# Patient Record
Sex: MALE | Race: WHITE | NOT HISPANIC OR LATINO | Employment: OTHER | URBAN - METROPOLITAN AREA
[De-identification: names, ages, dates, MRNs, and addresses within clinical notes are randomized per-mention and may not be internally consistent; named-entity substitution may affect disease eponyms.]

---

## 2017-02-21 ENCOUNTER — GENERIC CONVERSION - ENCOUNTER (OUTPATIENT)
Dept: OTHER | Facility: OTHER | Age: 57
End: 2017-02-21

## 2017-02-21 ENCOUNTER — LAB CONVERSION - ENCOUNTER (OUTPATIENT)
Dept: OTHER | Facility: OTHER | Age: 57
End: 2017-02-21

## 2017-02-21 LAB — TSH SERPL DL<=0.05 MIU/L-ACNC: 5.59 MIU/L (ref 0.4–4.5)

## 2017-04-07 ENCOUNTER — ALLSCRIPTS OFFICE VISIT (OUTPATIENT)
Dept: OTHER | Facility: OTHER | Age: 57
End: 2017-04-07

## 2017-04-18 ENCOUNTER — LAB CONVERSION - ENCOUNTER (OUTPATIENT)
Dept: OTHER | Facility: OTHER | Age: 57
End: 2017-04-18

## 2017-04-18 ENCOUNTER — GENERIC CONVERSION - ENCOUNTER (OUTPATIENT)
Dept: OTHER | Facility: OTHER | Age: 57
End: 2017-04-18

## 2017-04-18 LAB
BASOPHILS # BLD AUTO: 0.6 %
BASOPHILS # BLD AUTO: 56 CELLS/UL (ref 0–200)
DEPRECATED RDW RBC AUTO: 15.5 % (ref 11–15)
EOSINOPHIL # BLD AUTO: 470 CELLS/UL (ref 15–500)
EOSINOPHIL # BLD AUTO: 5 %
HCT VFR BLD AUTO: 44.6 % (ref 38.5–50)
HGB BLD-MCNC: 14.6 G/DL (ref 13.2–17.1)
LYMPHOCYTES # BLD AUTO: 3675 CELLS/UL (ref 850–3900)
LYMPHOCYTES # BLD AUTO: 39.1 %
MCH RBC QN AUTO: 32.5 PG (ref 27–33)
MCHC RBC AUTO-ENTMCNC: 32.8 G/DL (ref 32–36)
MCV RBC AUTO: 99.4 FL (ref 80–100)
MONOCYTES # BLD AUTO: 874 CELLS/UL (ref 200–950)
MONOCYTES (HISTORICAL): 9.3 %
NEUTROPHILS # BLD AUTO: 4324 CELLS/UL (ref 1500–7800)
NEUTROPHILS # BLD AUTO: 46 %
PLATELET # BLD AUTO: 265 THOUSAND/UL (ref 140–400)
PMV BLD AUTO: 9.2 FL (ref 7.5–12.5)
RBC # BLD AUTO: 4.48 MILLION/UL (ref 4.2–5.8)
T4 TOTAL (HISTORICAL): 6.8 MCG/DL (ref 4.5–12)
TSH SERPL DL<=0.05 MIU/L-ACNC: 6.54 MIU/L (ref 0.4–4.5)
WBC # BLD AUTO: 9.4 THOUSAND/UL (ref 3.8–10.8)

## 2017-05-09 DIAGNOSIS — Z79.899 OTHER LONG TERM (CURRENT) DRUG THERAPY: ICD-10-CM

## 2017-05-09 DIAGNOSIS — E03.9 HYPOTHYROIDISM: ICD-10-CM

## 2017-05-10 ENCOUNTER — LAB CONVERSION - ENCOUNTER (OUTPATIENT)
Dept: OTHER | Facility: OTHER | Age: 57
End: 2017-05-10

## 2017-05-10 ENCOUNTER — GENERIC CONVERSION - ENCOUNTER (OUTPATIENT)
Dept: OTHER | Facility: OTHER | Age: 57
End: 2017-05-10

## 2017-05-10 LAB
T4 TOTAL (HISTORICAL): 6.7 MCG/DL (ref 4.5–12)
TSH SERPL DL<=0.05 MIU/L-ACNC: 3.91 MIU/L (ref 0.4–4.5)

## 2017-06-05 ENCOUNTER — ALLSCRIPTS OFFICE VISIT (OUTPATIENT)
Dept: OTHER | Facility: OTHER | Age: 57
End: 2017-06-05

## 2017-06-06 ENCOUNTER — ALLSCRIPTS OFFICE VISIT (OUTPATIENT)
Dept: WOUND CARE | Facility: HOSPITAL | Age: 57
End: 2017-06-06
Payer: MEDICARE

## 2017-06-06 PROCEDURE — 99213 OFFICE O/P EST LOW 20 MIN: CPT | Performed by: PODIATRIST

## 2017-06-06 PROCEDURE — 29580 STRAPPING UNNA BOOT: CPT | Performed by: PODIATRIST

## 2017-06-13 ENCOUNTER — APPOINTMENT (OUTPATIENT)
Dept: WOUND CARE | Facility: HOSPITAL | Age: 57
End: 2017-06-13
Payer: MEDICARE

## 2017-06-13 PROCEDURE — 29580 STRAPPING UNNA BOOT: CPT | Performed by: PODIATRIST

## 2017-06-20 ENCOUNTER — APPOINTMENT (OUTPATIENT)
Dept: WOUND CARE | Facility: HOSPITAL | Age: 57
End: 2017-06-20
Payer: MEDICARE

## 2017-06-20 PROCEDURE — 99211 OFF/OP EST MAY X REQ PHY/QHP: CPT | Performed by: PODIATRIST

## 2017-08-02 ENCOUNTER — GENERIC CONVERSION - ENCOUNTER (OUTPATIENT)
Dept: OTHER | Facility: OTHER | Age: 57
End: 2017-08-02

## 2017-08-02 ENCOUNTER — APPOINTMENT (OUTPATIENT)
Dept: LAB | Facility: HOSPITAL | Age: 57
End: 2017-08-02
Payer: MEDICARE

## 2017-08-02 DIAGNOSIS — I10 ESSENTIAL (PRIMARY) HYPERTENSION: ICD-10-CM

## 2017-08-02 LAB
ALBUMIN SERPL BCP-MCNC: 3.5 G/DL (ref 3.5–5)
ALP SERPL-CCNC: 111 U/L (ref 46–116)
ALT SERPL W P-5'-P-CCNC: 104 U/L (ref 12–78)
ANION GAP SERPL CALCULATED.3IONS-SCNC: 9 MMOL/L (ref 4–13)
AST SERPL W P-5'-P-CCNC: 106 U/L (ref 5–45)
BASOPHILS # BLD AUTO: 0.05 THOUSANDS/ΜL (ref 0–0.1)
BASOPHILS NFR BLD AUTO: 1 % (ref 0–1)
BILIRUB SERPL-MCNC: 0.56 MG/DL (ref 0.2–1)
BUN SERPL-MCNC: 11 MG/DL (ref 5–25)
CALCIUM SERPL-MCNC: 8.9 MG/DL (ref 8.3–10.1)
CHLORIDE SERPL-SCNC: 101 MMOL/L (ref 100–108)
CHOLEST SERPL-MCNC: 157 MG/DL (ref 50–200)
CO2 SERPL-SCNC: 29 MMOL/L (ref 21–32)
CREAT SERPL-MCNC: 1.14 MG/DL (ref 0.6–1.3)
EOSINOPHIL # BLD AUTO: 0.33 THOUSAND/ΜL (ref 0–0.61)
EOSINOPHIL NFR BLD AUTO: 4 % (ref 0–6)
ERYTHROCYTE [DISTWIDTH] IN BLOOD BY AUTOMATED COUNT: 14.4 % (ref 11.6–15.1)
GFR SERPL CREATININE-BSD FRML MDRD: 70 ML/MIN/1.73SQ M
GLUCOSE P FAST SERPL-MCNC: 100 MG/DL (ref 65–99)
HCT VFR BLD AUTO: 42.6 % (ref 36.5–49.3)
HDLC SERPL-MCNC: 51 MG/DL (ref 40–60)
HGB BLD-MCNC: 15 G/DL (ref 12–17)
LDLC SERPL CALC-MCNC: 56 MG/DL (ref 0–100)
LYMPHOCYTES # BLD AUTO: 3.98 THOUSANDS/ΜL (ref 0.6–4.47)
LYMPHOCYTES NFR BLD AUTO: 43 % (ref 14–44)
MCH RBC QN AUTO: 33.9 PG (ref 26.8–34.3)
MCHC RBC AUTO-ENTMCNC: 35.2 G/DL (ref 31.4–37.4)
MCV RBC AUTO: 96 FL (ref 82–98)
MONOCYTES # BLD AUTO: 1.03 THOUSAND/ΜL (ref 0.17–1.22)
MONOCYTES NFR BLD AUTO: 12 % (ref 4–12)
NEUTROPHILS # BLD AUTO: 3.6 THOUSANDS/ΜL (ref 1.85–7.62)
NEUTS SEG NFR BLD AUTO: 40 % (ref 43–75)
NRBC BLD AUTO-RTO: 0 /100 WBCS
PLATELET # BLD AUTO: 256 THOUSANDS/UL (ref 149–390)
PMV BLD AUTO: 10.7 FL (ref 8.9–12.7)
POTASSIUM SERPL-SCNC: 3.8 MMOL/L (ref 3.5–5.3)
PROT SERPL-MCNC: 8 G/DL (ref 6.4–8.2)
RBC # BLD AUTO: 4.43 MILLION/UL (ref 3.88–5.62)
SODIUM SERPL-SCNC: 139 MMOL/L (ref 136–145)
TRIGL SERPL-MCNC: 251 MG/DL
TSH SERPL DL<=0.05 MIU/L-ACNC: 4.36 UIU/ML (ref 0.36–3.74)
WBC # BLD AUTO: 8.99 THOUSAND/UL (ref 4.31–10.16)

## 2017-08-02 PROCEDURE — 84443 ASSAY THYROID STIM HORMONE: CPT

## 2017-08-02 PROCEDURE — 80053 COMPREHEN METABOLIC PANEL: CPT

## 2017-08-02 PROCEDURE — 85025 COMPLETE CBC W/AUTO DIFF WBC: CPT

## 2017-08-02 PROCEDURE — 80061 LIPID PANEL: CPT

## 2017-08-02 PROCEDURE — 36415 COLL VENOUS BLD VENIPUNCTURE: CPT

## 2017-08-21 DIAGNOSIS — I10 ESSENTIAL (PRIMARY) HYPERTENSION: ICD-10-CM

## 2017-08-21 DIAGNOSIS — R74.8 ABNORMAL LEVELS OF OTHER SERUM ENZYMES: ICD-10-CM

## 2017-08-30 ENCOUNTER — ALLSCRIPTS OFFICE VISIT (OUTPATIENT)
Dept: OTHER | Facility: OTHER | Age: 57
End: 2017-08-30

## 2017-09-05 ENCOUNTER — HOSPITAL ENCOUNTER (OUTPATIENT)
Dept: ULTRASOUND IMAGING | Facility: HOSPITAL | Age: 57
Discharge: HOME | End: 2017-09-05
Attending: INTERNAL MEDICINE
Payer: MEDICARE

## 2017-09-05 ENCOUNTER — APPOINTMENT (OUTPATIENT)
Dept: LAB | Facility: HOSPITAL | Age: 57
End: 2017-09-05
Payer: MEDICARE

## 2017-09-05 ENCOUNTER — ALLSCRIPTS OFFICE VISIT (OUTPATIENT)
Dept: OTHER | Facility: OTHER | Age: 57
End: 2017-09-05

## 2017-09-05 DIAGNOSIS — R74.8 ABNORMAL LEVELS OF OTHER SERUM ENZYMES: ICD-10-CM

## 2017-09-05 DIAGNOSIS — Z79.899 OTHER LONG TERM (CURRENT) DRUG THERAPY: ICD-10-CM

## 2017-09-05 DIAGNOSIS — E03.9 HYPOTHYROIDISM: ICD-10-CM

## 2017-09-05 LAB
FERRITIN SERPL-MCNC: 173 NG/ML (ref 8–388)
INR PPP: 1.04 (ref 0.86–1.16)
IRON SATN MFR SERPL: 31 %
IRON SERPL-MCNC: 130 UG/DL (ref 65–175)
PROTHROMBIN TIME: 13.6 SECONDS (ref 12.1–14.4)
T4 SERPL-MCNC: 7.6 UG/DL (ref 4.7–13.3)
TIBC SERPL-MCNC: 417 UG/DL (ref 250–450)
TSH SERPL DL<=0.05 MIU/L-ACNC: 2.41 UIU/ML (ref 0.36–3.74)

## 2017-09-05 PROCEDURE — 83550 IRON BINDING TEST: CPT

## 2017-09-05 PROCEDURE — 86038 ANTINUCLEAR ANTIBODIES: CPT

## 2017-09-05 PROCEDURE — 87340 HEPATITIS B SURFACE AG IA: CPT

## 2017-09-05 PROCEDURE — 83540 ASSAY OF IRON: CPT

## 2017-09-05 PROCEDURE — 85610 PROTHROMBIN TIME: CPT

## 2017-09-05 PROCEDURE — 82728 ASSAY OF FERRITIN: CPT

## 2017-09-05 PROCEDURE — 86256 FLUORESCENT ANTIBODY TITER: CPT

## 2017-09-05 PROCEDURE — 76705 ECHO EXAM OF ABDOMEN: CPT

## 2017-09-05 PROCEDURE — 87522 HEPATITIS C REVRS TRNSCRPJ: CPT

## 2017-09-05 PROCEDURE — 86706 HEP B SURFACE ANTIBODY: CPT

## 2017-09-05 PROCEDURE — 36415 COLL VENOUS BLD VENIPUNCTURE: CPT

## 2017-09-05 PROCEDURE — 86235 NUCLEAR ANTIGEN ANTIBODY: CPT

## 2017-09-05 PROCEDURE — 86803 HEPATITIS C AB TEST: CPT

## 2017-09-05 PROCEDURE — 86708 HEPATITIS A ANTIBODY: CPT

## 2017-09-05 PROCEDURE — 84436 ASSAY OF TOTAL THYROXINE: CPT

## 2017-09-05 PROCEDURE — 84443 ASSAY THYROID STIM HORMONE: CPT

## 2017-09-06 LAB
ACTIN IGG SERPL-ACNC: 13 UNITS (ref 0–19)
HAV AB SER QL IA: NORMAL
HBV SURFACE AB SER-ACNC: <3.1 MIU/ML
HBV SURFACE AG SER QL: NORMAL
HCV AB SER QL: NORMAL
MITOCHONDRIA M2 IGG SER-ACNC: 4.1 UNITS (ref 0–20)
RYE IGE QN: NEGATIVE

## 2017-09-07 LAB
HCV RNA SERPL NAA+PROBE-ACNC: NORMAL IU/ML
TEST INFORMATION: NORMAL

## 2017-09-08 ENCOUNTER — GENERIC CONVERSION - ENCOUNTER (OUTPATIENT)
Dept: OTHER | Facility: OTHER | Age: 57
End: 2017-09-08

## 2017-09-25 ENCOUNTER — GENERIC CONVERSION - ENCOUNTER (OUTPATIENT)
Dept: OTHER | Facility: OTHER | Age: 57
End: 2017-09-25

## 2017-09-25 DIAGNOSIS — R74.8 ABNORMAL LEVELS OF OTHER SERUM ENZYMES: ICD-10-CM

## 2017-09-27 ENCOUNTER — APPOINTMENT (OUTPATIENT)
Dept: LAB | Facility: HOSPITAL | Age: 57
End: 2017-09-27
Attending: INTERNAL MEDICINE
Payer: MEDICARE

## 2017-09-27 DIAGNOSIS — R74.8 ABNORMAL LEVELS OF OTHER SERUM ENZYMES: ICD-10-CM

## 2017-09-27 DIAGNOSIS — Z79.899 OTHER LONG TERM (CURRENT) DRUG THERAPY: ICD-10-CM

## 2017-09-27 DIAGNOSIS — E03.9 HYPOTHYROIDISM: ICD-10-CM

## 2017-09-27 LAB
ALBUMIN SERPL BCP-MCNC: 3.6 G/DL (ref 3.5–5)
ALP SERPL-CCNC: 94 U/L (ref 46–116)
ALT SERPL W P-5'-P-CCNC: 87 U/L (ref 12–78)
ANION GAP SERPL CALCULATED.3IONS-SCNC: 8 MMOL/L (ref 4–13)
AST SERPL W P-5'-P-CCNC: 81 U/L (ref 5–45)
BILIRUB DIRECT SERPL-MCNC: 0.18 MG/DL (ref 0–0.2)
BILIRUB SERPL-MCNC: 0.39 MG/DL (ref 0.2–1)
BUN SERPL-MCNC: 14 MG/DL (ref 5–25)
CALCIUM SERPL-MCNC: 9.2 MG/DL (ref 8.3–10.1)
CHLORIDE SERPL-SCNC: 103 MMOL/L (ref 100–108)
CO2 SERPL-SCNC: 29 MMOL/L (ref 21–32)
CREAT SERPL-MCNC: 1.09 MG/DL (ref 0.6–1.3)
GFR SERPL CREATININE-BSD FRML MDRD: 74 ML/MIN/1.73SQ M
GLUCOSE P FAST SERPL-MCNC: 91 MG/DL (ref 65–99)
POTASSIUM SERPL-SCNC: 4.4 MMOL/L (ref 3.5–5.3)
PROT SERPL-MCNC: 7.8 G/DL (ref 6.4–8.2)
SODIUM SERPL-SCNC: 140 MMOL/L (ref 136–145)

## 2017-09-27 PROCEDURE — 36415 COLL VENOUS BLD VENIPUNCTURE: CPT

## 2017-09-27 PROCEDURE — 80076 HEPATIC FUNCTION PANEL: CPT

## 2017-09-27 PROCEDURE — 80048 BASIC METABOLIC PNL TOTAL CA: CPT

## 2017-11-28 ENCOUNTER — APPOINTMENT (OUTPATIENT)
Dept: LAB | Facility: HOSPITAL | Age: 57
End: 2017-11-28
Attending: INTERNAL MEDICINE
Payer: MEDICARE

## 2017-11-28 ENCOUNTER — TRANSCRIBE ORDERS (OUTPATIENT)
Dept: ADMINISTRATIVE | Facility: HOSPITAL | Age: 57
End: 2017-11-28

## 2017-11-28 ENCOUNTER — GENERIC CONVERSION - ENCOUNTER (OUTPATIENT)
Dept: OTHER | Facility: OTHER | Age: 57
End: 2017-11-28

## 2017-11-28 ENCOUNTER — ALLSCRIPTS OFFICE VISIT (OUTPATIENT)
Dept: OTHER | Facility: OTHER | Age: 57
End: 2017-11-28

## 2017-11-28 DIAGNOSIS — R74.8 ABNORMAL LEVELS OF OTHER SERUM ENZYMES: ICD-10-CM

## 2017-11-28 DIAGNOSIS — R74.8 ACID PHOSPHATASE ELEVATED: ICD-10-CM

## 2017-11-28 DIAGNOSIS — R74.8 ACID PHOSPHATASE ELEVATED: Primary | ICD-10-CM

## 2017-11-28 LAB
ALBUMIN SERPL BCP-MCNC: 3.7 G/DL (ref 3.5–5)
ALP SERPL-CCNC: 98 U/L (ref 46–116)
ALT SERPL W P-5'-P-CCNC: 67 U/L (ref 12–78)
ANION GAP SERPL CALCULATED.3IONS-SCNC: 9 MMOL/L (ref 4–13)
AST SERPL W P-5'-P-CCNC: 74 U/L (ref 5–45)
BASOPHILS # BLD AUTO: 0.04 THOUSANDS/ΜL (ref 0–0.1)
BASOPHILS NFR BLD AUTO: 0 % (ref 0–1)
BILIRUB DIRECT SERPL-MCNC: 0.17 MG/DL (ref 0–0.2)
BILIRUB SERPL-MCNC: 0.39 MG/DL (ref 0.2–1)
BUN SERPL-MCNC: 13 MG/DL (ref 5–25)
CALCIUM SERPL-MCNC: 9 MG/DL (ref 8.3–10.1)
CHLORIDE SERPL-SCNC: 102 MMOL/L (ref 100–108)
CO2 SERPL-SCNC: 27 MMOL/L (ref 21–32)
CREAT SERPL-MCNC: 1.08 MG/DL (ref 0.6–1.3)
EOSINOPHIL # BLD AUTO: 0.17 THOUSAND/ΜL (ref 0–0.61)
EOSINOPHIL NFR BLD AUTO: 2 % (ref 0–6)
ERYTHROCYTE [DISTWIDTH] IN BLOOD BY AUTOMATED COUNT: 14.5 % (ref 11.6–15.1)
GFR SERPL CREATININE-BSD FRML MDRD: 75 ML/MIN/1.73SQ M
GGT SERPL-CCNC: 419 U/L (ref 5–85)
GLUCOSE P FAST SERPL-MCNC: 103 MG/DL (ref 65–99)
HCT VFR BLD AUTO: 43.9 % (ref 36.5–49.3)
HGB BLD-MCNC: 14.9 G/DL (ref 12–17)
INR PPP: 1.07 (ref 0.86–1.16)
LYMPHOCYTES # BLD AUTO: 2.46 THOUSANDS/ΜL (ref 0.6–4.47)
LYMPHOCYTES NFR BLD AUTO: 25 % (ref 14–44)
MCH RBC QN AUTO: 33.1 PG (ref 26.8–34.3)
MCHC RBC AUTO-ENTMCNC: 33.9 G/DL (ref 31.4–37.4)
MCV RBC AUTO: 98 FL (ref 82–98)
MONOCYTES # BLD AUTO: 1.06 THOUSAND/ΜL (ref 0.17–1.22)
MONOCYTES NFR BLD AUTO: 11 % (ref 4–12)
NEUTROPHILS # BLD AUTO: 5.97 THOUSANDS/ΜL (ref 1.85–7.62)
NEUTS SEG NFR BLD AUTO: 62 % (ref 43–75)
PLATELET # BLD AUTO: 312 THOUSANDS/UL (ref 149–390)
PMV BLD AUTO: 10.1 FL (ref 8.9–12.7)
POTASSIUM SERPL-SCNC: 4.2 MMOL/L (ref 3.5–5.3)
PROT SERPL-MCNC: 8.3 G/DL (ref 6.4–8.2)
PROTHROMBIN TIME: 13.9 SECONDS (ref 12.1–14.4)
RBC # BLD AUTO: 4.5 MILLION/UL (ref 3.88–5.62)
SODIUM SERPL-SCNC: 138 MMOL/L (ref 136–145)
WBC # BLD AUTO: 9.7 THOUSAND/UL (ref 4.31–10.16)

## 2017-11-28 PROCEDURE — 80076 HEPATIC FUNCTION PANEL: CPT

## 2017-11-28 PROCEDURE — 82977 ASSAY OF GGT: CPT

## 2017-11-28 PROCEDURE — 87902 NFCT AGT GNTYP ALYS HEP C: CPT

## 2017-11-28 PROCEDURE — 80048 BASIC METABOLIC PNL TOTAL CA: CPT

## 2017-11-28 PROCEDURE — 85025 COMPLETE CBC W/AUTO DIFF WBC: CPT

## 2017-11-28 PROCEDURE — 36415 COLL VENOUS BLD VENIPUNCTURE: CPT

## 2017-11-28 PROCEDURE — 85610 PROTHROMBIN TIME: CPT

## 2017-11-29 NOTE — PROGRESS NOTES
Assessment  1  Elevated liver enzymes (790 5) (R74 8)    Plan  Elevated liver enzymes    · (1) BASIC METABOLIC PROFILE; Status:Active; Requested for:2017;    Perform:[facility]; XBW:79FSD2690; Ordered;For:Elevated liver enzymes; Ordered By:José Ngo;   · (1) CBC/PLT/DIFF; Status:Active; Requested for:2017;    Perform:[facility]; XJE:39PSB6802; Ordered;For:Elevated liver enzymes; Ordered By:José Ngo;   · (1) GGT; Status:Active; Requested for:2017;    Perform:[facility]; IYD:41KGN1686; Ordered;For:Elevated liver enzymes; Ordered By:José Ngo;   · (1) HEPATIC FUNCTION PANEL; Status:Active; Requested for:2017;    Perform:[facility]; BK56BHT3606; Ordered;For:Elevated liver enzymes; Ordered By:José Ngo;   · (1) PT WITH INR; Status:Active; Requested for:2017;    Perform:[facility]; TMA:70BOR8257; Ordered;liver enzymes; Ordered By:Lin, Madonna Nageotte;   · Follow-up visit in 3 months Evaluation and Treatment  Follow-up  Status: Hold For -Scheduling  Requested for: 70MLT2346   Ordered;Elevated liver enzymes; Ordered By: Raymon Narvaez Performed:  Due: 66YBF8557    Discussion/Summary  Discussion Summary:   Abnormal LFTs:Likely secondary to alcohol abuse  Patient reported he has cut down his alcohol use significantly  Repeat LFTs  If it remains to be high, I will order a liver biopsy for him  If bilirubin or alkaline phosphatase trends up, we will consider MRCP  Follow-up in 3 months  Counseling Documentation With Imm: The patient was counseled regarding diagnostic results,-- instructions for management,-- risk factor reductions,-- impressions  Chief Complaint  Chief Complaint Free Text Note Form: Pt follow up for elevated liver enzymes  History of Present Illness  HPI: 68-year-old man presented for follow-up on abnormal liver function test  Patient reported he is symptom free  He denies jaundice or increased abdominal girth   he reported having cutting down his alcohol use  He reported he used to drink 5 cases of beers every week  Now he drinks 3 cases of beers shared by him and other 2 roommates  His abdominal ultrasound showed fatty liver and slightly enlarged CBD at 7 mm  his previous lab showed normal alkaline phosphatase and bilirubin  Review of Systems  Complete-Male GI Adult:  Constitutional: No fever or chills, feels well, no tiredness, no recent weight gain or weight loss  Eyes: No complaints of eye pain, no red eyes, no discharge from eyes, no itchy eyes  ENT: no complaints of earache, no hearing loss, no nosebleeds, no nasal discharge, no sore throat, no hoarseness  Cardiovascular: No complaints of slow heart rate, no fast heart rate, no chest pain, no palpitations, no leg claudication, no lower extremity  Respiratory: No complaints of shortness of breath, no wheezing, no cough, no SOB on exertion, no orthopnea or PND  Gastrointestinal: as noted in HPI  Genitourinary: No complaints of dysuria, no incontinence, no hesitancy, no nocturia, no genital lesion, no testicular pain  Musculoskeletal: No complaints of arthralgia, no myalgias, no joint swelling or stiffness, no limb pain or swelling  Integumentary: No complaints of skin rash or skin lesions, no itching, no skin wound, no dry skin  Neurological: No compliants of headache, no confusion, no convulsions, no numbness or tingling, no dizziness or fainting, no limb weakness, no difficulty walking  Psychiatric: Is not suicidal, no sleep disturbances, no anxiety or depression, no change in personality, no emotional problems  Endocrine: No complaints of proptosis, no hot flashes, no muscle weakness, no erectile dysfunction, no deepening of the voice, no feelings of weakness  Hematologic/Lymphatic: No complaints of swollen glands, no swollen glands in the neck, does not bleed easily, no easy bruising  ROS Reviewed:   ROS reviewed  Active Problems  1   Bilateral leg edema (782  3) (R60 0)   2  Colon polyps (211 3) (K63 5)   3  Elevated liver enzymes (790 5) (R74 8)   4  GERD without esophagitis (530 81) (K21 9)   5  Hyperlipidemia (272 4) (E78 5)   6  Hypertension (401 9) (I10)   7  Hypothyroidism (244 9) (E03 9)   8  Leg varicosity w ulcer, left (454 0) (I83 029)   9  Medication dose changed (V58 69) (Z79 899)   10  Need for vaccination (V05 9) (Z23)   11  Traumatic brain injury (854 00) (O53 0A4L)    Past Medical History    1  History of Alcohol dependence (303 90) (F10 20)   2  History of Cellulitis of left leg (682 6) (L03 116)   3  History of Chronic embolism and thrombosis of deep vein of both distal lower extremities (453 52) (I82 5Z3)   4  History of Flu vaccine need (V04 81) (Z23)   5  History of High risk medication use (V58 69) (Z79 899)   6  History of contact dermatitis (V13 3) (Z87 2)   7  History of leukocytosis (V12 3) (Z86 2)   8  History of Need for Tdap vaccination (V06 1) (Z23)   9  History of Nonhealing ulcer of left lower leg (707 19) (L97 929)   10  History of Screening for prostate cancer (V76 44) (Z12 5)   11  History of Weight gain (783 1) (R63 5)  Active Problems And Past Medical History Reviewed: The active problems and past medical history were reviewed and updated today  Surgical History  1  History of [facility] Cerebral   2  History of Complete Colonoscopy   3  History of Splenectomy    Family History  Mother    1  Family history of dementia (V17 2) (Z81 8)   2  Family history of hypertension (V17 49) (Z82 49)   3  Family history of myocardial infarction (V17 3) (Z82 49)  Father    4  Family history of hypertension (V17 49) (Z82 49)   5  Family history of malignant neoplasm (V16 9) (Z80 9)  Family History    6  Family history of hypertension (V17 49) (Z82 49)   7  Family history of malignant neoplasm (V16 9) (Z80 9)    Social History     · Alcohol use (V49 89) (Z78 9)   · Former smoker (I88 00) (B79 139)    Current Meds   1   AmLODIPine Besylate 5 MG Oral Tablet; Take 1 tablet daily  Requested for: 61DMN7050; Last Rx:12Oct2017 Ordered   2  Atorvastatin Calcium 40 MG Oral Tablet; Take 1 tablet daily  Requested for: 12Jun2017; Last Rx:12Jun2017 Ordered   3  Famotidine 20 MG Oral Tablet; TAKE 1 TABLET TWICE DAILY  Requested for: 95XTE5253; Last GA:12MBD7337 Ordered   4  Furosemide 20 MG Oral Tablet; TAKE 1 TABLET DAILY; Therapy: 35IIE9671 to (Evaluate:07Sze6878)  Requested for: 24WJZ7146; Last Rx:84Onq3244 Ordered   5  Levothyroxine Sodium 50 MCG Oral Tablet; take 1 tablet every day; Therapy: 14Lyf5517 to (Evaluate:22Ovm1727)  Requested for: 44LNC6487; Last Rx:37Oiq8307 Ordered   6  Potassium Chloride ER 10 MEQ Oral Capsule Extended Release; take 1 capsule daily; Therapy: 98KWI6977 to (Last Rx:70Sac1472)  Requested for: 64Kwd6937 Ordered    Allergies  1  Penicillins    Vitals  Vital Signs    Recorded: 47LBO3989 09:00AM   Temperature 97 F, Tympanic   Heart Rate 94   Systolic 997, RUE, Sitting   Diastolic 720, RUE, Sitting   BP CUFF SIZE Large   Height 6 ft 2 in   Weight 185 lb    BMI Calculated 23 75   BSA Calculated 2 1   O2 Saturation 98, RA       Physical Exam   Constitutional  General appearance: No acute distress, well appearing and well nourished  Eyes  Conjunctiva and lids: No swelling, erythema, or discharge  Ears, Nose, Mouth, and Throat  Oropharynx: Normal with no erythema, edema, exudate or lesions  Pulmonary  Respiratory effort: No increased work of breathing or signs of respiratory distress  Cardiovascular  Examination of extremities for edema and/or varicosities: Normal    Abdomen  Abdomen: Non-tender, no masses  Musculoskeletal  Gait and station: Normal    Psychiatric  Orientation to person, place and time: Normal          Health Management  Colon polyps   COLONOSCOPY (GI, SURG); every 3 years; Last 82Yey0961; Next Due: 30Uad1653;  Active    Signatures   Electronically signed by : Angela Fowler MD; Nov 28 2017 11:33AM EST (Author)

## 2017-12-05 LAB — MISCELLANEOUS LAB TEST RESULT: NORMAL

## 2017-12-18 ENCOUNTER — APPOINTMENT (OUTPATIENT)
Dept: LAB | Facility: HOSPITAL | Age: 57
End: 2017-12-18
Attending: INTERNAL MEDICINE
Payer: MEDICARE

## 2017-12-18 DIAGNOSIS — R74.8 ABNORMAL LEVELS OF OTHER SERUM ENZYMES: ICD-10-CM

## 2017-12-18 LAB
ALBUMIN SERPL BCP-MCNC: 3.4 G/DL (ref 3.5–5)
ALP SERPL-CCNC: 114 U/L (ref 46–116)
ALT SERPL W P-5'-P-CCNC: 70 U/L (ref 12–78)
AST SERPL W P-5'-P-CCNC: 74 U/L (ref 5–45)
BILIRUB DIRECT SERPL-MCNC: 0.31 MG/DL (ref 0–0.2)
BILIRUB SERPL-MCNC: 0.97 MG/DL (ref 0.2–1)
PROT SERPL-MCNC: 7.7 G/DL (ref 6.4–8.2)

## 2017-12-18 PROCEDURE — 80076 HEPATIC FUNCTION PANEL: CPT

## 2017-12-18 PROCEDURE — 36415 COLL VENOUS BLD VENIPUNCTURE: CPT

## 2017-12-21 ENCOUNTER — GENERIC CONVERSION - ENCOUNTER (OUTPATIENT)
Dept: OTHER | Facility: OTHER | Age: 57
End: 2017-12-21

## 2018-01-03 ENCOUNTER — ALLSCRIPTS OFFICE VISIT (OUTPATIENT)
Dept: OTHER | Facility: OTHER | Age: 58
End: 2018-01-03

## 2018-01-04 NOTE — PROGRESS NOTES
Assessment   1  Hypertension (401 9) (I10)   2  Hypothyroidism (244 9) (E03 9)   3  Hyperlipidemia (272 4) (E78 5)   4  Elevated liver enzymes (790 5) (R74 8)   5  Traumatic brain injury (854 00) (S06 9X9A)    Plan   Elevated liver enzymes, Hyperlipidemia, Hypertension, Hypothyroidism    · Continue with our present treatment plan ; Status:Complete;   Done: 28XGR3005  Hyperlipidemia    · Call [phone] if: You have muscle cramps ; Status:Complete;   Done: 92CUI4214   · Call [phone] if: You have pain in the stomach area ; Status:Complete;   Done:    63KCF1881   · Call [phone] if: You start vomiting ; Status:Complete;   Done: 14LGQ7068   · Eat a low fat and low cholesterol diet ; Status:Complete;   Done: 44ZDQ5260  Hyperlipidemia, Hypertension    · Begin or continue regular aerobic exercise  Gradually work up to at least 3 sessions of    30 minutes of exercise a week ; Status:Complete;   Done: 18IYI4776   · Eat a low fat and low cholesterol diet ; Status:Complete;   Done: 50MDX5130   · Call 911 if: You have any symptoms of a stroke ; Status:Complete;   Done: 36KGY0320  Hyperlipidemia, Hypertension, Hypothyroidism    · Call 911 if: You experience a new kind of chest pain (angina) or pressure ;    Status:Complete;   Done: 77XWE2329  Hypertension    · (1) CBC/PLT/DIFF; Status:Active; Requested for:66Kyb5942;    · (1) COMPREHENSIVE METABOLIC PANEL; Status:Active; Requested for:39Tph9281;    · (1) LIPID PANEL, FASTING; Status:Active; Requested for:36Chx7866;    · (1) TSH; Status:Active; Requested for:76Zdz4467;    · *VB-Depression Screening; Status:Resulted - Requires Verification;   Done: 38JVT1094    08:47AM   · Restrict the salt in your diet by avoiding highly salted foods ; Status:Complete;   Done:    96JHX3369   · Call [phone] if: You become dizzy or lightheaded, especially when you stand up    after sitting for a while ; Status:Complete;   Done: 97TZW0573   · Call [phone] if:  You develop double vision (see two of everything) ;    Status:Complete;   Done: 03MPP5824   · Call [phone] if: Your blood pressure is frequently higher than 140/90 ;    Status:Complete;   Done: 59AMM1364   · Seek Immediate Medical Attention if: You have a severe headache that will not go away ;    Status:Complete;   Done: 52EQR6043   · Seek Immediate Medical Attention if: Your blood pressure is greater than 250/120 for 2    consecutive readings ; Status:Complete;   Done: 95YBN7581  Hypothyroidism    · Call [phone] if: The symptoms are not better in 7 days ; Status:Complete;   Done:    70OUW4544   · Call [phone] if: You begin to have tremors or your hands become shaky ;    Status:Complete;   Done: 01QFL1110   · Call [phone] if: You gain or lose over 10 pounds without a change in your diet ;    Status:Complete;   Done: 99FTZ5293   · Call [phone] if: You have symptoms of anxiety ; Status:Complete;   Done:    15VXO9110   · Call [phone] if: You lose weight without trying to ; Status:Complete;   Done:    23DMA4884   · Call [phone] if: Your loss of memory seems to be worse ; Status:Complete;      Done: 23AUJ7778   · Call 911 if: You are having trouble staying awake ; Status:Complete;   Done: 44AQU3308   · Call 911 if:  You experience a seizure ; Status:Complete;   Done: 90VQR6316   · Call 911 if: You have fainted or passed out ; Status:Complete;   Done: 95NAB3240   · Seek Immediate Medical Attention if: You are feeling short of breath ; Status:Complete;      Done: 94ZGS0895   · Seek Immediate Medical Attention if: You feel your heart is beating very fast or skipping    beats ; Status:Complete;   Done: 60GCN8624   · Seek Immediate Medical Attention if: You have signs of too much thyroid hormone ;    Status:Complete;   Done: 54KDS1605  PMH: Bilateral leg edema    · Furosemide 20 MG Oral Tablet (Lasix); TAKE 1 TABLET DAILY    Discussion/Summary      Patient will followup with GI as bashir We again discussed the need to abstain from alcohol Patient has been down to to 2-3 beers per week Patient will see GI as scheduled in 2/2018 Patient will have repeat labs as bashir  Possible side effects of new medications were reviewed with the patient/guardian today  The treatment plan was reviewed with the patient/guardian  The patient/guardian understands and agrees with the treatment plan      Chief Complaint   HERE TODAY FOR FOLLOW UP HTN,LIPIDS AND HYPOTHYROID  History of Present Illness   Patient is here for followup of hypertension, hyperlipidemia, elevated liver function tests    The patient is being seen for follow-up of Hashimoto's thyroiditis  His hyperlipidemia has been stable  His LDL goal is <100 mg/dL-- and-- last LDL was 56 mg/dL  the patient is adherent with his medication regimen  -- He denies medication side effects  His hypertension is primary, but-- stable  the patient is adherent with his medication regimen  -- He denies medication side effects  Symptoms: The patient denies any symptoms currently  no vomiting Associated symptoms include no orthopnea,-- no polyuria,-- no focal neurologic deficits,-- no palpitations,-- no syncope,-- no headache,-- no PND,-- no memory loss,-- no polydipsia-- and-- no heartburn  Lifestyle and Disease Management: Diet: He consumes a diverse and healthy diet  Weight Issues: He has weight concerns  Exercise: He does not exercise regularly  Smoking: He does not use tobacco  Alcohol: He consumes alcohol -- 2-3 beer daily  Drug Use: He denies drug use  Goals: the patient is doing well with his goals  Review of Systems        Constitutional: No fever or chills, feels well, no tiredness, no recent weight gain or weight loss  Eyes: no eye pain-- and-- no eyesight problems  ENT: no complaints of earache, no hearing loss, no nosebleeds, no nasal discharge, no sore throat, no hoarseness  Cardiovascular: as noted in HPI  Respiratory: as noted in HPI  Gastrointestinal: No complaints of abdominal pain, no constipation, no nausea or vomiting, no diarrhea or bloody stools  Genitourinary: no dysuria-- and-- no incontinence  Musculoskeletal: no arthralgias-- and-- no myalgias  Integumentary: no rashes  Neurological: No compliants of headache, no confusion, no convulsions, no numbness or tingling, no dizziness or fainting, no limb weakness, no difficulty walking  Psychiatric: no anxiety,-- no sleep disturbances-- and-- no depression  Active Problems   1  Colon polyps (211 3) (K63 5)   2  Elevated liver enzymes (790 5) (R74 8)   3  GERD without esophagitis (530 81) (K21 9)   4  Hyperlipidemia (272 4) (E78 5)   5  Hypertension (401 9) (I10)   6  Hypothyroidism (244 9) (E03 9)   7  Medication dose changed (V58 69) (Z79 899)   8  Need for vaccination (V05 9) (Z23)   9  Traumatic brain injury (854 00) (C02 7S8A)    Past Medical History   1  History of Alcohol dependence (303 90) (F10 20)   2  History of Cellulitis of left leg (682 6) (L03 116)   3  History of Chronic embolism and thrombosis of deep vein of both distal lower     extremities (453 52) (I82 5Z3)   4  History of Flu vaccine need (V04 81) (Z23)   5  History of High risk medication use (V58 69) (Z79 899)   6  History of contact dermatitis (V13 3) (Z87 2)   7  History of leukocytosis (V12 3) (Z86 2)   8  History of Need for Tdap vaccination (V06 1) (Z23)   9  History of Nonhealing ulcer of left lower leg (707 19) (L97 929)   10  History of Screening for prostate cancer (V76 44) (Z12 5)   11  History of Weight gain (783 1) (R63 5)     The active problems and past medical history were reviewed and updated today  Surgical History   1  History of [facility] Cerebral   2  History of Complete Colonoscopy   3  History of Splenectomy     The surgical history was reviewed and updated today  Family History   Mother    1   Family history of dementia (V17 2) (Z81 8)   2  Family history of hypertension (V17 49) (Z82 49)   3  Family history of myocardial infarction (V17 3) (Z82 49)  Father    4  Family history of hypertension (V17 49) (Z82 49)   5  Family history of malignant neoplasm (V16 9) (Z80 9)  Family History    6  Family history of hypertension (V17 49) (Z82 49)   7  Family history of malignant neoplasm (V16 9) (Z80 9)     The family history was reviewed and updated today  Social History    · Alcohol use (V49 89) (Z78 9)   · Former smoker (R62 60) (A16 151)  The social history was reviewed and is unchanged  Current Meds    1  AmLODIPine Besylate 5 MG Oral Tablet; Take 1 tablet daily  Requested for: 36IPR1431;     Last Rx:93Bgp5517 Ordered   2  Atorvastatin Calcium 40 MG Oral Tablet; Take 1 tablet daily  Requested for: 12Jun2017;     Last Rx:12Jun2017 Ordered   3  Famotidine 20 MG Oral Tablet; TAKE 1 TABLET TWICE DAILY  Requested for: 82HES0045;     Last QA:37OMH9738 Ordered   4  Furosemide 20 MG Oral Tablet; TAKE 1 TABLET DAILY; Therapy: 44MBD6651 to (Evaluate:56Nog7115)  Requested for: 15AHT5563; Last     Rx:48Ojw6202 Ordered   5  Levothyroxine Sodium 50 MCG Oral Tablet; take 1 tablet every day; Therapy: 39Prl6226 to (Evaluate:53Suq4281)  Requested for: 23BMH1826; Last     Rx:54Uhd6720 Ordered   6  Potassium Chloride ER 10 MEQ Oral Capsule Extended Release; take 1 capsule daily; Therapy: 94BEH6166 to (Last Rx:65Tyt1980)  Requested for: 51Wix2596 Ordered     The medication list was reviewed and updated today  Allergies   1  Penicillins    Vitals   Vital Signs    Recorded: 22WIH3433 08:31AM   Temperature 85 5 F   Systolic 529   Diastolic 66   Height 6 ft 2 in   Weight 193 lb    BMI Calculated 24 78   BSA Calculated 2 14     Physical Exam        Constitutional      General appearance: No acute distress, well appearing and well nourished  Eyes      Conjunctiva and lids: No swelling, erythema, or discharge         Pupils and irises: Equal, round and reactive to light  Ears, Nose, Mouth, and Throat      External inspection of ears and nose: Normal        Otoscopic examination: Tympanic membrance translucent with normal light reflex  Canals patent without erythema  Nasal mucosa, septum, and turbinates: Normal without edema or erythema  Oropharynx: Normal with no erythema, edema, exudate or lesions  Pulmonary      Respiratory effort: No increased work of breathing or signs of respiratory distress  Auscultation of lungs: Clear to auscultation, equal breath sounds bilaterally, no wheezes, no rales, no rhonci  Cardiovascular      Auscultation of heart: Normal rate and rhythm, normal S1 and S2, without murmurs  Examination of extremities for edema and/or varicosities: Normal        Carotid pulses: Normal        Abdomen      Abdomen: Non-tender, no masses  Liver and spleen: No hepatomegaly or splenomegaly  Lymphatic      Palpation of lymph nodes in neck: No lymphadenopathy  Musculoskeletal      Gait and station: Normal        Skin      Skin and subcutaneous tissue: Normal without rashes or lesions  Neurologic      Cranial nerves: Cranial nerves 2-12 intact  Psychiatric      Orientation to person, place and time: Normal        Mood and affect: Normal           Health Management   Colon polyps   COLONOSCOPY (GI, SURG); every 3 years; Last 91Guq5415; Next Due: 00Roq0990;  Active    Future Appointments      Date/Time Provider Specialty Site   02/08/2018 10:00 AM Alcon Melendez MD [facility] [address] [place] [address]     Signatures    Electronically signed by : Anaya Lorenzo DO; Giuliano  3 2018  8:58AM EST                       (Author)

## 2018-01-10 NOTE — RESULT NOTES
Verified Results  (1) THYROXINE [id number] 08:59AM Akash Knott     Test Name Result Flag Reference   T4 (THYROXINE), TOTAL 6 8 mcg/dL  4 5-12 0     (1) CBC/PLT/DIFF 12VQL7826 08:59AM Akash Knott     Test Name Result Flag Reference   WHITE BLOOD CELL COUNT 9 4 Thousand/uL  3 8-10 8   RED BLOOD CELL COUNT 4 48 Million/uL  4 20-5 80   HEMOGLOBIN 14 6 g/dL  13 2-17 1   HEMATOCRIT 44 6 %  38 5-50 0   MCV 99 4 fL  80 0-100 0   MCH 32 5 pg  27 0-33 0   MCHC 32 8 g/dL  32 0-36 0   RDW 15 5 % H 11 0-15 0   PLATELET COUNT 055 Thousand/uL  140-400   MPV 9 2 fL  7 5-12 5   ABSOLUTE NEUTROPHILS 4324 cells/uL  4899-4365   ABSOLUTE LYMPHOCYTES 3675 cells/uL  850-3900   ABSOLUTE MONOCYTES 874 cells/uL  200-950   ABSOLUTE EOSINOPHILS 470 cells/uL     ABSOLUTE BASOPHILS 56 cells/uL  0-200   NEUTROPHILS 46 0 %     LYMPHOCYTES 39 1 %     MONOCYTES 9 3 %     EOSINOPHILS 5 0 %     BASOPHILS 0 6 %       (Q) TSH, 3RD GENERATION 62Ikd7357 08:59AM Akash Knott   REPORT COMMENT:  FASTING:YES     Test Name Result Flag Reference   TSH 6 54 mIU/L H 0 40-4 50       Plan  High risk medication use, Hypothyroidism    · (1) TSH; Status:Active; Requested for:58Yhs4009;   Hypothyroidism    · Levothyroxine Sodium 25 MCG Oral Tablet   · Levothyroxine Sodium 50 MCG Oral Tablet; take 1 tablet every day   · (1) THYROXINE; Status:Active;  Requested for:43Aot3691;

## 2018-01-10 NOTE — RESULT NOTES
Verified Results  (1) THYROXINE [id number] 07:09AM Rakesh Urbano     Test Name Result Flag Reference   T4 (THYROXINE), TOTAL 6 7 mcg/dL  4 5-12 0     (Q) TSH, 3RD GENERATION 80UVL1352 07:09AM Rakesh Urbano   REPORT COMMENT:  FASTING:NO     Test Name Result Flag Reference   TSH 3 91 mIU/L  0 40-4 50

## 2018-01-10 NOTE — RESULT NOTES
Discussion/Summary   liver enzymes has improved slightly but remain elevated  please tell patient to stop drinking alcohol  I have counseled him on alcohol abstinence during the visit  I will order followup lab in 1 months  please inform him to have the labs done  thanks  Verified Results  (1) CBC/PLT/DIFF 89YWA6848 10:34AM Bola Alex Order Number: [id number]     Test Name Result Flag Reference   WBC COUNT 9 70 Thousand/uL  4 31-10 16   RBC COUNT 4 50 Million/uL  3 88-5 62   HEMOGLOBIN 14 9 g/dL  12 0-17 0   HEMATOCRIT 43 9 %  36 5-49 3   MCV 98 fL  82-98   MCH 33 1 pg  26 8-34 3   MCHC 33 9 g/dL  31 4-37 4   RDW 14 5 %  11 6-15 1   MPV 10 1 fL  8 9-12 7   PLATELET COUNT 130 Thousands/uL  149-390   NEUTROPHILS RELATIVE PERCENT 62 %  43-75   LYMPHOCYTES RELATIVE PERCENT 25 %  14-44   MONOCYTES RELATIVE PERCENT 11 %  4-12   EOSINOPHILS RELATIVE PERCENT 2 %  0-6   BASOPHILS RELATIVE PERCENT 0 %  0-1   NEUTROPHILS ABSOLUTE COUNT 5 97 Thousands/? ??L  1 85-7 62   LYMPHOCYTES ABSOLUTE COUNT 2 46 Thousands/? ??L  0 60-4 47   MONOCYTES ABSOLUTE COUNT 1 06 Thousand/? ??L  0 17-1 22   EOSINOPHILS ABSOLUTE COUNT 0 17 Thousand/? ??L  0 00-0 61   BASOPHILS ABSOLUTE COUNT 0 04 Thousands/? ??L  0 00-0 10     (1) BASIC METABOLIC PROFILE 11UNY6926 10:34AM Evens Anguiano    Order Number: [id number]     Test Name Result Flag Reference   SODIUM 138 mmol/L  136-145   POTASSIUM 4 2 mmol/L  3 5-5 3   CHLORIDE 102 mmol/L  100-108   CARBON DIOXIDE 27 mmol/L  21-32   ANION GAP (CALC) 9 mmol/L  4-13   BLOOD UREA NITROGEN 13 mg/dL  5-25   CREATININE 1 08 mg/dL  0 60-1 30   Standardized to IDMS reference method   CALCIUM 9 0 mg/dL  8 3-10 1   eGFR 75 ml/min/1 73sq m     National Kidney Disease Education Program recommendations are as follows:  GFR calculation is accurate only with a steady state creatinine  Chronic Kidney disease less than 60 ml/min/1 73 sq  meters  Kidney failure less than 15 ml/min/1 73 sq  meters  GLUCOSE FASTING 103 mg/dL H 65-99   Specimen collection should occur prior to Sulfasalazine administration due to the potential for falsely depressed results  Specimen collection should occur prior to Sulfapyridine administration due to the potential for falsely elevated results  (1) HEPATIC FUNCTION PANEL 51QOG6748 10:34AM Tatiana Duke    Order Number: [id number]     Test Name Result Flag Reference   ALBUMIN 3 7 g/dL  3 5-5 0   ALK PHOSPHATAS 98 U/L     ALT (SGPT) 67 U/L  12-78   Specimen collection should occur prior to Sulfasalazine administration due to the potential for falsely depressed results  AST(SGOT) 74 U/L H 5-45   Specimen collection should occur prior to Sulfasalazine administration due to the potential for falsely depressed results  BILI, DIRECT 0 17 mg/dL  0 00-0 20   BILI, TOTAL 0 39 mg/dL  0 20-1 00   TOTAL PROTEIN 8 3 g/dL H 6 4-8 2       Plan  Elevated liver enzymes    · (1) HEPATIC FUNCTION PANEL; Status:Active;  Requested for:22Ddj9514;

## 2018-01-10 NOTE — RESULT NOTES
Verified Results  (1) CBC/PLT/DIFF 12Kdg8470 07:25AM Reba REARDON Order Number: [id number]     Test Name Result Flag Reference   WBC COUNT 8 99 Thousand/uL  4 31-10 16   RBC COUNT 4 43 Million/uL  3 88-5 62   HEMOGLOBIN 15 0 g/dL  12 0-17 0   HEMATOCRIT 42 6 %  36 5-49 3   MCV 96 fL  82-98   MCH 33 9 pg  26 8-34 3   MCHC 35 2 g/dL  31 4-37 4   RDW 14 4 %  11 6-15 1   MPV 10 7 fL  8 9-12 7   PLATELET COUNT 087 Thousands/uL  149-390   nRBC AUTOMATED 0 /100 WBCs     NEUTROPHILS RELATIVE PERCENT 40 % L 43-75   LYMPHOCYTES RELATIVE PERCENT 43 %  14-44   MONOCYTES RELATIVE PERCENT 12 %  4-12   EOSINOPHILS RELATIVE PERCENT 4 %  0-6   BASOPHILS RELATIVE PERCENT 1 %  0-1   NEUTROPHILS ABSOLUTE COUNT 3 60 Thousands/? ??L  1 85-7 62   LYMPHOCYTES ABSOLUTE COUNT 3 98 Thousands/? ??L  0 60-4 47   MONOCYTES ABSOLUTE COUNT 1 03 Thousand/? ??L  0 17-1 22   EOSINOPHILS ABSOLUTE COUNT 0 33 Thousand/? ??L  0 00-0 61   BASOPHILS ABSOLUTE COUNT 0 05 Thousands/? ??L  0 00-0 10     (1) COMPREHENSIVE METABOLIC PANEL 16HTC5288 33:47YS Juan Joséia Nito Order Number: [id number]     Test Name Result Flag Reference   SODIUM 139 mmol/L  136-145   POTASSIUM 3 8 mmol/L  3 5-5 3   CHLORIDE 101 mmol/L  100-108   CARBON DIOXIDE 29 mmol/L  21-32   ANION GAP (CALC) 9 mmol/L  4-13   BLOOD UREA NITROGEN 11 mg/dL  5-25   CREATININE 1 14 mg/dL  0 60-1 30   Standardized to IDMS reference method   CALCIUM 8 9 mg/dL  8 3-10 1   BILI, TOTAL 0 56 mg/dL  0 20-1 00   ALK PHOSPHATAS 111 U/L     ALT (SGPT) 104 U/L H 12-78   AST(SGOT) 106 U/L H 5-45   ALBUMIN 3 5 g/dL  3 5-5 0   TOTAL PROTEIN 8 0 g/dL  6 4-8 2   eGFR 70 ml/min/1 73sq m     National Kidney Disease Education Program recommendations are as follows:  GFR calculation is accurate only with a steady state creatinine  Chronic Kidney disease less than 60 ml/min/1 73 sq  meters  Kidney failure less than 15 ml/min/1 73 sq  meters     GLUCOSE FASTING 100 mg/dL H 65-99 (1) LIPID PANEL, FASTING 02Aug2017 07:25AM Aurea Del Rio   TW Order Number: [id number]     Test Name Result Flag Reference   CHOLESTEROL 157 mg/dL     HDL,DIRECT 51 mg/dL  40-60   Specimen collection should occur prior to Metamizole administration due to the potential for falsely depressed results  LDL CHOLESTEROL CALCULATED 56 mg/dL  0-100   Triglyceride:         Normal              <150 mg/dl       Borderline High    150-199 mg/dl       High               200-499 mg/dl       Very High          >499 mg/dl  Cholesterol:         Desirable        <200 mg/dl      Borderline High  200-239 mg/dl      High             >239 mg/dl  HDL Cholesterol:        High    >59 mg/dL      Low     <41 mg/dL  LDL CALCULATED:    This screening LDL is a calculated result  It does not have the accuracy of the Direct Measured LDL in the monitoring of patients with hyperlipidemia and/or statin therapy  Direct Measure LDL (RBQ711) must be ordered separately in these patients  TRIGLYCERIDES 251 mg/dL H <=150   Specimen collection should occur prior to N-Acetylcysteine or Metamizole administration due to the potential for falsely depressed results  (1) TSH 02Aug2017 07:25AM Aurea Del Rio   TW Order Number: [id number]     Test Name Result Flag Reference   TSH 4 357 uIU/mL H 0 358-3 740   Patients undergoing fluorescein dye angiography may retain small amounts of fluorescein in the body for 48-72 hours post procedure  Samples containing fluorescein can produce falsely depressed TSH values  If the patient had this procedure,a specimen should be resubmitted post fluorescein clearance  Plan  Elevated liver enzymes    · *1 -  [facility]  *  Status: Active   Requested for: 02Aug2017  Care Summary provided  : Yes  Elevated liver enzymes, Hypothyroidism, Medication dose changed    · (1) TSH; Status:Active;  Requested VYC:84VGX0527;   Hypothyroidism, Medication dose changed    · (1) THYROXINE; Status:Active;  Requested IJL:34WCM6581;

## 2018-01-10 NOTE — RESULT NOTES
Discussion/Summary   fatty liver on U/S, slightly dilated CBD   hepatitis panel negative  Verified Results  (1) HEP A AB,TOTAL 08SRH0246 10:57AM Al Raphael     Test Name Result Flag Reference   Hep A Ab,Total Non-reactive  Non-reactive     US ABDOMEN LIMITED 59Ujn7828 10:51AM Cheyenne Mcbride Order Number: [id number]   Performing Comments: no abdominal pain   - Patient Instructions: To schedule this appointment, please contact Central Scheduling at [phone]  Test Name Result Flag Reference   US ABDOMEN LIMITED (Report)     RIGHT UPPER QUADRANT ULTRASOUND     INDICATION: Abnormal levels of other serum enzymes     COMPARISON: None  TECHNIQUE:  Real-time ultrasound of the right upper quadrant was performed with a curvilinear transducer with both volumetric sweeps and still imaging techniques  FINDINGS:     PANCREAS: Visualized portions of the pancreas are within normal limits  AORTA AND IVC: Visualized portions are normal for patient age  LIVER:   Size: Mildly enlarged  The liver measures 21 cm in the midclavicular line  Contour: Surface contour is smooth  Parenchyma: There is mild diffuse increased echogenicity with smooth echotexture, without significant beam attenuation or loss of periportal echogenicity  Most consistent with mild hepatic steatosis  No evidence of suspicious mass  Limited imaging of the main portal vein shows it to be patent and hepatopetal       BILIARY:   The gallbladder is normal in caliber  No wall thickening or pericholecystic fluid  No stones or sludge identified  No sonographic Knott's sign  No intrahepatic biliary dilatation  CBD measures 7 mm  No choledocholithiasis  KIDNEY:    Right kidney measures 12 8 x 5 8 cm  Within normal limits  ASCITES:  None  IMPRESSION:     Hepatomegaly and hepatic steatosis  No suspicious masses identified  Common bile duct mildly dilated, measuring 7 mm  No choledocholithiasis   No cholelithiasis         Workstation performed: [id number]     Signed by:   Martina Villarreal MD   9/6/17

## 2018-01-10 NOTE — RESULT NOTES
Verified Results  (Q) BASIC METABOLIC PANEL W/O CA 21WJY2313 12:00AM Fiona Gandhi     Test Name Result Flag Reference   GLUCOSE 122 mg/dL H 65-99   Fasting reference interval   UREA NITROGEN (BUN) 11 mg/dL  7-25   CREATININE 1 06 mg/dL  0 70-1 33   For patients >52years of age, the reference limit  for Creatinine is approximately 13% higher for people  identified as -American  eGFR NON-AFR   AMERICAN 78 mL/min/1 73m2  > OR = 60   eGFR AFRICAN AMERICAN 90 mL/min/1 73m2  > OR = 60   BUN/CREATININE RATIO   0-61   NOT APPLICABLE (calc)   SODIUM 136 mmol/L  135-146   POTASSIUM 3 8 mmol/L  3 5-5 3   CHLORIDE 96 mmol/L L    CARBON DIOXIDE 27 mmol/L  19-30

## 2018-01-11 NOTE — RESULT NOTES
Verified Results  (1) CBC/PLT/DIFF 25Aug2016 09:53AM [facility]     Test Name Result Flag Reference   WHITE BLOOD CELL COUNT 7 8 Thousand/uL  3 8-10 8   RED BLOOD CELL COUNT 4 36 Million/uL  4 20-5 80   HEMOGLOBIN 14 4 g/dL  13 2-17 1   HEMATOCRIT 43 2 %  38 5-50 0   MCV 99 2 fL  80 0-100 0   MCH 33 0 pg  27 0-33 0   MCHC 33 3 g/dL  32 0-36 0   RDW 14 5 %  11 0-15 0   PLATELET COUNT 948 Thousand/uL  140-400   MPV 9 4 fL  7 5-11 5   ABSOLUTE NEUTROPHILS 3221 cells/uL  0213-6083   ABSOLUTE LYMPHOCYTES 3338 cells/uL  850-3900   ABSOLUTE MONOCYTES 866 cells/uL  200-950   ABSOLUTE EOSINOPHILS 296 cells/uL     ABSOLUTE BASOPHILS 78 cells/uL  0-200   NEUTROPHILS 41 3 %     LYMPHOCYTES 42 8 %     MONOCYTES 11 1 %     EOSINOPHILS 3 8 %     BASOPHILS 1 0 %       (1) COMPREHENSIVE METABOLIC PANEL 50HWZ7617 83:80SU [facility]     Test Name Result Flag Reference   GLUCOSE 86 mg/dL  65-99   Fasting reference interval   UREA NITROGEN (BUN) 15 mg/dL  7-25   CREATININE 1 16 mg/dL  0 70-1 33   For patients >52years of age, the reference limit  for Creatinine is approximately 13% higher for people  identified as -American  eGFR NON-AFR   AMERICAN 70 mL/min/1 73m2  > OR = 60   eGFR AFRICAN AMERICAN 81 mL/min/1 73m2  > OR = 60   BUN/CREATININE RATIO   2-17   NOT APPLICABLE (calc)   SODIUM 138 mmol/L  135-146   POTASSIUM 4 9 mmol/L  3 5-5 3   CHLORIDE 101 mmol/L     CARBON DIOXIDE 27 mmol/L  20-31   CALCIUM 9 3 mg/dL  8 6-10 3   PROTEIN, TOTAL 7 3 g/dL  6 1-8 1   ALBUMIN 4 2 g/dL  3 6-5 1   GLOBULIN 3 1 g/dL (calc)  1 9-3 7   ALBUMIN/GLOBULIN RATIO 1 4 (calc)  1 0-2 5   BILIRUBIN, TOTAL 0 6 mg/dL  0 2-1 2   ALKALINE PHOSPHATASE 77 U/L     AST 74 U/L H 10-35   ALT 66 U/L H 9-46     (1) LIPID PANEL, FASTING 15Xun0545 09:53AM [facility]     Test Name Result Flag Reference   CHOLESTEROL, TOTAL 132 mg/dL  125-200   HDL CHOLESTEROL 40 mg/dL  > OR = 40   TRIGLICERIDES 515 mg/dL H <150 LDL-CHOLESTEROL 23 mg/dL (calc)  <130   Desirable range <100 mg/dL for patients with CHD or  diabetes and <70 mg/dL for diabetic patients with  known heart disease  CHOL/HDLC RATIO 3 3 (calc)  < OR = 5 0   NON HDL CHOLESTEROL 92 mg/dL (calc)     Target for non-HDL cholesterol is 30 mg/dL higher than   LDL cholesterol target       (Q) TSH, 3RD GENERATION 97Cnm5103 09:53AM Rubbiedgar Milks   REPORT COMMENT:  FASTING:YES     Test Name Result Flag Reference   TSH 4 12 mIU/L  0 40-4 50

## 2018-01-12 VITALS
BODY MASS INDEX: 23.53 KG/M2 | SYSTOLIC BLOOD PRESSURE: 148 MMHG | WEIGHT: 183.38 LBS | HEIGHT: 74 IN | HEART RATE: 80 BPM | DIASTOLIC BLOOD PRESSURE: 86 MMHG

## 2018-01-12 NOTE — RESULT NOTES
Verified Results  (1) PSA (SCREEN) (Dx V76 44 Screen for Prostate Cancer) 12Apr2016 01:12PM Stacie Cavanaugh Order Number: [id number]     Order Number: [id number]     Test Name Result Flag Reference   PROSTATE SPECIFIC ANTIGEN 0 6 ng/mL  0 0-4 0

## 2018-01-13 VITALS
SYSTOLIC BLOOD PRESSURE: 124 MMHG | BODY MASS INDEX: 24.82 KG/M2 | HEIGHT: 74 IN | DIASTOLIC BLOOD PRESSURE: 80 MMHG | WEIGHT: 193.38 LBS

## 2018-01-13 VITALS
BODY MASS INDEX: 23.37 KG/M2 | DIASTOLIC BLOOD PRESSURE: 82 MMHG | SYSTOLIC BLOOD PRESSURE: 142 MMHG | HEIGHT: 74 IN | WEIGHT: 182.13 LBS

## 2018-01-13 VITALS
BODY MASS INDEX: 24.73 KG/M2 | SYSTOLIC BLOOD PRESSURE: 120 MMHG | TEMPERATURE: 98 F | DIASTOLIC BLOOD PRESSURE: 80 MMHG | WEIGHT: 190 LBS

## 2018-01-14 VITALS
SYSTOLIC BLOOD PRESSURE: 164 MMHG | BODY MASS INDEX: 24.9 KG/M2 | DIASTOLIC BLOOD PRESSURE: 82 MMHG | WEIGHT: 194 LBS | TEMPERATURE: 98.5 F | HEIGHT: 74 IN | RESPIRATION RATE: 18 BRPM | HEART RATE: 86 BPM

## 2018-01-15 VITALS
HEART RATE: 94 BPM | DIASTOLIC BLOOD PRESSURE: 103 MMHG | OXYGEN SATURATION: 98 % | HEIGHT: 74 IN | TEMPERATURE: 97 F | WEIGHT: 185 LBS | SYSTOLIC BLOOD PRESSURE: 142 MMHG | BODY MASS INDEX: 23.74 KG/M2

## 2018-01-15 NOTE — RESULT NOTES
Verified Results  (1) CBC/PLT/DIFF 20Feb2017 06:33AM [facility]Go Dishe Milks     Test Name Result Flag Reference   WHITE BLOOD CELL COUNT 11 2 Thousand/uL H 3 8-10 8   RED BLOOD CELL COUNT 4 41 Million/uL  4 20-5 80   HEMOGLOBIN 14 2 g/dL  13 2-17 1   HEMATOCRIT 44 6 %  38 5-50 0    1 fL H 80 0-100 0   MCH 32 2 pg  27 0-33 0   MCHC 31 9 g/dL L 32 0-36 0   RDW 14 5 %  11 0-15 0   PLATELET COUNT 578 Thousand/uL  140-400   MPV 12 0 fL  7 5-12 5   ABSOLUTE NEUTROPHILS 5880 cells/uL  7376-5382   ABSOLUTE LYMPHOCYTES 3685 cells/uL  850-3900   ABSOLUTE MONOCYTES 941 cells/uL  200-950   ABSOLUTE EOSINOPHILS 661 cells/uL H    ABSOLUTE BASOPHILS 34 cells/uL  0-200   NEUTROPHILS 52 5 %     LYMPHOCYTES 32 9 %     MONOCYTES 8 4 %     EOSINOPHILS 5 9 %     BASOPHILS 0 3 %       (1) COMPREHENSIVE METABOLIC PANEL 18OJO8293 37:32YE [facility] Milks     Test Name Result Flag Reference   GLUCOSE 79 mg/dL  65-99   Fasting reference interval   UREA NITROGEN (BUN) 14 mg/dL  7-25   CREATININE 1 15 mg/dL  0 70-1 33   For patients >52years of age, the reference limit  for Creatinine is approximately 13% higher for people  identified as -American  eGFR NON-AFR   AMERICAN 70 mL/min/1 73m2  > OR = 60   eGFR AFRICAN AMERICAN 81 mL/min/1 73m2  > OR = 60   BUN/CREATININE RATIO   2-08   NOT APPLICABLE (calc)   SODIUM 137 mmol/L  135-146   POTASSIUM 4 6 mmol/L  3 5-5 3   CHLORIDE 101 mmol/L     CARBON DIOXIDE 27 mmol/L  20-31   CALCIUM 9 4 mg/dL  8 6-10 3   PROTEIN, TOTAL 7 5 g/dL  6 1-8 1   ALBUMIN 4 2 g/dL  3 6-5 1   GLOBULIN 3 3 g/dL (calc)  1 9-3 7   ALBUMIN/GLOBULIN RATIO 1 3 (calc)  1 0-2 5   BILIRUBIN, TOTAL 0 7 mg/dL  0 2-1 2   ALKALINE PHOSPHATASE 85 U/L     AST 51 U/L H 10-35   ALT 43 U/L  9-46     (1) LIPID PANEL, FASTING 20Feb2017 06:33AM Rubbie Milks     Test Name Result Flag Reference   CHOLESTEROL, TOTAL 129 mg/dL  125-200   HDL CHOLESTEROL 41 mg/dL  > OR = 40   TRIGLICERIDES 598 mg/dL H <150 LDL-CHOLESTEROL  mg/dL (calc)  <130   LDL cholesterol not calculated  Triglyceride levels  greater than 400 mg/dL invalidate calculated LDL results  Desirable range <100 mg/dL for patients with CHD or  diabetes and <70 mg/dL for diabetic patients with  known heart disease  CHOL/HDLC RATIO 3 1 (calc)  < OR = 5 0   NON HDL CHOLESTEROL 88 mg/dL (calc)     Target for non-HDL cholesterol is 30 mg/dL higher than   LDL cholesterol target  (Q) TSH, 3RD GENERATION 12Mwk7565 06:33AM Ervin Negrete   REPORT COMMENT:  FASTING:YES     Test Name Result Flag Reference   TSH 5 59 mIU/L H 0 40-4 50       Plan  Hypothyroidism    · Levothyroxine Sodium 25 MCG Oral Tablet; Take 1 tablet daily   · (1) THYROXINE; Status:Active; Requested for:18Apr2017;    · (1) TSH; Status:Active; Requested for:18Apr2017;   Hypothyroidism, Leukocytosis    · (1) CBC/PLT/DIFF; Status:Active;  Requested for:18Apr2017;

## 2018-01-16 NOTE — MISCELLANEOUS
Message  Dr Danyell Oakley completed colono for Dr Krishna Steward on 8/1/2016  Mailed colono letter to patients home address  Tasked PCPs office  {Updated pre-visit planning to reflect 3 year follow up  }      Active Problems    1  Alcohol dependence (303 90) (F10 20)   2  Bilateral leg edema (782 3) (R60 0)   3  Colon polyps (211 3) (K63 5)   4  GERD without esophagitis (530 81) (K21 9)   5  High risk medication use (V58 69) (Z79 899)   6  Hyperlipidemia (272 4) (E78 5)   7  Hypertension (401 9) (I10)   8  Screening for prostate cancer (V76 44) (Z12 5)   9  Traumatic brain injury (854 00) (U05 4O5Q)    Current Meds   1  AmLODIPine Besylate 5 MG Oral Tablet; Take 1 tablet daily  Requested for: 53Ecn2070;   Last Rx:04Eew4217 Ordered   2  Atorvastatin Calcium 40 MG Oral Tablet; Take 1 tablet daily  Requested for: 55Ztd0308;   Last Rx:47Oot7057 Ordered   3  Famotidine 20 MG Oral Tablet; TAKE 1 TABLET TWICE DAILY  Requested for:   86Gmi4062; Last Rx:97Oey9515 Ordered   4  Furosemide 20 MG Oral Tablet (Lasix); TAKE 1 TABLET DAILY; Therapy: 54PZF1614 to (Madhav Soliz)  Requested for: 82YWA7610; Last   CV:78CBU6398 Ordered   5  Potassium Chloride ER 10 MEQ Oral Capsule Extended Release; take 1 capsule daily; Therapy: 63OLA0195 to (Last Rx:47Fza6385)  Requested for: 75Hny2771 Ordered   6  Suprep Bowel Prep Oral Solution; USE AS DIRECTED; Therapy: 81PTV0650 to (Last Rx:34Thm2385)  Requested for: 89BSD7024 Ordered    Allergies    1  Penicillins    Plan  Colon polyps    · COLONOSCOPY (GI, SURG) ; every 3 years;  Last 43Orc3351; Next 57Ltc6397;  Status:Active    Signatures   Electronically signed by : Burke Avelar, ; Aug 26 2016  2:43PM EST                       (Author)

## 2018-01-17 NOTE — MISCELLANEOUS
Message   Recorded as Task   Date: 08/11/2016 09:47 AM, Created By: Patty Ayala   Task Name: Go to Result   Assigned To: [facility],Team   Regarding Patient: Odette Schroeder, Status: Active   CommentRandel Hamm - 11 Aug 2016 9:47 AM     TASK CREATED  Three-year follow-up for serrated adenoma  Call patient  Kishor Ohs - 12 Aug 2016 2:16 PM     TASK EDITED  Called patient with results and informed him that the polyp that was removed was a serrated adenoma which was negative for cancer but it is a high risk polyp that over time it could turn to cancer  There is no concern for any polyps at this time because they were removed but it is important to follow-up in 3 years for a colonoscopy to monitor for any new polyps  A reminder letter will be sent in the mail shortly but he will have to remember to call in 3 years to schedule an appointment  Patient verbalized understanding and had no questions or concerns  Active Problems    1  Alcohol dependence (303 90) (F10 20)   2  Bilateral leg edema (782 3) (R60 0)   3  Colon polyps (211 3) (K63 5)   4  GERD without esophagitis (530 81) (K21 9)   5  High risk medication use (V58 69) (Z79 899)   6  Hyperlipidemia (272 4) (E78 5)   7  Hypertension (401 9) (I10)   8  Screening for prostate cancer (V76 44) (Z12 5)   9  Traumatic brain injury (854 00) (V86 9A6Y)    Current Meds   1  AmLODIPine Besylate 5 MG Oral Tablet; Take 1 tablet daily  Requested for: 20Hag2820;   Last Rx:02Hzh1020 Ordered   2  Atorvastatin Calcium 40 MG Oral Tablet; Take 1 tablet daily  Requested for: 60Rby0681;   Last Rx:02Tqn5578 Ordered   3  Famotidine 20 MG Oral Tablet; TAKE 1 TABLET TWICE DAILY  Requested for:   38Hdj9200; Last Rx:83Gty4106 Ordered   4  Furosemide 20 MG Oral Tablet (Lasix); TAKE 1 TABLET DAILY; Therapy: 31KCE9559 to (Mckay Fagan)  Requested for: 21TEJ8051; Last   UV:63KHI2375 Ordered   5   Potassium Chloride ER 10 MEQ Oral Capsule Extended Release; take 1 capsule daily; Therapy: 04EGH8980 to (Last Rx:54Bch5097)  Requested for: 88Drp9425 Ordered   6  Suprep Bowel Prep Oral Solution; USE AS DIRECTED; Therapy: 84EZL2260 to (Last Rx:64Ozr7189)  Requested for: 77YGJ7673 Ordered    Allergies    1   Penicillins    Signatures   Electronically signed by : Marichuy Garcia, ; Aug 12 2016  2:17PM EST                       (Author)

## 2018-01-22 VITALS
BODY MASS INDEX: 24.77 KG/M2 | SYSTOLIC BLOOD PRESSURE: 124 MMHG | WEIGHT: 193 LBS | HEIGHT: 74 IN | DIASTOLIC BLOOD PRESSURE: 66 MMHG | TEMPERATURE: 98.2 F

## 2018-01-22 VITALS
BODY MASS INDEX: 23.23 KG/M2 | TEMPERATURE: 97.9 F | OXYGEN SATURATION: 96 % | SYSTOLIC BLOOD PRESSURE: 163 MMHG | DIASTOLIC BLOOD PRESSURE: 93 MMHG | HEART RATE: 102 BPM | WEIGHT: 181 LBS | HEIGHT: 74 IN

## 2018-01-23 NOTE — MISCELLANEOUS
Message  GI Reminder Recall [address] [place] [address] [address]:   Date: 12/21/2017   Dear Kathrin Kennedy:     Review of our records shows you are due for the following: Results  Please call the following office to schedule your appointment:   [address], [address], [place], [address] [facility] [phone]  We look forward to hearing from you!      Sincerely,     [facility]      Signatures   Electronically signed by : Pam Kyle, ; Dec 21 2017 10:18AM EST                       (Author)

## 2018-01-23 NOTE — RESULT NOTES
Discussion/Summary   LFTs remains the same  albumin is decreasing  make sure patient has follow up with me  also I can not see the special test report  Verified Results  (1) HEPATIC FUNCTION PANEL 70Ftq6226 07:50AM Avani Burton    Order Number: [id number]     Test Name Result Flag Reference   ALBUMIN 3 4 g/dL L 3 5-5 0   ALK PHOSPHATAS 114 U/L     ALT (SGPT) 70 U/L  12-78   Specimen collection should occur prior to Sulfasalazine administration due to the potential for falsely depressed results  AST(SGOT) 74 U/L H 5-45   Specimen collection should occur prior to Sulfasalazine administration due to the potential for falsely depressed results     BILI, DIRECT 0 31 mg/dL H 0 00-0 20   BILI, TOTAL 0 97 mg/dL  0 20-1 00   TOTAL PROTEIN 7 7 g/dL  6 4-8 2     (1) SPECIAL TEST 14Bbv0986 10:37AM Avani Burton     Test Name Result Flag Reference   TEST RESULT SEE WRITTEN REPORT

## 2018-01-30 ENCOUNTER — TELEPHONE (OUTPATIENT)
Dept: GASTROENTEROLOGY | Facility: CLINIC | Age: 58
End: 2018-01-30

## 2018-01-31 NOTE — TELEPHONE ENCOUNTER
Called pt and told him that Dr Leon Ryan order blood work at his last office appointment 11/28/2017, he stated he would have it done

## 2018-02-02 ENCOUNTER — APPOINTMENT (OUTPATIENT)
Dept: LAB | Facility: HOSPITAL | Age: 58
End: 2018-02-02
Payer: MEDICARE

## 2018-02-02 DIAGNOSIS — I10 ESSENTIAL (PRIMARY) HYPERTENSION: ICD-10-CM

## 2018-02-02 LAB
ALBUMIN SERPL BCP-MCNC: 3.5 G/DL (ref 3.5–5)
ALP SERPL-CCNC: 106 U/L (ref 46–116)
ALT SERPL W P-5'-P-CCNC: 82 U/L (ref 12–78)
ANION GAP SERPL CALCULATED.3IONS-SCNC: 9 MMOL/L (ref 4–13)
ANISOCYTOSIS BLD QL SMEAR: PRESENT
AST SERPL W P-5'-P-CCNC: 76 U/L (ref 5–45)
BASOPHILS # BLD MANUAL: 0 THOUSAND/UL (ref 0–0.1)
BASOPHILS NFR MAR MANUAL: 0 % (ref 0–1)
BILIRUB SERPL-MCNC: 0.53 MG/DL (ref 0.2–1)
BUN SERPL-MCNC: 18 MG/DL (ref 5–25)
CALCIUM SERPL-MCNC: 8.5 MG/DL (ref 8.3–10.1)
CHLORIDE SERPL-SCNC: 100 MMOL/L (ref 100–108)
CHOLEST SERPL-MCNC: 149 MG/DL (ref 50–200)
CO2 SERPL-SCNC: 28 MMOL/L (ref 21–32)
CREAT SERPL-MCNC: 1.18 MG/DL (ref 0.6–1.3)
EOSINOPHIL # BLD MANUAL: 0.16 THOUSAND/UL (ref 0–0.4)
EOSINOPHIL NFR BLD MANUAL: 2 % (ref 0–6)
ERYTHROCYTE [DISTWIDTH] IN BLOOD BY AUTOMATED COUNT: 14.5 % (ref 11.6–15.1)
GFR SERPL CREATININE-BSD FRML MDRD: 68 ML/MIN/1.73SQ M
GLUCOSE P FAST SERPL-MCNC: 100 MG/DL (ref 65–99)
HCT VFR BLD AUTO: 43.6 % (ref 36.5–49.3)
HDLC SERPL-MCNC: 42 MG/DL (ref 40–60)
HGB BLD-MCNC: 14.9 G/DL (ref 12–17)
LYMPHOCYTES # BLD AUTO: 3.56 THOUSAND/UL (ref 0.6–4.47)
LYMPHOCYTES # BLD AUTO: 45 % (ref 14–44)
MCH RBC QN AUTO: 33.5 PG (ref 26.8–34.3)
MCHC RBC AUTO-ENTMCNC: 34.2 G/DL (ref 31.4–37.4)
MCV RBC AUTO: 98 FL (ref 82–98)
MONOCYTES # BLD AUTO: 0.87 THOUSAND/UL (ref 0–1.22)
MONOCYTES NFR BLD: 11 % (ref 4–12)
NEUTROPHILS # BLD MANUAL: 3.32 THOUSAND/UL (ref 1.85–7.62)
NEUTS BAND NFR BLD MANUAL: 3 % (ref 0–8)
NEUTS SEG NFR BLD AUTO: 39 % (ref 43–75)
NRBC BLD AUTO-RTO: 0 /100 WBCS
PLATELET # BLD AUTO: 229 THOUSANDS/UL (ref 149–390)
PLATELET BLD QL SMEAR: ADEQUATE
PMV BLD AUTO: 10.7 FL (ref 8.9–12.7)
POTASSIUM SERPL-SCNC: 4.3 MMOL/L (ref 3.5–5.3)
PROT SERPL-MCNC: 8 G/DL (ref 6.4–8.2)
RBC # BLD AUTO: 4.45 MILLION/UL (ref 3.88–5.62)
SODIUM SERPL-SCNC: 137 MMOL/L (ref 136–145)
TOTAL CELLS COUNTED SPEC: 100
TRIGL SERPL-MCNC: 501 MG/DL
TSH SERPL DL<=0.05 MIU/L-ACNC: 2.71 UIU/ML (ref 0.36–3.74)
WBC # BLD AUTO: 7.91 THOUSAND/UL (ref 4.31–10.16)

## 2018-02-02 PROCEDURE — 85027 COMPLETE CBC AUTOMATED: CPT

## 2018-02-02 PROCEDURE — 36415 COLL VENOUS BLD VENIPUNCTURE: CPT

## 2018-02-02 PROCEDURE — 80053 COMPREHEN METABOLIC PANEL: CPT

## 2018-02-02 PROCEDURE — 85007 BL SMEAR W/DIFF WBC COUNT: CPT

## 2018-02-02 PROCEDURE — 80061 LIPID PANEL: CPT

## 2018-02-02 PROCEDURE — 84443 ASSAY THYROID STIM HORMONE: CPT

## 2018-02-08 ENCOUNTER — OFFICE VISIT (OUTPATIENT)
Dept: GASTROENTEROLOGY | Facility: CLINIC | Age: 58
End: 2018-02-08
Payer: MEDICARE

## 2018-02-08 VITALS
HEIGHT: 73 IN | DIASTOLIC BLOOD PRESSURE: 90 MMHG | HEART RATE: 112 BPM | WEIGHT: 191.4 LBS | TEMPERATURE: 98.5 F | BODY MASS INDEX: 25.37 KG/M2 | SYSTOLIC BLOOD PRESSURE: 166 MMHG

## 2018-02-08 DIAGNOSIS — R74.8 ABNORMAL LIVER ENZYMES: Primary | ICD-10-CM

## 2018-02-08 PROCEDURE — 99214 OFFICE O/P EST MOD 30 MIN: CPT | Performed by: INTERNAL MEDICINE

## 2018-02-08 RX ORDER — LEVOTHYROXINE SODIUM 0.05 MG/1
1 TABLET ORAL DAILY
COMMUNITY
Start: 2017-04-18 | End: 2018-04-24 | Stop reason: SDUPTHER
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
## 2018-02-14 ENCOUNTER — HOSPITAL ENCOUNTER (OUTPATIENT)
Dept: RADIOLOGY | Facility: HOSPITAL | Age: 58
Discharge: HOME | End: 2018-02-14
Attending: INTERNAL MEDICINE | Admitting: RADIOLOGY
Payer: MEDICARE

## 2018-02-14 VITALS
DIASTOLIC BLOOD PRESSURE: 69 MMHG | OXYGEN SATURATION: 95 % | RESPIRATION RATE: 16 BRPM | HEART RATE: 65 BPM | SYSTOLIC BLOOD PRESSURE: 140 MMHG | TEMPERATURE: 98.2 F

## 2018-02-14 DIAGNOSIS — R74.8 ACID PHOSPHATASE ELEVATED: ICD-10-CM

## 2018-02-14 LAB
INR PPP: 1.09 (ref 0.86–1.16)
PROTHROMBIN TIME: 14.1 SECONDS (ref 12.1–14.4)

## 2018-02-14 PROCEDURE — 76942 ECHO GUIDE FOR BIOPSY: CPT

## 2018-02-14 PROCEDURE — 88313 SPECIAL STAINS GROUP 2: CPT | Performed by: INTERNAL MEDICINE

## 2018-02-14 PROCEDURE — 85610 PROTHROMBIN TIME: CPT | Performed by: RADIOLOGY

## 2018-02-14 PROCEDURE — 88307 TISSUE EXAM BY PATHOLOGIST: CPT | Performed by: INTERNAL MEDICINE

## 2018-02-14 PROCEDURE — 76942 ECHO GUIDE FOR BIOPSY: CPT | Performed by: RADIOLOGY

## 2018-02-14 PROCEDURE — 88307 TISSUE EXAM BY PATHOLOGIST: CPT | Performed by: PATHOLOGY

## 2018-02-14 PROCEDURE — 88313 SPECIAL STAINS GROUP 2: CPT | Performed by: PATHOLOGY

## 2018-02-14 PROCEDURE — 47000 NEEDLE BIOPSY OF LIVER PERQ: CPT | Performed by: RADIOLOGY

## 2018-02-14 PROCEDURE — 47000 NEEDLE BIOPSY OF LIVER PERQ: CPT

## 2018-02-14 RX ORDER — SODIUM CHLORIDE 9 MG/ML
75 INJECTION, SOLUTION INTRAVENOUS CONTINUOUS
Status: DISCONTINUED | OUTPATIENT
Start: 2018-02-14 | End: 2018-02-15 | Stop reason: HOSPADM

## 2018-02-14 RX ORDER — MIDAZOLAM HYDROCHLORIDE 1 MG/ML
INJECTION INTRAMUSCULAR; INTRAVENOUS CODE/TRAUMA/SEDATION MEDICATION
Status: COMPLETED | OUTPATIENT
Start: 2018-02-14 | End: 2018-02-14

## 2018-02-14 RX ORDER — FENTANYL CITRATE 50 UG/ML
INJECTION, SOLUTION INTRAMUSCULAR; INTRAVENOUS CODE/TRAUMA/SEDATION MEDICATION
Status: COMPLETED | OUTPATIENT
Start: 2018-02-14 | End: 2018-02-14

## 2018-02-14 RX ADMIN — MIDAZOLAM 1 MG: 1 INJECTION INTRAMUSCULAR; INTRAVENOUS at 14:07

## 2018-02-14 RX ADMIN — MIDAZOLAM 1 MG: 1 INJECTION INTRAMUSCULAR; INTRAVENOUS at 14:03

## 2018-02-14 RX ADMIN — FENTANYL CITRATE 50 MCG: 50 INJECTION INTRAMUSCULAR; INTRAVENOUS at 14:11

## 2018-02-14 RX ADMIN — FENTANYL CITRATE 50 MCG: 50 INJECTION INTRAMUSCULAR; INTRAVENOUS at 14:04

## 2018-02-14 RX ADMIN — FENTANYL CITRATE 50 MCG: 50 INJECTION INTRAMUSCULAR; INTRAVENOUS at 14:07

## 2018-02-14 RX ADMIN — SODIUM CHLORIDE 75 ML/HR: 0.9 INJECTION, SOLUTION INTRAVENOUS at 12:34

## 2018-02-14 RX ADMIN — MIDAZOLAM 1 MG: 1 INJECTION INTRAMUSCULAR; INTRAVENOUS at 14:10

## 2018-02-14 NOTE — DISCHARGE INSTRUCTIONS
Percutaneous Liver Biopsy   WHAT YOU NEED TO KNOW:   A PLB is a procedure to remove a sample of tissue from your liver  The sample can be sent to a lab and tested for liver disease, cancer, or infection  After the procedure you may have pain and bruising at the biopsy site  You may also have pain in your right shoulder  These symptoms should get better in 48 to 72 hours  DISCHARGE INSTRUCTIONS:     [facility] patients,  Contact Interventional Radiology at [phone] PATIENTS: Contact Interventional Radiology at [phone]   [facility] PATIENTS: Contact Interventional Radiology at [phone] if:    · Fever greater than 101 or chills  · You have severe pain in your abdomen  · Your abdomen is larger than usual and feels hard  · Your neck is more swollen and you have trouble swallowing  · You feel weak or dizzy  · Your heart is beating faster than usual    · Your pain does not get better after you take pain medicine  · Your wound is red, swollen, or draining pus  · You have nausea or are vomiting  · Your skin is itchy, swollen, or you have a rash  · You have questions or concerns about your condition or care  Medicines:   · Acetaminophen decreases pain and fever  It is available without a doctor's order  Acetaminophen can cause liver damage if not taken correctly  · Take your home medicine as directed  · Resume your normal diet  Small sips of flat soda will help with mild nausea  Self-care:   · Rest as directed  Do not play sports, exercise, or lift anything heavier than 5 pounds for up to 1 week  · Apply firm, steady pressure if bleeding occurs  A small amount of bleeding from your wound is possible  Apply pressure with a clean gauze or towel for 5 to 10 minutes  Call 911 if bleeding becomes heavy or does not stop  · Ask your healthcare provider when to take your blood thinner or antiplatelet medicine   You may need to wait 24 to 72 hours to take your medicine  This will prevent bleeding  Follow up with your healthcare provider as directed: Write down your questions so you remember to ask them during your visits

## 2018-02-14 NOTE — BRIEF OP NOTE (RAD/CATH)
IR IMAGE GUIDED BIOPSY/ASPIRATION LIVER  Procedure Note    PATIENT NAME: Kwasi Knox  : 1960  MRN: [id number]     Pre-op Diagnosis:   1  Acid phosphatase elevated      Post-op Diagnosis:   1   Acid phosphatase elevated        Surgeon:   Vianey Scott MD  Assistants:     No qualified resident was available, Resident is only observing    Estimated Blood Loss: Minimal  Findings: Random liver core biopsy with ultrasound guidance    Specimens: 18 gauge liver core x 3    Complications:  none    Anesthesia: Conscious sedation and Local    Vianey Scott MD     Date: 2018  Time: 2:16 PM

## 2018-02-14 NOTE — SEDATION DOCUMENTATION
Liver core biopsy completed without complications  VSS   Discharge to Same Day Surgery for four hour bedrest

## 2018-03-01 ENCOUNTER — TELEPHONE (OUTPATIENT)
Dept: GASTROENTEROLOGY | Facility: MEDICAL CENTER | Age: 58
End: 2018-03-01

## 2018-03-01 NOTE — TELEPHONE ENCOUNTER
----- Message from Fabiola Sarah MD sent at 2/28/2018  3:43 PM EST -----  I tried to call the patient regarding the liver biopsy result but no one picked up the phone  Please let him know he has scar tissue found in the liver biopsy and he needs to stop drinking alcohol ( I have emphasized this to him multiple times)  I will see him in May for follow up  Let me know if he has any questions   Thanks      ----- Message -----  From: Lab, Background User  Sent: 2/16/2018  12:04 PM  To: Fabiola Sarah MD

## 2018-04-11 DIAGNOSIS — I10 ESSENTIAL HYPERTENSION: Primary | ICD-10-CM

## 2018-04-11 RX ORDER — AMLODIPINE BESYLATE 5 MG/1
5 TABLET ORAL 2 TIMES DAILY
Qty: 180 TABLET | Refills: 1 | Status: SHIPPED | OUTPATIENT
Start: 2018-04-11 | End: 2018-10-22 | Stop reason: SDUPTHER

## 2018-04-12 ENCOUNTER — TELEPHONE (OUTPATIENT)
Dept: FAMILY MEDICINE CLINIC | Facility: CLINIC | Age: 58
End: 2018-04-12

## 2018-04-24 DIAGNOSIS — K21.9 GASTROESOPHAGEAL REFLUX DISEASE WITHOUT ESOPHAGITIS: ICD-10-CM

## 2018-04-24 DIAGNOSIS — E03.9 ACQUIRED HYPOTHYROIDISM: Primary | ICD-10-CM

## 2018-04-24 RX ORDER — LEVOTHYROXINE SODIUM 0.05 MG/1
50 TABLET ORAL DAILY
Qty: 90 TABLET | Refills: 1 | Status: SHIPPED | OUTPATIENT
Start: 2018-04-24 | End: 2018-10-22 | Stop reason: SDUPTHER

## 2018-04-24 RX ORDER — FAMOTIDINE 20 MG/1
20 TABLET, FILM COATED ORAL 2 TIMES DAILY
Qty: 180 TABLET | Refills: 1 | Status: SHIPPED | OUTPATIENT
Start: 2018-04-24 | End: 2018-10-22 | Stop reason: SDUPTHER

## 2018-05-23 ENCOUNTER — OFFICE VISIT (OUTPATIENT)
Dept: GASTROENTEROLOGY | Facility: MEDICAL CENTER | Age: 58
End: 2018-05-23
Payer: MEDICARE

## 2018-05-23 VITALS
SYSTOLIC BLOOD PRESSURE: 140 MMHG | HEIGHT: 73 IN | DIASTOLIC BLOOD PRESSURE: 80 MMHG | WEIGHT: 200 LBS | TEMPERATURE: 97.6 F | BODY MASS INDEX: 26.51 KG/M2 | HEART RATE: 82 BPM

## 2018-05-23 DIAGNOSIS — K74.00 LIVER FIBROSIS: Primary | ICD-10-CM

## 2018-05-23 PROCEDURE — 99214 OFFICE O/P EST MOD 30 MIN: CPT | Performed by: INTERNAL MEDICINE
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
## 2018-05-23 NOTE — PATIENT INSTRUCTIONS
EGD scheduled 6/28/2018 with Dr Krishna Steward at the [address] [place] [address]  Instructions giving to patient
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
## 2018-05-24 ENCOUNTER — APPOINTMENT (OUTPATIENT)
Dept: LAB | Facility: HOSPITAL | Age: 58
End: 2018-05-24
Attending: INTERNAL MEDICINE
Payer: MEDICARE

## 2018-05-24 ENCOUNTER — HOSPITAL ENCOUNTER (OUTPATIENT)
Dept: ULTRASOUND IMAGING | Facility: HOSPITAL | Age: 58
Discharge: HOME | End: 2018-05-24
Attending: INTERNAL MEDICINE
Payer: MEDICARE

## 2018-05-24 DIAGNOSIS — K74.00 LIVER FIBROSIS: ICD-10-CM

## 2018-05-24 LAB
AFP-TM SERPL-MCNC: 2.5 NG/ML (ref 0.5–8)
ALBUMIN SERPL BCP-MCNC: 3.9 G/DL (ref 3.5–5)
ALP SERPL-CCNC: 97 U/L (ref 46–116)
ALT SERPL W P-5'-P-CCNC: 118 U/L (ref 12–78)
ANION GAP SERPL CALCULATED.3IONS-SCNC: 8 MMOL/L (ref 4–13)
AST SERPL W P-5'-P-CCNC: 81 U/L (ref 5–45)
BASOPHILS # BLD AUTO: 0.06 THOUSANDS/ΜL (ref 0–0.1)
BASOPHILS NFR BLD AUTO: 1 % (ref 0–1)
BILIRUB SERPL-MCNC: 0.49 MG/DL (ref 0.2–1)
BUN SERPL-MCNC: 18 MG/DL (ref 5–25)
CALCIUM SERPL-MCNC: 9.1 MG/DL (ref 8.3–10.1)
CHLORIDE SERPL-SCNC: 105 MMOL/L (ref 100–108)
CO2 SERPL-SCNC: 28 MMOL/L (ref 21–32)
CREAT SERPL-MCNC: 0.96 MG/DL (ref 0.6–1.3)
EOSINOPHIL # BLD AUTO: 0.34 THOUSAND/ΜL (ref 0–0.61)
EOSINOPHIL NFR BLD AUTO: 4 % (ref 0–6)
ERYTHROCYTE [DISTWIDTH] IN BLOOD BY AUTOMATED COUNT: 13.5 % (ref 11.6–15.1)
GFR SERPL CREATININE-BSD FRML MDRD: 87 ML/MIN/1.73SQ M
GLUCOSE P FAST SERPL-MCNC: 98 MG/DL (ref 65–99)
HCT VFR BLD AUTO: 44.9 % (ref 36.5–49.3)
HGB BLD-MCNC: 15.2 G/DL (ref 12–17)
LYMPHOCYTES # BLD AUTO: 4.51 THOUSANDS/ΜL (ref 0.6–4.47)
LYMPHOCYTES NFR BLD AUTO: 51 % (ref 14–44)
MCH RBC QN AUTO: 31.6 PG (ref 26.8–34.3)
MCHC RBC AUTO-ENTMCNC: 33.9 G/DL (ref 31.4–37.4)
MCV RBC AUTO: 93 FL (ref 82–98)
MONOCYTES # BLD AUTO: 0.78 THOUSAND/ΜL (ref 0.17–1.22)
MONOCYTES NFR BLD AUTO: 9 % (ref 4–12)
NEUTROPHILS # BLD AUTO: 3.22 THOUSANDS/ΜL (ref 1.85–7.62)
NEUTS SEG NFR BLD AUTO: 36 % (ref 43–75)
NRBC BLD AUTO-RTO: 0 /100 WBCS
PLATELET # BLD AUTO: 258 THOUSANDS/UL (ref 149–390)
PMV BLD AUTO: 10.7 FL (ref 8.9–12.7)
POTASSIUM SERPL-SCNC: 4.2 MMOL/L (ref 3.5–5.3)
PROT SERPL-MCNC: 8.2 G/DL (ref 6.4–8.2)
RBC # BLD AUTO: 4.81 MILLION/UL (ref 3.88–5.62)
SODIUM SERPL-SCNC: 141 MMOL/L (ref 136–145)
WBC # BLD AUTO: 8.91 THOUSAND/UL (ref 4.31–10.16)

## 2018-05-24 PROCEDURE — 76705 ECHO EXAM OF ABDOMEN: CPT

## 2018-05-24 PROCEDURE — 36415 COLL VENOUS BLD VENIPUNCTURE: CPT

## 2018-05-24 PROCEDURE — 85025 COMPLETE CBC W/AUTO DIFF WBC: CPT

## 2018-05-24 PROCEDURE — 82105 ALPHA-FETOPROTEIN SERUM: CPT

## 2018-05-24 PROCEDURE — 80053 COMPREHEN METABOLIC PANEL: CPT

## 2018-06-07 ENCOUNTER — OFFICE VISIT (OUTPATIENT)
Dept: FAMILY MEDICINE CLINIC | Facility: CLINIC | Age: 58
End: 2018-06-07
Payer: MEDICARE

## 2018-06-07 VITALS
DIASTOLIC BLOOD PRESSURE: 84 MMHG | WEIGHT: 200 LBS | HEIGHT: 73 IN | BODY MASS INDEX: 26.51 KG/M2 | SYSTOLIC BLOOD PRESSURE: 140 MMHG

## 2018-06-07 DIAGNOSIS — M54.41 ACUTE RIGHT-SIDED LOW BACK PAIN WITH RIGHT-SIDED SCIATICA: Primary | ICD-10-CM

## 2018-06-07 PROBLEM — R74.8 ELEVATED LIVER ENZYMES: Status: ACTIVE | Noted: 2017-08-02

## 2018-06-07 PROBLEM — E03.9 HYPOTHYROIDISM: Status: ACTIVE | Noted: 2017-02-21

## 2018-06-07 PROCEDURE — 99213 OFFICE O/P EST LOW 20 MIN: CPT | Performed by: FAMILY MEDICINE

## 2018-06-07 RX ORDER — NAPROXEN 500 MG/1
TABLET ORAL
Qty: 60 TABLET | Refills: 1 | Status: SHIPPED | OUTPATIENT
Start: 2018-06-07 | End: 2020-08-31
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
## 2018-06-07 NOTE — PATIENT INSTRUCTIONS
Sciatica   WHAT YOU NEED TO KNOW:   What is sciatica? Sciatica is a condition that causes pain along your sciatic nerve  The sciatic nerve runs from your spine through both sides of your buttocks  It then runs down the back of your thigh, into your lower leg and foot  Any place along your sciatic nerve may be compressed, inflamed, irritated, or stretched and cause symptoms  What causes sciatica? Sciatica may be related to certain activities, poor posture, and physical or psychological stress  Any of the following may cause or increase your risk of sciatica:  · Disc problems:  A slipped disc (soft cushion in between the bones of the spine) is the most common cause of sciatica  The disc may press on the sciatic nerve  One bone in your spine may slip over another, or you may have narrowing of the spinal column  · Muscle injury: This may happen after you twist or lift a heavy object  Swelling from sprained or irritated muscles in the buttocks, thighs, or legs press on the sciatic nerve  · Obesity or pregnancy:  Extra weight increases pressure on your back and legs  · Trauma:  Direct blows on the buttocks, thighs, or legs, car accidents, or falls may injure the sciatic nerve  · Diseases of the spine:  Arthritis, osteoporosis, cancer, or infection of the spine may also affect the sciatic nerve  What are the signs and symptoms of sciatica? The symptoms of sciatic may be short-term or long-term:  · Pain that goes from the lower back into your buttocks and down the back of your thigh    · Numbness or tingling in your buttocks and legs    · Muscle weakness, difficulty moving or controlling your leg or foot    · Leg pain that increases with standing, sitting, or squatting  How is sciatica diagnosed? Your healthcare provider will ask about other health conditions you may have  He may ask you about your job, history of back pain, diseases, or surgeries you have had   He will examine you and move your legs to see what increases pain  You may also need any of the following:  · X-rays: This is a picture of the bones and tissues in your back, hip, thigh, or leg  This test may show other problems, such as fractures (broken bones)  · CT scan: This test is also called a CAT scan  An x-ray machine uses a computer to take pictures of your hips, thighs, and legs  The pictures may show your sciatic nerve, muscles, and blood vessels  You may be given a dye before the pictures are taken to help healthcare providers see the pictures better  Tell the healthcare provider if you have ever had an allergic reaction to contrast dye  · MRI:  This scan uses powerful magnets and a computer to take pictures of your hips, thighs, and legs  An MRI may show damaged nerves, muscles, bones, and blood vessels  You may be given dye to help the pictures show up better  Tell the healthcare provider if you have ever had an allergic reaction to contrast dye  Do not enter the MRI room with anything metal  Metal can cause serious injury  Tell the healthcare provider if you have any metal in or on your body  · An electromyography (EMG)  test measures the electrical activity of your muscles at rest and with movement  · Nerve conduction tests: These tests check how surface nerves and related muscles respond to stimulation  Electrodes with wires or tiny needles are placed on certain areas, such as the buttocks and legs  How is sciatica treated? · NSAIDs:  These medicines decrease swelling and pain  NSAIDs are available without a doctor's order  Ask your healthcare provider which medicine is right for you  Ask how much to take and when to take it  Take as directed  NSAIDs can cause stomach bleeding or kidney problems if not taken correctly  · Acetaminophen: This medicine decreases pain  Acetaminophen is available without a doctor's order  Ask how much to take and how often to take it  Follow directions   Acetaminophen can cause liver damage if not taken correctly  · Muscle relaxers  help decrease pain and muscle spasms  · Epidural steroid medicine: This may include both an anesthetic (numbing medicine) and a steroid, which may decrease swelling and relieve pain  It is given as a shot close to the spine in the area where you have pain  · Chemonucleolysis: This is an injection given into the damaged disc to soften or shrink the disc  · Surgery: This may be done to correct problems such as a damaged disc, or a tumor in your spine  It may be done to decrease the pressure on the sciatic nerve  Healthcare providers may also release the muscle that may be pressing into your sciatic nerve  How can I help manage sciatica? · Ultrasound therapy: This is a machine that uses sound waves to decrease pain  Topical medicines may be added to help decrease pain and inflammation  · Physical therapy:  A physical therapist teaches you exercises to help improve movement and strength, and to decrease pain  An occupational therapist teaches you skills to help with your daily activities  · Assistive devices: You may need to wear back support, such as a back brace  You may need crutches, a cane, or a walker to decrease stress on your lower back and leg muscles  Ask your healthcare provider for more information about assistive devices and how to use them correctly  How can sciatica be prevented? · Avoid pressure on your back and legs:  Do not  lift heavy objects, or stand or sit for long periods of time  · Lift objects safely:  Keep your back straight and bend your knees when you  an object  Do not bend or twist your back when you lift  · Maintain a healthy weight:  Ask your healthcare provider how much you should weigh  Ask him to help you create a weight loss plan if you are overweight  · Exercise:  Ask your healthcare provider about the best stretching, warmup, and exercise plan for you    What are the risks of sciatica? An epidural steroid injection can lead to pain disorders or paralysis if it is placed incorrectly  It may also cause headaches, leg pain, and blockage of blood flow to the spinal cord  Surgery may cause you to bleed or get an infection  If not treated, your muscles and nerves may become damaged permanently  You may have decreased strength  You may not be able to move your leg or control when you urinate or have bowel movements  When should I contact my healthcare provider? · You have pain in your lower back at night or when resting  · You have pain in your lower back with numbness below the knee  · You have weakness in one leg only  · You have questions or concerns about your condition or care  When should I seek immediate care or call 911? · You have trouble holding back your urine or bowel movements  · You have weakness in both legs  · You have numbness in your groin or buttocks  CARE AGREEMENT:   You have the right to help plan your care  Learn about your health condition and how it may be treated  Discuss treatment options with your caregivers to decide what care you want to receive  You always have the right to refuse treatment  The above information is an  only  It is not intended as medical advice for individual conditions or treatments  Talk to your doctor, nurse or pharmacist before following any medical regimen to see if it is safe and effective for you  © 2017 [employer] Information is for End User's use only and may not be sold, redistributed or otherwise used for commercial purposes  All illustrations and images included in CareNotes® are the copyrighted property of [employer]  or [employer]

## 2018-06-11 ENCOUNTER — EVALUATION (OUTPATIENT)
Dept: PHYSICAL THERAPY | Facility: CLINIC | Age: 58
End: 2018-06-11
Payer: MEDICARE

## 2018-06-11 DIAGNOSIS — M54.41 ACUTE RIGHT-SIDED LOW BACK PAIN WITH RIGHT-SIDED SCIATICA: Primary | ICD-10-CM

## 2018-06-11 PROCEDURE — 97110 THERAPEUTIC EXERCISES: CPT | Performed by: PHYSICAL THERAPIST

## 2018-06-11 PROCEDURE — G8990 OTHER PT/OT CURRENT STATUS: HCPCS | Performed by: PHYSICAL THERAPIST

## 2018-06-11 PROCEDURE — 97161 PT EVAL LOW COMPLEX 20 MIN: CPT | Performed by: PHYSICAL THERAPIST

## 2018-06-11 PROCEDURE — G8991 OTHER PT/OT GOAL STATUS: HCPCS | Performed by: PHYSICAL THERAPIST

## 2018-06-11 NOTE — PROGRESS NOTES
PT Evaluation     Today's date: 2018  Patient name: Enrique Constantino  : 1960  MRN: [id number]  Referring provider: Rosa Polanco DO  Dx:   Encounter Diagnosis     ICD-10-CM    1  Acute right-sided low back pain with right-sided sciatica M54 41 Ambulatory referral to Physical Therapy                  Assessment  Impairments: abnormal coordination, abnormal or restricted ROM, activity intolerance, impaired physical strength and pain with function    Assessment details: Pt is a pleasant 61 y o  male presenting to outpatient physical therapy with Acute right-sided low back pain with right-sided sciatica  (primary encounter diagnosis)   Pt presents with pain, decreased range of motion, decreased strength, and decreased tolerance to activity  Displays probable movement impairment diagnosis of right quad hypomobility and right-sided lumbar hypomobility dysfunction  Pt would benefit from skilled physical therapy to address limitations and to achieve goals  Thank you for this referral    Understanding of Dx/Px/POC: good   Prognosis: good    Goals  ST  Patient will report 25% decrease in pain in 4 weeks  2  Patient will demonstrate 25% improvement in ROM in 4 weeks  3  Patient will demonstrate 1/2 grade improvement in strength in 4 weeks  LT  Patient will be able to perform IADLS without restriction or pain by discharge  2  Patient will be independent in HEP by discharge  3  Patient will be able to return to recreational/work duties without restriction or pain by discharge        Plan  Patient would benefit from: PT eval and skilled PT  Planned modality interventions: cryotherapy and thermotherapy: hydrocollator packs  Planned therapy interventions: IADL retraining, body mechanics training, flexibility, functional ROM exercises, home exercise program, neuromuscular re-education, manual therapy, postural training, strengthening, stretching, therapeutic activities and therapeutic exercise  Frequency: 2x week  Duration in visits: 8  Duration in weeks: 4        Subjective Evaluation    History of Present Illness  Mechanism of injury: Pt reports about 1 5 week history of right-sided low back pain of unknown etiology  Denies diagnostic imaging  Notes he was prescribed muscle relaxer and referred to PT  Notes symptom aggravators are walking periods of time, brushing his teeth, or when sitting on a hard chair  States his back pain has improved since starting medication, however, primary complaints are now pain in right thigh  Denies symptoms distal to knee  Denies paresthesias and denies left low back pain  Reports he feels limited with walking and stairs due to pain, and has been limited with going to the gym and the [employer] as a result  Regular follow up with physician scheduled for   Pain  Current pain ratin  At best pain ratin  At worst pain ratin  Location: anterior thigh proximal to thigh  Quality: tight      Diagnostic Tests  No diagnostic tests performed  Patient Goals  Patient goals for therapy: independence with ADLs/IADLs, return to sport/leisure activities, decreased pain and increased motion          Objective     Postural Observations    Additional Postural Observation Details  At IE : Stands with lateral shift to left    Palpation   Left   No palpable tenderness to the erector spinae, lumbar paraspinals and rectus abdominus  Hypertonic in the erector spinae and lumbar paraspinals  Right   No palpable tenderness to the erector spinae, lumbar paraspinals and rectus abdominus  Hypertonic in the erector spinae and lumbar paraspinals  Tenderness     Lumbar Spine  No tenderness in the spinous process and right transverse process  Left Hip   No tenderness in the ASIS and PSIS  Right Hip   No tenderness in the ASIS and PSIS       Neurological Testing     Reflexes   Left   Patellar (L4): brisk (3+)  Achilles (S1): normal (2+)  Clonus sign: negative    Right   Patellar (L4): normal (2+)  Achilles (S1): normal (2+)  Clonus sign: negative    Additional Neurological Details  LOWER EXTREMITY DERMATOMES: WNL, symmetrical, and intact L2-S2, except decreased sensation reported in right L5-S1  LOWER EXTREMITY MYOTOMES: WNL, symmetrical, and intact L1-S2       Active Range of Motion     Lumbar   Flexion: 80 degrees   Extension: 13 degrees   Left lateral flexion: 15 degrees   Right lateral flexion: 27 degrees     Additional Active Range of Motion Details  LUMBAR AROM -  Flexion, extension, bilat lateral flexion measured with bubble inclinometer at L1; Rotation measured with goniometer and patient seated  At IE 6/11: Denies pain in lumbar region with AROM testing    Passive Range of Motion     Additional Passive Range of Motion Details  At IE 6/11: Passive hip ROM bilat [facility]    Tests     Lumbar   Negative repeated flexion and repeated extension  Left   Negative crossed SLR, passive SLR and repeated side glide  Right   Positive passive SLR (hamstring hypomobility)  Negative crossed SLR and repeated side glide  Right Hip   Modified Kyle: Positive  SLR: Positive         Flowsheet Rows      Most Recent Value   PT/OT G-Codes   Current Score  54   Projected Score  71   Assessment Type  Evaluation   G code set  Other PT/OT Primary   Other PT Primary Current Status ()  CK   Other PT Primary Goal Status ()  CJ          Precautions: HTN, 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

## 2018-06-13 ENCOUNTER — OFFICE VISIT (OUTPATIENT)
Dept: PHYSICAL THERAPY | Facility: CLINIC | Age: 58
End: 2018-06-13
Payer: MEDICARE

## 2018-06-13 DIAGNOSIS — M54.41 ACUTE RIGHT-SIDED LOW BACK PAIN WITH RIGHT-SIDED SCIATICA: Primary | ICD-10-CM

## 2018-06-13 PROCEDURE — 97150 GROUP THERAPEUTIC PROCEDURES: CPT

## 2018-06-13 PROCEDURE — 97140 MANUAL THERAPY 1/> REGIONS: CPT

## 2018-06-15 ENCOUNTER — OFFICE VISIT (OUTPATIENT)
Dept: PHYSICAL THERAPY | Facility: CLINIC | Age: 58
End: 2018-06-15
Payer: MEDICARE

## 2018-06-15 DIAGNOSIS — M54.41 ACUTE RIGHT-SIDED LOW BACK PAIN WITH RIGHT-SIDED SCIATICA: Primary | ICD-10-CM

## 2018-06-15 PROCEDURE — 97110 THERAPEUTIC EXERCISES: CPT

## 2018-06-15 PROCEDURE — 97150 GROUP THERAPEUTIC PROCEDURES: CPT

## 2018-06-15 NOTE — PROGRESS NOTES
Daily Note     Today's date: 6/15/2018  Patient name: Selena Chakraborty  : 1960  MRN: [id number]  Referring provider: Jesse Barr DO  Dx:   Encounter Diagnosis     ICD-10-CM    1  Acute right-sided low back pain with right-sided sciatica M54 41                   Subjective: Pt reports with c/o radiating pain into R lateral thigh into foot pre-tx  He currently denied LBP  He noted increased pain in R LE/ knee after last visit and noted difficulty walking as well  Objective: See treatment diary below  Precautions: HTN, left-sided paralysis since     Daily Treatment Diary     Manual  6/11 6/13 6/15         Lumbar sidelying rotational mobs perf perf held         Passive quad stretch prone perf perf R only held         Passive hip flex stretch sidelying perf attempted - p! held                                     Exercise Diary  6/11 6/13 6/15         bike  5 min 5'         Lumbar pball roll outs  15"x5 15"x5         Seated hamstring str  15"x5 15"x5                     LTR 15"x5 15"x5 15"x5         SKTC  15"x5 15"x5         Piriformis str  15"x5 15"x5         H/S strap stretch  30"x3 30"x3                     Hip flexor stretch EOT 30"x3 30"x3 np         Prone quad stretch 30"x3 30"x3 np                                       Modalities                                                                 Assessment: Good tolerance with exercises today  Held EOT HF and prone quad stretches due to c/o pain last visit  Manuals deferred due to increased pain as well  Reassess nv re: lack of manual treatment  Plan: Continue per plan of care

## 2018-06-19 ENCOUNTER — OFFICE VISIT (OUTPATIENT)
Dept: PHYSICAL THERAPY | Facility: CLINIC | Age: 58
End: 2018-06-19
Payer: MEDICARE

## 2018-06-19 DIAGNOSIS — M54.41 ACUTE RIGHT-SIDED LOW BACK PAIN WITH RIGHT-SIDED SCIATICA: Primary | ICD-10-CM

## 2018-06-19 PROCEDURE — 97110 THERAPEUTIC EXERCISES: CPT | Performed by: PHYSICAL MEDICINE & REHABILITATION

## 2018-06-19 NOTE — PROGRESS NOTES
Daily Note     Today's date: 2018  Patient name: Roxana Ozuna  : 1960  MRN: [id number]  Referring provider: Concepcion Coyne DO  Dx:   Encounter Diagnosis     ICD-10-CM    1  Acute right-sided low back pain with right-sided sciatica M54 41                   Subjective: Patient presents without complaints of back pain or radicular sxs this session  Patient notes right knee pain which does not appear radicular in nature  Objective: See treatment diary below  Precautions: HTN, left-sided paralysis since     Daily Treatment Diary     Manual  6/11 6/13 6/15 6/19        Lumbar sidelying rotational mobs perf perf held NP        Passive quad stretch prone perf perf R only held NP        Passive hip flex stretch sidelying perf attempted - p! held NP                                    Exercise Diary  6/11 6/13 6/15 6/19        bike  5 min 5' 5'        Lumbar pball roll outs  15"x5 15"x5 5x15" ea        Seated hamstring str  15"x5 15"x5 3x20"                    LTR 15"x5 15"x5 15"x5 5x15" ea        SKTC  15"x5 15"x5 5x15" ea        Piriformis str  15"x5 15"x5 3x30"        H/S strap stretch  30"x3 30"x3 3x30"                    Hip flexor stretch EOT 30"x3 30"x3 np np        Prone quad stretch 30"x3 30"x3 np np                                    Modalities                                                                 Assessment: Patient demonstrates fair tolerance to TE as charted, defers additional TE and manual secondary to pain following previous sessions  Will continue per patient tolerance  Plan: Continue per plan of care

## 2018-06-21 ENCOUNTER — OFFICE VISIT (OUTPATIENT)
Dept: PHYSICAL THERAPY | Facility: CLINIC | Age: 58
End: 2018-06-21
Payer: MEDICARE

## 2018-06-21 DIAGNOSIS — M54.41 ACUTE RIGHT-SIDED LOW BACK PAIN WITH RIGHT-SIDED SCIATICA: Primary | ICD-10-CM

## 2018-06-21 PROCEDURE — G8990 OTHER PT/OT CURRENT STATUS: HCPCS

## 2018-06-21 PROCEDURE — G8991 OTHER PT/OT GOAL STATUS: HCPCS

## 2018-06-21 PROCEDURE — 97110 THERAPEUTIC EXERCISES: CPT

## 2018-06-21 NOTE — PROGRESS NOTES
Daily Note     Today's date: 2018  Patient name: Tova Russo  : 1960  MRN: [id number]  Referring provider: Alessia Kim DO  Dx:   Encounter Diagnosis     ICD-10-CM    1  Acute right-sided low back pain with right-sided sciatica M54 41                   Subjective: Patient currently denied LBP, but continues to c/o R knee pain with no radicular pattern  However, noted discomfort in R anterior thigh  He noted returning to MD on  and wishes to hold PT after today  Objective: See treatment diary below  Precautions: HTN, left-sided paralysis since     Daily Treatment Diary     Manual  6/11 6/13 6/15 6/19        Lumbar sidelying rotational mobs perf perf held NP        Passive quad stretch prone perf perf R only held NP        Passive hip flex stretch sidelying perf attempted - p! held NP                                    Exercise Diary  6/11 6/13 6/15 6/19 6/21       bike  5 min 5' 5' 5'       Lumbar pball roll outs  15"x5 15"x5 5x15" ea 15"x5       Seated hamstring str  15"x5 15"x5 3x20" 15"x5                   LTR 15"x5 15"x5 15"x5 5x15" ea 15"x5       SKTC  15"x5 15"x5 5x15" ea 15"x5       Piriformis str  15"x5 15"x5 3x30" 30"x3       H/S strap stretch  30"x3 30"x3 3x30" 30"x3                   Hip flexor stretch EOT 30"x3 30"x3 np np np       Prone quad stretch 30"x3 30"x3 np np np                                   Modalities                                                                 Assessment: Patient demonstrates improved tolerance with exercises  He reported pain freepost treatment  Plan: Continue per plan of care

## 2018-06-27 ENCOUNTER — OFFICE VISIT (OUTPATIENT)
Dept: FAMILY MEDICINE CLINIC | Facility: CLINIC | Age: 58
End: 2018-06-27
Payer: MEDICARE

## 2018-06-27 ENCOUNTER — ANESTHESIA EVENT (OUTPATIENT)
Dept: GASTROENTEROLOGY | Facility: HOSPITAL | Age: 58
End: 2018-06-27
Payer: MEDICARE

## 2018-06-27 VITALS
WEIGHT: 204 LBS | BODY MASS INDEX: 27.04 KG/M2 | TEMPERATURE: 96.4 F | SYSTOLIC BLOOD PRESSURE: 140 MMHG | HEIGHT: 73 IN | DIASTOLIC BLOOD PRESSURE: 80 MMHG

## 2018-06-27 DIAGNOSIS — K21.9 GERD WITHOUT ESOPHAGITIS: ICD-10-CM

## 2018-06-27 DIAGNOSIS — I10 ESSENTIAL HYPERTENSION: Primary | ICD-10-CM

## 2018-06-27 DIAGNOSIS — E78.2 MIXED HYPERLIPIDEMIA: ICD-10-CM

## 2018-06-27 DIAGNOSIS — K74.00 LIVER FIBROSIS: ICD-10-CM

## 2018-06-27 DIAGNOSIS — E03.9 ACQUIRED HYPOTHYROIDISM: ICD-10-CM

## 2018-06-27 PROCEDURE — 99214 OFFICE O/P EST MOD 30 MIN: CPT | Performed by: FAMILY MEDICINE

## 2018-06-27 NOTE — PROGRESS NOTES
Assessment/Plan:    GERD without esophagitis  Patient to have EGD done Patient to continue with current meds and see GI and hve th EGD done    Liver fibrosis  Patient to continue to abstain from alcohol Patient will continue current care    Acquired hypothyroidism  Thyroid stable on current meds recheck albs in September    Essential hypertension  Stable blood pressure continue current meds    Traumatic brain injury ([facility] [address] )  Continue current care    Mixed hyperlipidemia  Continue with atrovastatin and check labs       Diagnoses and all orders for this visit:    Essential hypertension    Acquired hypothyroidism    GERD without esophagitis    Mixed hyperlipidemia    Liver fibrosis          Subjective:   Chief Complaint   Patient presents with    Hypertension     checkup    Hypothyroidism     checkup        Patient ID: Cabrera Posadas is a 61 y o  male  Patient ishere for followup of hypertension, hyperlipidemia, hypothyroidism  liver fibrosis, GERD, traumatic brain injury patient blood pressure is stable Patient lipids are also stable however in 2/2018 the triglycerides were elevated However that was before he quit drinking alcohol in March patient is following with GI and in fact has EGD for tomorrow His reflux is stable and in fact less often sicne he quit drinking Patient mental status and coordination is stable since his traumatic brain injury in the late 1970's patient has no complaints at this time      Hypertension   This is a chronic problem  The current episode started more than 1 year ago  The problem has been gradually improving since onset  The problem is controlled  Associated symptoms include peripheral edema  Pertinent negatives include no anxiety, blurred vision, chest pain, headaches, malaise/fatigue, neck pain, orthopnea, palpitations, PND, shortness of breath or sweats  There are no associated agents to hypertension   Risk factors for coronary artery disease include dyslipidemia and male gender  Past treatments include diuretics and calcium channel blockers  The current treatment provides significant improvement  There are no compliance problems  There is no history of angina, kidney disease, CAD/MI, CVA, heart failure, left ventricular hypertrophy, PVD or retinopathy  There is no history of chronic renal disease, coarctation of the aorta, hyperaldosteronism, hypercortisolism, hyperparathyroidism, a hypertension causing med, pheochromocytoma, renovascular disease or sleep apnea  The following portions of the patient's history were reviewed and updated as appropriate: allergies, current medications, past social history and problem list     Review of Systems   Constitutional: Negative for fatigue, fever, malaise/fatigue and unexpected weight change  HENT: Negative for congestion, sinus pain and trouble swallowing  Eyes: Negative for blurred vision, discharge and visual disturbance  Respiratory: Negative for cough, chest tightness, shortness of breath and wheezing  Cardiovascular: Negative for chest pain, palpitations, orthopnea, leg swelling and PND  Gastrointestinal: Negative for abdominal pain, blood in stool, constipation, diarrhea, nausea and vomiting  Genitourinary: Negative for difficulty urinating, dysuria, frequency and hematuria  Musculoskeletal: Negative for arthralgias, gait problem, joint swelling and neck pain  Skin: Negative for rash and wound  Allergic/Immunologic: Negative for environmental allergies and food allergies  Neurological: Negative for dizziness, syncope, weakness, numbness and headaches  Hematological: Negative for adenopathy  Does not bruise/bleed easily  Psychiatric/Behavioral: Negative for confusion, decreased concentration and sleep disturbance  The patient is not nervous/anxious            Objective:      /80   Temp (!) 96 4 °F (35 8 °C)   Ht 6' 1" (1 854 m)   Wt 92 5 kg (204 lb)   BMI 26 91 kg/m²          Physical Exam Constitutional: He is oriented to person, place, and time  He appears well-developed and well-nourished  HENT:   Head: Normocephalic and atraumatic  Right Ear: Hearing, tympanic membrane and external ear normal    Left Ear: Hearing, tympanic membrane and external ear normal    Eyes: Conjunctivae and EOM are normal  Pupils are equal, round, and reactive to light  Neck: Neck supple  No thyromegaly present  Cardiovascular: Normal rate and normal heart sounds  Pulmonary/Chest: Effort normal and breath sounds normal  He has no wheezes  He has no rales  Abdominal: Soft  Bowel sounds are normal  He exhibits no distension  There is no tenderness  Musculoskeletal: He exhibits no edema or tenderness  Lymphadenopathy:     He has no cervical adenopathy  Neurological: He is alert and oriented to person, place, and time  No cranial nerve deficit  Coordination normal    Skin: Skin is warm and dry  No rash noted  Psychiatric: He has a normal mood and affect   His behavior is normal  Judgment and thought content normal

## 2018-06-27 NOTE — PATIENT INSTRUCTIONS

## 2018-06-28 ENCOUNTER — ANESTHESIA (OUTPATIENT)
Dept: GASTROENTEROLOGY | Facility: HOSPITAL | Age: 58
End: 2018-06-28
Payer: MEDICARE

## 2018-06-28 ENCOUNTER — HOSPITAL ENCOUNTER (OUTPATIENT)
Facility: HOSPITAL | Age: 58
Setting detail: OUTPATIENT SURGERY
Discharge: HOME | End: 2018-06-28
Attending: INTERNAL MEDICINE | Admitting: INTERNAL MEDICINE
Payer: MEDICARE

## 2018-06-28 VITALS
HEIGHT: 73 IN | DIASTOLIC BLOOD PRESSURE: 80 MMHG | RESPIRATION RATE: 18 BRPM | TEMPERATURE: 98.8 F | OXYGEN SATURATION: 98 % | SYSTOLIC BLOOD PRESSURE: 142 MMHG | WEIGHT: 204 LBS | BODY MASS INDEX: 27.04 KG/M2 | HEART RATE: 78 BPM

## 2018-06-28 DIAGNOSIS — K74.00 LIVER FIBROSIS: ICD-10-CM

## 2018-06-28 PROCEDURE — 88305 TISSUE EXAM BY PATHOLOGIST: CPT | Performed by: PATHOLOGY

## 2018-06-28 PROCEDURE — 88342 IMHCHEM/IMCYTCHM 1ST ANTB: CPT | Performed by: PATHOLOGY

## 2018-06-28 PROCEDURE — 43239 EGD BIOPSY SINGLE/MULTIPLE: CPT | Performed by: INTERNAL MEDICINE

## 2018-06-28 RX ORDER — PROPOFOL 10 MG/ML
INJECTION, EMULSION INTRAVENOUS AS NEEDED
Status: DISCONTINUED | OUTPATIENT
Start: 2018-06-28 | End: 2018-06-28 | Stop reason: SURG

## 2018-06-28 RX ORDER — SODIUM CHLORIDE 9 MG/ML
125 INJECTION, SOLUTION INTRAVENOUS CONTINUOUS
Status: DISCONTINUED | OUTPATIENT
Start: 2018-06-28 | End: 2018-06-28 | Stop reason: HOSPADM

## 2018-06-28 RX ADMIN — SODIUM CHLORIDE 125 ML/HR: 0.9 INJECTION, SOLUTION INTRAVENOUS at 13:30

## 2018-06-28 RX ADMIN — LIDOCAINE HYDROCHLORIDE 100 MG: 20 INJECTION, SOLUTION INTRAVENOUS at 13:49

## 2018-06-28 RX ADMIN — PROPOFOL 130 MG: 10 INJECTION, EMULSION INTRAVENOUS at 13:49

## 2018-06-28 RX ADMIN — PROPOFOL 30 MG: 10 INJECTION, EMULSION INTRAVENOUS at 13:51

## 2018-06-28 RX ADMIN — PROPOFOL 20 MG: 10 INJECTION, EMULSION INTRAVENOUS at 13:54

## 2018-06-28 NOTE — PROGRESS NOTES
D/C instructions reviewed w/ pt, verbalized understanding  Denies any swallowing difficulties or pain  Occasional moist non productive cough  Tolerating fluids

## 2018-06-28 NOTE — DISCHARGE INSTRUCTIONS
Gastritis   WHAT YOU NEED TO KNOW:   Gastritis is inflammation or irritation of the lining of your stomach  DISCHARGE INSTRUCTIONS:   Call 911 for any of the following:   · You develop chest pain or shortness of breath  Seek care immediately if:   · You vomit blood  · You have black or bloody bowel movements  · You have severe stomach or back pain  Contact your healthcare provider if:   · You have a fever  · You have new or worsening symptoms, even after treatment  · You have questions or concerns about your condition or care  Medicines:   · Medicines  may be given to help treat a bacterial infection or decrease stomach acid  · Take your medicine as directed  Contact your healthcare provider if you think your medicine is not helping or if you have side effects  Tell him or her if you are allergic to any medicine  Keep a list of the medicines, vitamins, and herbs you take  Include the amounts, and when and why you take them  Bring the list or the pill bottles to follow-up visits  Carry your medicine list with you in case of an emergency  Manage or prevent gastritis:   · Do not smoke  Nicotine and other chemicals in cigarettes and cigars can make your symptoms worse and cause lung damage  Ask your healthcare provider for information if you currently smoke and need help to quit  E-cigarettes or smokeless tobacco still contain nicotine  Talk to your healthcare provider before you use these products  · Do not drink alcohol  Alcohol can prevent healing and make your gastritis worse  Talk to your healthcare provider if you need help to stop drinking  · Do not take NSAIDs or aspirin unless directed  These and similar medicines can cause irritation  If your healthcare provider says it is okay to take NSAIDs, take them with food  · Do not eat foods that cause irritation  Foods such as oranges and salsa can cause burning or pain  Eat a variety of healthy foods   Examples include fruits (not citrus), vegetables, low-fat dairy products, beans, whole-grain breads, and lean meats and fish  Try to eat small meals, and drink water with your meals  Do not eat for at least 3 hours before you go to bed  · Find ways to relax and decrease stress  Stress can increase stomach acid and make gastritis worse  Activities such as yoga, meditation, or listening to music can help you relax  Spend time with friends, or do things you enjoy  Follow up with your healthcare provider as directed: You may need ongoing tests or treatment, or referral to a gastroenterologist  Write down your questions so you remember to ask them during your visits  © 2017 [facility] Information is for End User's use only and may not be sold, redistributed or otherwise used for commercial purposes  All illustrations and images included in CareNotes® are the copyrighted property of A D A M , Inc  or Robby Pagan  The above information is an  only  It is not intended as medical advice for individual conditions or treatments  Talk to your doctor, nurse or pharmacist before following any medical regimen to see if it is safe and effective for you  Esophagitis   WHAT YOU NEED TO KNOW:   Esophagitis is inflammation or irritation of the lining of the esophagus  DISCHARGE INSTRUCTIONS:   Call 911 for any of the following:   · You have chest pain that does not go away within a few minutes or gets worse  Seek care immediately if:   · You feel like you have food stuck in your throat and you cannot cough it out  Contact your healthcare provider if:   · You have new or worsening symptoms, even after treatment  · You have questions or concerns about your condition or care  Medicines:   · Medicines  may be given to fight an infection or to control stomach acid  · Take your medicine as directed    Contact your healthcare provider if you think your medicine is not helping or if you have side effects  Tell him or her if you are allergic to any medicine  Keep a list of the medicines, vitamins, and herbs you take  Include the amounts, and when and why you take them  Bring the list or the pill bottles to follow-up visits  Carry your medicine list with you in case of an emergency  Follow up with your healthcare provider as directed: You may need ongoing tests or treatment  Write down your questions so you remember to ask them during your visits  Do not smoke:  Nicotine and other chemicals in cigarettes and cigars can cause blood vessel and lung damage  Ask your healthcare provider for information if you currently smoke and need help to quit  E-cigarettes or smokeless tobacco still contain nicotine  Talk to your healthcare provider before you use these products  Do not drink alcohol:  Alcohol can irritate your esophagus  Talk to your healthcare provider if you need help to stop drinking  Keep batteries and similar objects out of the reach of children:  Babies often put items in their mouths to explore them  Button batteries are easy to swallow and can cause serious damage  Keep the battery covers of electronic devices such as remote controls taped closed  Store all batteries and toxic materials where children cannot get to them  Use childproof locks to keep children away from dangerous materials  Manage or prevent esophagitis:   · Limit or do not eat foods that can lead to esophagitis  Foods such as oranges and salsa can irritate your esophagus  Caffeine and chocolate can cause acid reflux  High-fat and fried foods make your stomach digest food more slowly  This increases the amount of stomach acid your esophagus is exposed to  Eat small meals, and drink water with your meals  Soft foods such as yogurt and applesauce may help soothe your throat  Do not eat for at least 3 hours before you go to bed  · Prevent acid reflux  Do not bend over unless it is necessary   Acid may back up into your esophagus when you bend over  If possible, keep the head of your bed elevated while you sleep  This will help keep acid from backing up  Manage stress  Stress can make your symptoms worse or cause stomach acid to back up  · Drink more liquid when you take pills  Drink a full glass of water when you take your pills  Ask your healthcare provider if you can take your pills at least an hour before you go to bed  © 2017 [address] Information is for End User's use only and may not be sold, redistributed or otherwise used for commercial purposes  All illustrations and images included in CareNotes® are the copyrighted property of A D A M , Inc  or Robby Latasha  The above information is an  only  It is not intended as medical advice for individual conditions or treatments  Talk to your doctor, nurse or pharmacist before following any medical regimen to see if it is safe and effective for you  Upper Endoscopy   WHAT YOU NEED TO KNOW:   An upper endoscopy is also called an upper gastrointestinal (GI) endoscopy, or an esophagogastroduodenoscopy (EGD)  You may feel bloated, gassy, or have some abdominal discomfort after your procedure  Your throat may be sore for 24 to 36 hours  You may burp or pass gas from air that is still inside your body  DISCHARGE INSTRUCTIONS:   Call 911 for any of the following:   · You have sudden chest pain or trouble breathing  Seek care immediately if:   · You feel dizzy or faint  · You have trouble swallowing  · Your bowel movements are very dark or black  · Your abdomen is hard and firm and you have severe pain  · You vomit blood  Contact your healthcare provider if:   · You feel full or bloated and cannot burp or pass gas  · You have not had a bowel movement for 3 days after your procedure  · You have neck pain  · You have a fever or chills  · You have nausea or are vomiting      · You have a rash or hives     · You have questions or concerns about your endoscopy  Relieve a sore throat:  Suck on throat lozenges or crushed ice  Gargle with a small amount of warm salt water  Mix 1 teaspoon of salt and 1 cup of warm water to make salt water  Relieve gas and discomfort from bloating:  Lie on your right side with a heating pad on your abdomen  Take short walks to help pass gas  Eat small meals until bloating is relieved  Rest after your procedure: You have been given medicine to relax you  Do not  drive or make important decisions until the day after your procedure  Return to your normal activity as directed  You can usually return to work the day after your procedure  Follow up with your healthcare provider as directed:  Write down your questions so you remember to ask them during your visits  © 2017 [address] is for End User's use only and may not be sold, redistributed or otherwise used for commercial purposes  All illustrations and images included in CareNotes® are the copyrighted property of A D A M , Inc  or Robby Pagan  The above information is an  only  It is not intended as medical advice for individual conditions or treatments  Talk to your doctor, nurse or pharmacist before following any medical regimen to see if it is safe and effective for you

## 2018-06-28 NOTE — ANESTHESIA POSTPROCEDURE EVALUATION
Post-Op Assessment Note      CV Status:  Stable    Mental Status:  Alert and awake    Hydration Status:  Euvolemic    PONV Controlled:  Controlled    Airway Patency:  Patent    Post Op Vitals Reviewed: Yes          Staff: Anesthesiologist, CRNA           /80 (06/28/18 1420)    Temp      Pulse 78 (06/28/18 1420)   Resp 18 (06/28/18 1420)    SpO2
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
## 2018-06-28 NOTE — OP NOTE
ESOPHAGOGASTRODUODENOSCOPY    PROCEDURE: EGD/ Polypectomy (Cold Biopsy)    INDICATIONS: GERD    POST-OP DIAGNOSIS: See the impression below    SEDATION: Monitored anesthesia care, check anesthesia records    PHYSICAL EXAM:    Vitals:    06/28/18 1314   BP: 149/86   Pulse: 66   Resp: 16   Temp: 98 8 °F (37 1 °C)   SpO2: 95%    Body mass index is 26 91 kg/m²  General: NAD  Heart: S1 & S2 normal, RRR  Lungs: CTA, No rales or rhonchi  Abdomen: Soft, nontender, nondistended, good bowel sounds    CONSENT:  Informed consent was obtained for the procedure, including sedation after explaining the risks and benefits of the procedure  Risks including but not limited to bleeding, perforation, infection, aspiration were discussed in detail  Also explained about less than 100% sensitivity with the exam and other alternatives  PREPARATION:   EKG tracing, pulse oximetry, blood pressure were monitored throughout the procedure  Patient was identified by myself both verbally and by visual inspection of ID band  DESCRIPTION:   Patient was placed in the left lateral decubitus position and was sedated with the above medication  The gastroscope was introduced in to the oropharynx and the esophagus was intubated under direct visualization  Scope was passed down the esophagus up to 2nd part of the duodenum  A careful inspection was made as the gastroscope was withdrawn, including a retroflexed view of the stomach; findings and interventions are described below  FINDINGS:    #1  Esophagus and GEJ- Z-line at 42 cm  Slightly irregular Z line  No signs of harvey's esophagus  Very mild esophagitis  The area was biopsied with cold forceps biopsies  #2  Stomach- Granulated mucosa  A small polyp measuring 1 cm found in the antrum  Polypectomy was done using cold forceps biopsies  #3  Duodenum- Normal           IMPRESSIONS:      Esophagitis  Gastric polyp removed  RECOMMENDATIONS:     - Follow up biopsy result     - follow up in office  COMPLICATIONS:  None; patient tolerated the procedure well            DISPOSITION: PACU           CONDITION: Stable

## 2018-06-28 NOTE — ANESTHESIA PREPROCEDURE EVALUATION
Review of Systems/Medical History  Patient summary reviewed  Chart reviewed      Cardiovascular  Hyperlipidemia, Hypertension controlled, DVT   Pulmonary  COPD ,        GI/Hepatic    GERD well controlled, Liver disease (fibrosis) ,   Comment: HCP     Negative  ROS        Endo/Other  History of thyroid disease , hypothyroidism,   Comment: PAST HX EtOH ABUSE (Resolved 6/5/2017)    GYN       Hematology  Negative hematology ROS      Musculoskeletal  Negative musculoskeletal ROS        Neurology  Negative neurology ROS     Comment: TBI Psychology   Negative psychology ROS              Physical Exam    Airway    Mallampati score: II  TM Distance: >3 FB  Neck ROM: full     Dental   No notable dental hx     Cardiovascular  Rhythm: regular, Rate: normal, Cardiovascular exam normal    Pulmonary  Pulmonary exam normal Breath sounds clear to auscultation,     Other Findings        Anesthesia Plan  ASA Score- 3     Anesthesia Type- IV sedation with anesthesia with ASA Monitors  Additional Monitors:   Airway Plan:         Plan Factors-Patient not instructed to abstain from smoking on day of procedure  Patient did not smoke on day of surgery  Induction- intravenous  Postoperative Plan-     Informed Consent- Anesthetic plan and risks discussed with patient  I personally reviewed this patient with the CRNA  Discussed and agreed on the Anesthesia Plan with the CRNA  Giuliano Bailey

## 2018-07-10 DIAGNOSIS — E78.2 MIXED HYPERLIPIDEMIA: Primary | ICD-10-CM

## 2018-07-10 DIAGNOSIS — E87.6 HYPOKALEMIA: ICD-10-CM

## 2018-07-10 RX ORDER — POTASSIUM CHLORIDE 750 MG/1
10 CAPSULE, EXTENDED RELEASE ORAL DAILY
Qty: 90 CAPSULE | Refills: 1 | Status: SHIPPED | OUTPATIENT
Start: 2018-07-10 | End: 2018-10-09 | Stop reason: SDUPTHER

## 2018-07-10 RX ORDER — ATORVASTATIN CALCIUM 40 MG/1
40 TABLET, FILM COATED ORAL DAILY
Qty: 90 TABLET | Refills: 1 | Status: SHIPPED | OUTPATIENT
Start: 2018-07-10 | End: 2018-10-09 | Stop reason: SDUPTHER

## 2018-07-13 ENCOUNTER — TELEPHONE (OUTPATIENT)
Dept: GASTROENTEROLOGY | Facility: MEDICAL CENTER | Age: 58
End: 2018-07-13

## 2018-07-13 DIAGNOSIS — A04.8 H. PYLORI INFECTION: Primary | ICD-10-CM

## 2018-07-13 NOTE — TELEPHONE ENCOUNTER
Dr Law Loo pt  Giuliano Nobles [facility] called stating the pylera is not covered by pt's insurance

## 2018-07-16 DIAGNOSIS — A04.8 H. PYLORI INFECTION: ICD-10-CM

## 2018-07-16 DIAGNOSIS — E78.2 MIXED HYPERLIPIDEMIA: ICD-10-CM

## 2018-07-16 NOTE — TELEPHONE ENCOUNTER
Entered recall to set up f/u appointment once schedule opens up (none of the PA's schedules are available)  Do you want to put in an H  Pylori lab for patient once med is finished?

## 2018-07-19 NOTE — TELEPHONE ENCOUNTER
Called pt and made him aware of lab- advised him to get this done 2-3 weeks after finishing Pylera  - also mailed lab script to patient  He is scheduled 9/18

## 2018-07-19 NOTE — TELEPHONE ENCOUNTER
I send the order for h  Pylori stool antigen  Please inform patient  His follow up appointment is not urgent  Let me know when you can schedule him  thanks

## 2018-08-23 ENCOUNTER — APPOINTMENT (OUTPATIENT)
Dept: LAB | Facility: HOSPITAL | Age: 58
End: 2018-08-23
Attending: INTERNAL MEDICINE
Payer: MEDICARE

## 2018-08-23 DIAGNOSIS — A04.8 H. PYLORI INFECTION: ICD-10-CM

## 2018-08-23 PROCEDURE — 87338 HPYLORI STOOL AG IA: CPT

## 2018-08-24 LAB — H PYLORI AG STL QL IA: NEGATIVE

## 2018-09-14 ENCOUNTER — IMMUNIZATION (OUTPATIENT)
Dept: FAMILY MEDICINE CLINIC | Facility: CLINIC | Age: 58
End: 2018-09-14
Payer: MEDICARE

## 2018-09-14 DIAGNOSIS — Z23 NEED FOR INFLUENZA VACCINATION: Primary | ICD-10-CM

## 2018-09-14 PROCEDURE — 90682 RIV4 VACC RECOMBINANT DNA IM: CPT | Performed by: FAMILY MEDICINE

## 2018-09-14 PROCEDURE — 90471 IMMUNIZATION ADMIN: CPT | Performed by: FAMILY MEDICINE

## 2018-09-14 NOTE — PROGRESS NOTES
Assessment/Plan:      There are no diagnoses linked to this encounter  Subjective:  Chief Complaint   Patient presents with    Injections     FluBlok 0 5 ml IM-left deltoid        Patient ID: Markel Cruz is a 60 y o  male      HPI    Review of Systems      Objective:     Physical Exam

## 2018-09-18 ENCOUNTER — OFFICE VISIT (OUTPATIENT)
Dept: GASTROENTEROLOGY | Facility: MEDICAL CENTER | Age: 58
End: 2018-09-18
Payer: MEDICARE

## 2018-09-18 VITALS
TEMPERATURE: 99.3 F | HEIGHT: 73 IN | HEART RATE: 94 BPM | WEIGHT: 197 LBS | DIASTOLIC BLOOD PRESSURE: 86 MMHG | BODY MASS INDEX: 26.11 KG/M2 | SYSTOLIC BLOOD PRESSURE: 138 MMHG

## 2018-09-18 DIAGNOSIS — K74.00 LIVER FIBROSIS: Primary | ICD-10-CM

## 2018-09-18 PROCEDURE — 99214 OFFICE O/P EST MOD 30 MIN: CPT | Performed by: INTERNAL MEDICINE

## 2018-09-18 NOTE — PATIENT INSTRUCTIONS
The lab and Ultrasound scripts were mailed out to the patient; he will call to schedule and have done in December, He will follow up in the office with Natalie Luna
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
## 2018-10-09 DIAGNOSIS — E87.6 HYPOKALEMIA: ICD-10-CM

## 2018-10-09 DIAGNOSIS — E78.2 MIXED HYPERLIPIDEMIA: ICD-10-CM

## 2018-10-09 RX ORDER — ATORVASTATIN CALCIUM 40 MG/1
40 TABLET, FILM COATED ORAL DAILY
Qty: 90 TABLET | Refills: 0 | Status: SHIPPED | OUTPATIENT
Start: 2018-10-09 | End: 2019-01-03 | Stop reason: SDUPTHER

## 2018-10-09 RX ORDER — POTASSIUM CHLORIDE 750 MG/1
10 CAPSULE, EXTENDED RELEASE ORAL DAILY
Qty: 90 CAPSULE | Refills: 0 | Status: SHIPPED | OUTPATIENT
Start: 2018-10-09 | End: 2019-01-08 | Stop reason: SDUPTHER

## 2018-10-22 DIAGNOSIS — I10 ESSENTIAL HYPERTENSION: ICD-10-CM

## 2018-10-22 DIAGNOSIS — K21.9 GASTROESOPHAGEAL REFLUX DISEASE WITHOUT ESOPHAGITIS: ICD-10-CM

## 2018-10-22 DIAGNOSIS — E03.9 ACQUIRED HYPOTHYROIDISM: ICD-10-CM

## 2018-10-22 RX ORDER — AMLODIPINE BESYLATE 5 MG/1
5 TABLET ORAL 2 TIMES DAILY
Qty: 180 TABLET | Refills: 0 | Status: SHIPPED | OUTPATIENT
Start: 2018-10-22 | End: 2019-02-18 | Stop reason: SDUPTHER

## 2018-10-22 RX ORDER — LEVOTHYROXINE SODIUM 0.05 MG/1
50 TABLET ORAL DAILY
Qty: 90 TABLET | Refills: 0 | Status: SHIPPED | OUTPATIENT
Start: 2018-10-22 | End: 2019-08-28

## 2018-10-22 RX ORDER — FAMOTIDINE 20 MG/1
20 TABLET, FILM COATED ORAL 2 TIMES DAILY
Qty: 180 TABLET | Refills: 0 | Status: SHIPPED | OUTPATIENT
Start: 2018-10-22 | End: 2019-01-29 | Stop reason: SDUPTHER

## 2018-10-24 ENCOUNTER — OFFICE VISIT (OUTPATIENT)
Dept: FAMILY MEDICINE CLINIC | Facility: CLINIC | Age: 58
End: 2018-10-24
Payer: MEDICARE

## 2018-10-24 VITALS
WEIGHT: 198.2 LBS | HEIGHT: 73 IN | DIASTOLIC BLOOD PRESSURE: 84 MMHG | SYSTOLIC BLOOD PRESSURE: 142 MMHG | BODY MASS INDEX: 26.27 KG/M2

## 2018-10-24 DIAGNOSIS — I10 ESSENTIAL HYPERTENSION: Primary | ICD-10-CM

## 2018-10-24 DIAGNOSIS — E03.9 ACQUIRED HYPOTHYROIDISM: ICD-10-CM

## 2018-10-24 DIAGNOSIS — R74.8 ELEVATED LIVER ENZYMES: ICD-10-CM

## 2018-10-24 DIAGNOSIS — K74.00 LIVER FIBROSIS: ICD-10-CM

## 2018-10-24 DIAGNOSIS — K21.9 GERD WITHOUT ESOPHAGITIS: ICD-10-CM

## 2018-10-24 DIAGNOSIS — E78.2 MIXED HYPERLIPIDEMIA: ICD-10-CM

## 2018-10-24 PROBLEM — M54.41 ACUTE RIGHT-SIDED LOW BACK PAIN WITH RIGHT-SIDED SCIATICA: Status: RESOLVED | Noted: 2018-06-07 | Resolved: 2018-10-24

## 2018-10-24 PROCEDURE — 99214 OFFICE O/P EST MOD 30 MIN: CPT | Performed by: FAMILY MEDICINE

## 2018-10-24 NOTE — ASSESSMENT & PLAN NOTE
Reflux is well controlled with the famotidine Patient will continue with that and see GI as scheduled

## 2018-10-24 NOTE — PROGRESS NOTES
Assessment/Plan:    GERD without esophagitis  Reflux is well controlled with the famotidine Patient will continue with that and see GI as scheduled    Liver fibrosis  Patient continues to abstain from alcohol Patient will have the repeat US as schedule in 12/2018 and see the GI doctors    Acquired hypothyroidism  Continue current dose of thryoid medicaiton Patient to have repeat albs done and see me in 6 months    Essential hypertension  Blood pressure continues to be stable Patien tshould continue current meds and see me in 6 months    Elevated liver enzymes  Liver enzymes were stable last labs Patient due for repeat and follouwp with GI    Mixed hyperlipidemia  Last lipids at goal Continue same meds and repeat labs in 12/2018 He will see me in 6 months       Diagnoses and all orders for this visit:    Essential hypertension    Mixed hyperlipidemia    Liver fibrosis    Elevated liver enzymes    Acquired hypothyroidism    GERD without esophagitis          Subjective:   Chief Complaint   Patient presents with    Hyperlipidemia    Hypertension    Hypothyroidism    GERD    COPD     no refills needed           Patient ID: Jaxson Flynn is a 60 y o  male      Patient is here for follwoup of hypertension, hyperlipidemia, hypothyroidism, GERD and liver fibrosis patient is doing well He is not smoking and abstains froom all alcohol Patient is trying to stay active also His last albs were reivweed He is due for rpeat labs and will have them done He is tolerating all his medications Patient last GI note and the US and liver biopsy were reviweed whti him and thing are stable Patient has no reflux ont he famotidine patient is overall feeling well and has no complaints         The following portions of the patient's history were reviewed and updated as appropriate: allergies, current medications, past social history and problem list     Review of Systems   Constitutional: Negative for activity change, appetite change, chills, fatigue and fever  HENT: Negative for congestion, dental problem, ear pain, hearing loss, postnasal drip, sinus pain, sore throat and voice change  Eyes: Negative for pain, discharge, itching and visual disturbance  Respiratory: Negative for cough, shortness of breath and wheezing  Cardiovascular: Negative for chest pain, palpitations and leg swelling  Gastrointestinal: Negative for abdominal distention, abdominal pain, blood in stool, constipation, diarrhea, nausea and vomiting  Endocrine: Negative for cold intolerance, polydipsia, polyphagia and polyuria  Genitourinary: Negative for decreased urine volume, difficulty urinating, discharge, dysuria, hematuria, penile pain, scrotal swelling and testicular pain  Musculoskeletal: Negative for arthralgias, back pain, gait problem and myalgias  Skin: Negative for rash  Allergic/Immunologic: Negative for environmental allergies, food allergies and immunocompromised state  Neurological: Negative for dizziness, seizures and headaches  Hematological: Negative for adenopathy  Psychiatric/Behavioral: Negative for agitation, confusion and sleep disturbance  The patient is not nervous/anxious  Objective:      /84   Ht 6' 1" (1 854 m)   Wt 89 9 kg (198 lb 3 2 oz)   BMI 26 15 kg/m²          Physical Exam   Constitutional: He is oriented to person, place, and time  He appears well-developed and well-nourished  HENT:   Head: Normocephalic and atraumatic  Right Ear: Hearing, tympanic membrane, external ear and ear canal normal    Left Ear: Hearing, tympanic membrane, external ear and ear canal normal    Eyes: Pupils are equal, round, and reactive to light  Conjunctivae and EOM are normal  Right eye exhibits no discharge  Left eye exhibits no discharge  No scleral icterus  Neck: Normal range of motion  Neck supple  No tracheal deviation present  No thyromegaly present     Cardiovascular: Normal rate, regular rhythm, normal heart sounds and intact distal pulses  Pulmonary/Chest: Effort normal and breath sounds normal    Abdominal: Soft  Bowel sounds are normal  He exhibits no distension and no mass  There is no tenderness  There is no rebound and no guarding  Hernia confirmed negative in the right inguinal area and confirmed negative in the left inguinal area  Genitourinary: Testes normal    Musculoskeletal: Normal range of motion  Lymphadenopathy:     He has no cervical adenopathy  Neurological: He is alert and oriented to person, place, and time  He has normal reflexes  No cranial nerve deficit  Skin: Skin is warm  No rash noted  Psychiatric: He has a normal mood and affect   Judgment and thought content normal

## 2018-10-24 NOTE — ASSESSMENT & PLAN NOTE
Patient continues to abstain from alcohol Patient will have the repeat US as schedule in 12/2018 and see the GI doctors

## 2018-10-24 NOTE — PATIENT INSTRUCTIONS
Cirrhosis   WHAT YOU NEED TO KNOW:   What is cirrhosis? Cirrhosis is long-term scarring of the liver  The liver makes enzymes and bile that help digest food and gives your body energy  It also removes harmful material from your body, such as alcohol and other chemicals  Cirrhosis is caused by repeated damage to your liver over time  Scar tissue starts to replace healthy liver tissue  The scar tissue prevents the liver from working properly  What increases my risk for cirrhosis? · Long-term alcohol use    · Hepatitis B or C infection    · Fat or iron buildup in the liver    · A disease such as cystic fibrosis, or a family history of liver cancer    · Damage to the bile ducts that blocks the flow of bile    · Obesity  What are the signs and symptoms of cirrhosis? You may not have any signs or symptoms until your liver damage is severe  You may have any of the following:  · Fatigue    · Bleeding and bruising easily    · Swelling of your feet, legs, or abdomen    · Nausea, loss of appetite, and weight loss    · Itching    · Jaundice (yellowing of your skin or eyes)    · Black bowel movements or dark urine  How is cirrhosis diagnosed? · Blood tests  may be used to check your liver enzymes  · An ultrasound  may be used to check for damage to your liver or other tissues or organs  · CT or MRI  pictures may be taken of your liver  You may be given IV contrast liquid to help your liver show up better in the pictures  Tell the healthcare provider if you have ever had an allergic reaction to contrast liquid  Do not enter the MRI room with anything made of metal  Metal can cause serious injury  Tell the healthcare provider if you have any metal in or on your body  · A liver biopsy  is a procedure used to take a small piece of your liver to be tested for liver damage  How is cirrhosis treated? · Medicines  may be used to treat high blood pressure in the portal vein (the vein that goes to your liver)   You may also need medicine to decrease extra fluid that collects in an area such as your legs or abdomen  Medicines may be used to decrease itching, or to treat a bacterial or viral infection  · Surgery  may be used to create a channel inside your liver to increase blood flow  This will help decrease swelling in your abdomen and lower blood pressure in the portal vein  Your risk for bleeding in your esophagus and stomach will also be decreased  You may need a liver transplant if your liver fails  What can I do to manage cirrhosis? · Do not drink alcohol  Alcohol will cause more damage to your liver  · Do not smoke  Nicotine and other chemicals in cigarettes and cigars can cause blood vessel and lung damage  Ask your healthcare provider for information if you currently smoke and need help to quit  E-cigarettes or smokeless tobacco still contain nicotine  Talk to your healthcare provider before you use these products  · Eat a variety of healthy foods  Healthy foods include fruits, vegetables, whole-grain breads, low-fat dairy products, beans, lean meat, and fish  Ask if you need to be on a special diet  · Reach or maintain a healthy weight  You may develop fatty liver disease if you are overweight  Ask your healthcare provider for a healthy weight for you  He can help you create a safe weight loss plan if you are overweight  · Limit sodium (salt)  You may need to decrease the amount of sodium you eat if you have swelling caused by fluid buildup  Sodium is found in table salt and salty foods such as canned foods, frozen foods, and potato chips  · Drink liquids as directed  Ask how much liquid to drink each day and which liquids are best for you  For most people, good liquids to drink are water, juice, and milk  Liquids can help your liver work better  · Ask about vaccines  You may have a hard time fighting infection because of cirrhosis   Vaccines help protect you against viruses that can cause diseases such as the flu or hepatitis  Viral hepatitis is caused by a virus that leads to inflammation of the liver  You may need a hepatitis A or B vaccine  You may also need a pneumonia vaccine  Always get a flu vaccine each year as soon as it becomes available  · Ask about medicines  Some medicines can harm your liver  Acetaminophen is an example  Talk to your healthcare provider about all your medicines  Do not take any over-the-counter medicine or herbal supplements until your healthcare provider says it is okay  When should I seek immediate care? · You have pain during a bowel movement and it is black or contains blood  · You have a fast heart rate and fast breathing  · You are dizzy or confused  · You have severe pain in your abdomen  · You have trouble breathing  · Your vomit looks like it has coffee grinds or blood in it  When should I contact my healthcare provider? · You have a fever  · You have red or itchy skin  · You are in pain and feel weak  · You have questions or concerns about your condition or care  CARE AGREEMENT:   You have the right to help plan your care  Learn about your health condition and how it may be treated  Discuss treatment options with your caregivers to decide what care you want to receive  You always have the right to refuse treatment  The above information is an  only  It is not intended as medical advice for individual conditions or treatments  Talk to your doctor, nurse or pharmacist before following any medical regimen to see if it is safe and effective for you  © 2017 [employer] Information is for End User's use only and may not be sold, redistributed or otherwise used for commercial purposes  All illustrations and images included in CareNotes® are the copyrighted property of [employer]  or [employer]by [employer]agan    Chronic Hypertension   AMBULATORY CARE:   Hypertension  is high blood pressure (BP)  Your BP is the force of your blood moving against the walls of your arteries  Normal BP is less than 120/80  Prehypertension is between 120/80 and 139/89  Hypertension is 140/90 or higher  Hypertension causes your BP to get so high that your heart has to work much harder than normal  This can damage your heart  Chronic hypertension is a long-term condition that you can control with a healthy lifestyle or medicines  A controlled blood pressure helps protect your organs, such as your heart, lungs, brain, and kidneys  Common symptoms include the following:   · Headache     · Blurred vision    · Chest pain     · Dizziness or weakness     · Trouble breathing     · Nosebleeds  Call 911 for any of the following:   · You have discomfort in your chest that feels like squeezing, pressure, fullness, or pain  · You become confused or have difficulty speaking  · You suddenly feel lightheaded or have trouble breathing  · You have pain or discomfort in your back, neck, jaw, stomach, or arm  Seek care immediately if:   · You have a severe headache or vision loss  · You have weakness in an arm or leg  Contact your healthcare provider if:   · You feel faint, dizzy, confused, or drowsy  · You have been taking your BP medicine and your BP is still higher than your healthcare provider says it should be  · You have questions or concerns about your condition or care  Treatment for chronic hypertension  may include medicine to lower your BP and lower your cholesterol level  A low cholesterol level helps prevent heart disease and makes it easier to control your blood pressure  Heart disease can make your blood pressure harder to control  You may also need to make lifestyle changes  Take your medicine exactly as directed  Manage chronic hypertension:  Talk with your healthcare provider about these and other ways to manage hypertension:  · Take your BP at home    Sit and rest for 5 minutes before you take your BP  Extend your arm and support it on a flat surface  Your arm should be at the same level as your heart  Follow the directions that came with your BP monitor  If possible, take at least 2 BP readings each time  Take your BP at least twice a day at the same times each day, such as morning and evening  Keep a record of your BP readings and bring it to your follow-up visits  Ask your healthcare provider what your blood pressure should be  · Limit sodium (salt) as directed  Too much sodium can affect your fluid balance  Check labels to find low-sodium or no-salt-added foods  Some low-sodium foods use potassium salts for flavor  Too much potassium can also cause health problems  Your healthcare provider will tell you how much sodium and potassium are safe for you to have in a day  He or she may recommend that you limit sodium to 2,300 mg a day  · Follow the meal plan recommended by your healthcare provider  A dietitian or your provider can give you more information on low-sodium plans or the DASH (Dietary Approaches to Stop Hypertension) eating plan  The DASH plan is low in sodium, unhealthy fats, and total fat  It is high in potassium, calcium, and fiber  · Exercise to maintain a healthy weight  Exercise at least 30 minutes per day, on most days of the week  This will help decrease your blood pressure  Ask about the best exercise plan for you  · Decrease stress  This may help lower your BP  Learn ways to relax, such as deep breathing or listening to music  · Limit alcohol  Women should limit alcohol to 1 drink a day  Men should limit alcohol to 2 drinks a day  A drink of alcohol is 12 ounces of beer, 5 ounces of wine, or 1½ ounces of liquor  · Do not smoke  Nicotine and other chemicals in cigarettes and cigars can increase your BP and also cause lung damage  Ask your healthcare provider for information if you currently smoke and need help to quit   E-cigarettes or smokeless tobacco still contain nicotine  Talk to your healthcare provider before you use these products  Follow up with your healthcare provider as directed: You will need to return to have your BP checked and to have other lab tests done  Write down your questions so you remember to ask them during your visits  © 2017 [employer] Information is for End User's use only and may not be sold, redistributed or otherwise used for commercial purposes  All illustrations and images included in CareNotes® are the copyrighted property of [employer]  or Robby Pagan  The above information is an  only  It is not intended as medical advice for individual conditions or treatments  Talk to your doctor, nurse or pharmacist before following any medical regimen to see if it is safe and effective for you

## 2018-11-01 ENCOUNTER — TELEPHONE (OUTPATIENT)
Dept: FAMILY MEDICINE CLINIC | Facility: CLINIC | Age: 58
End: 2018-11-01

## 2018-11-01 NOTE — TELEPHONE ENCOUNTER
Yes that is fine He has had traumatic brain injury as well as hypertension, liver fibrosis, and also hypothyroidism

## 2018-12-04 ENCOUNTER — HOSPITAL ENCOUNTER (OUTPATIENT)
Dept: ULTRASOUND IMAGING | Facility: HOSPITAL | Age: 58
Discharge: HOME | End: 2018-12-04
Attending: INTERNAL MEDICINE
Payer: MEDICARE

## 2018-12-04 ENCOUNTER — APPOINTMENT (OUTPATIENT)
Dept: LAB | Facility: HOSPITAL | Age: 58
End: 2018-12-04
Payer: MEDICARE

## 2018-12-04 DIAGNOSIS — E78.2 MIXED HYPERLIPIDEMIA: ICD-10-CM

## 2018-12-04 DIAGNOSIS — K74.00 LIVER FIBROSIS: ICD-10-CM

## 2018-12-04 DIAGNOSIS — E03.9 ACQUIRED HYPOTHYROIDISM: ICD-10-CM

## 2018-12-04 DIAGNOSIS — I10 ESSENTIAL HYPERTENSION: ICD-10-CM

## 2018-12-04 DIAGNOSIS — K21.9 GERD WITHOUT ESOPHAGITIS: ICD-10-CM

## 2018-12-04 DIAGNOSIS — E03.9 ACQUIRED HYPOTHYROIDISM: Primary | ICD-10-CM

## 2018-12-04 LAB
ALBUMIN SERPL BCP-MCNC: 3.6 G/DL (ref 3.5–5)
ALP SERPL-CCNC: 114 U/L (ref 46–116)
ALT SERPL W P-5'-P-CCNC: 90 U/L (ref 12–78)
ANION GAP SERPL CALCULATED.3IONS-SCNC: 11 MMOL/L (ref 4–13)
AST SERPL W P-5'-P-CCNC: 86 U/L (ref 5–45)
BASOPHILS # BLD AUTO: 0.06 THOUSANDS/ΜL (ref 0–0.1)
BASOPHILS NFR BLD AUTO: 1 % (ref 0–1)
BILIRUB DIRECT SERPL-MCNC: 0.24 MG/DL (ref 0–0.2)
BILIRUB SERPL-MCNC: 0.64 MG/DL (ref 0.2–1)
BUN SERPL-MCNC: 13 MG/DL (ref 5–25)
CALCIUM SERPL-MCNC: 9.2 MG/DL (ref 8.3–10.1)
CHLORIDE SERPL-SCNC: 105 MMOL/L (ref 100–108)
CHOLEST SERPL-MCNC: 155 MG/DL (ref 50–200)
CO2 SERPL-SCNC: 25 MMOL/L (ref 21–32)
CREAT SERPL-MCNC: 1.21 MG/DL (ref 0.6–1.3)
EOSINOPHIL # BLD AUTO: 0.38 THOUSAND/ΜL (ref 0–0.61)
EOSINOPHIL NFR BLD AUTO: 4 % (ref 0–6)
ERYTHROCYTE [DISTWIDTH] IN BLOOD BY AUTOMATED COUNT: 14.9 % (ref 11.6–15.1)
GFR SERPL CREATININE-BSD FRML MDRD: 65 ML/MIN/1.73SQ M
GLUCOSE P FAST SERPL-MCNC: 94 MG/DL (ref 65–99)
HCT VFR BLD AUTO: 46.6 % (ref 36.5–49.3)
HDLC SERPL-MCNC: 86 MG/DL (ref 40–60)
HGB BLD-MCNC: 15.5 G/DL (ref 12–17)
IMM GRANULOCYTES # BLD AUTO: 0.03 THOUSAND/UL (ref 0–0.2)
IMM GRANULOCYTES NFR BLD AUTO: 0 % (ref 0–2)
LDLC SERPL CALC-MCNC: 55 MG/DL (ref 0–100)
LYMPHOCYTES # BLD AUTO: 3.85 THOUSANDS/ΜL (ref 0.6–4.47)
LYMPHOCYTES NFR BLD AUTO: 42 % (ref 14–44)
MCH RBC QN AUTO: 33.4 PG (ref 26.8–34.3)
MCHC RBC AUTO-ENTMCNC: 33.3 G/DL (ref 31.4–37.4)
MCV RBC AUTO: 100 FL (ref 82–98)
MONOCYTES # BLD AUTO: 0.91 THOUSAND/ΜL (ref 0.17–1.22)
MONOCYTES NFR BLD AUTO: 10 % (ref 4–12)
NEUTROPHILS # BLD AUTO: 3.84 THOUSANDS/ΜL (ref 1.85–7.62)
NEUTS SEG NFR BLD AUTO: 43 % (ref 43–75)
NONHDLC SERPL-MCNC: 69 MG/DL
NRBC BLD AUTO-RTO: 0 /100 WBCS
PLATELET # BLD AUTO: 276 THOUSANDS/UL (ref 149–390)
PMV BLD AUTO: 10.5 FL (ref 8.9–12.7)
POTASSIUM SERPL-SCNC: 3.9 MMOL/L (ref 3.5–5.3)
PROT SERPL-MCNC: 7.9 G/DL (ref 6.4–8.2)
RBC # BLD AUTO: 4.64 MILLION/UL (ref 3.88–5.62)
SODIUM SERPL-SCNC: 141 MMOL/L (ref 136–145)
T4 FREE SERPL-MCNC: 0.86 NG/DL (ref 0.76–1.46)
TRIGL SERPL-MCNC: 69 MG/DL
TSH SERPL DL<=0.05 MIU/L-ACNC: 7.83 UIU/ML (ref 0.36–3.74)
WBC # BLD AUTO: 9.07 THOUSAND/UL (ref 4.31–10.16)

## 2018-12-04 PROCEDURE — 84443 ASSAY THYROID STIM HORMONE: CPT

## 2018-12-04 PROCEDURE — 76705 ECHO EXAM OF ABDOMEN: CPT

## 2018-12-04 PROCEDURE — 80053 COMPREHEN METABOLIC PANEL: CPT

## 2018-12-04 PROCEDURE — 85025 COMPLETE CBC W/AUTO DIFF WBC: CPT

## 2018-12-04 PROCEDURE — 80061 LIPID PANEL: CPT

## 2018-12-04 PROCEDURE — 36415 COLL VENOUS BLD VENIPUNCTURE: CPT

## 2018-12-04 PROCEDURE — 84439 ASSAY OF FREE THYROXINE: CPT

## 2018-12-04 PROCEDURE — 82248 BILIRUBIN DIRECT: CPT

## 2018-12-04 RX ORDER — LEVOTHYROXINE SODIUM 0.07 MG/1
75 TABLET ORAL DAILY
Qty: 30 TABLET | Refills: 3 | Status: SHIPPED | OUTPATIENT
Start: 2018-12-04 | End: 2019-03-17 | Stop reason: SDUPTHER

## 2018-12-19 ENCOUNTER — OFFICE VISIT (OUTPATIENT)
Dept: GASTROENTEROLOGY | Facility: MEDICAL CENTER | Age: 58
End: 2018-12-19
Payer: MEDICARE

## 2018-12-19 VITALS
SYSTOLIC BLOOD PRESSURE: 136 MMHG | HEART RATE: 99 BPM | BODY MASS INDEX: 24.94 KG/M2 | DIASTOLIC BLOOD PRESSURE: 72 MMHG | WEIGHT: 189 LBS | TEMPERATURE: 98.6 F

## 2018-12-19 DIAGNOSIS — K75.81 STEATOHEPATITIS: Primary | ICD-10-CM

## 2018-12-19 DIAGNOSIS — K21.9 GERD WITHOUT ESOPHAGITIS: ICD-10-CM

## 2018-12-19 PROCEDURE — 99214 OFFICE O/P EST MOD 30 MIN: CPT | Performed by: PHYSICIAN ASSISTANT
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
## 2019-01-03 DIAGNOSIS — E78.2 MIXED HYPERLIPIDEMIA: ICD-10-CM

## 2019-01-03 RX ORDER — ATORVASTATIN CALCIUM 40 MG/1
TABLET, FILM COATED ORAL
Qty: 90 TABLET | Refills: 0 | Status: SHIPPED | OUTPATIENT
Start: 2019-01-03 | End: 2019-04-03 | Stop reason: SDUPTHER

## 2019-01-08 DIAGNOSIS — E87.6 HYPOKALEMIA: ICD-10-CM

## 2019-01-08 RX ORDER — POTASSIUM CHLORIDE 750 MG/1
CAPSULE, EXTENDED RELEASE ORAL
Qty: 90 CAPSULE | Refills: 0 | Status: SHIPPED | OUTPATIENT
Start: 2019-01-08 | End: 2019-04-03 | Stop reason: SDUPTHER

## 2019-01-29 DIAGNOSIS — K21.9 GASTROESOPHAGEAL REFLUX DISEASE WITHOUT ESOPHAGITIS: ICD-10-CM

## 2019-01-29 RX ORDER — FAMOTIDINE 20 MG/1
TABLET, FILM COATED ORAL
Qty: 180 TABLET | Refills: 0 | Status: SHIPPED | OUTPATIENT
Start: 2019-01-29 | End: 2019-04-25 | Stop reason: SDUPTHER

## 2019-02-18 DIAGNOSIS — I10 ESSENTIAL HYPERTENSION: ICD-10-CM

## 2019-02-18 RX ORDER — AMLODIPINE BESYLATE 5 MG/1
TABLET ORAL
Qty: 180 TABLET | Refills: 0 | Status: SHIPPED | OUTPATIENT
Start: 2019-02-18 | End: 2019-05-15 | Stop reason: SDUPTHER

## 2019-02-25 ENCOUNTER — APPOINTMENT (OUTPATIENT)
Dept: LAB | Facility: HOSPITAL | Age: 59
End: 2019-02-25
Payer: MEDICARE

## 2019-02-25 DIAGNOSIS — K75.81 STEATOHEPATITIS: ICD-10-CM

## 2019-02-25 LAB
ALBUMIN SERPL BCP-MCNC: 3.5 G/DL (ref 3.5–5)
ALP SERPL-CCNC: 112 U/L (ref 46–116)
ALT SERPL W P-5'-P-CCNC: 80 U/L (ref 12–78)
ANION GAP SERPL CALCULATED.3IONS-SCNC: 10 MMOL/L (ref 4–13)
AST SERPL W P-5'-P-CCNC: 71 U/L (ref 5–45)
BASOPHILS # BLD AUTO: 0.09 THOUSANDS/ΜL (ref 0–0.1)
BASOPHILS NFR BLD AUTO: 1 % (ref 0–1)
BILIRUB SERPL-MCNC: 0.56 MG/DL (ref 0.2–1)
BUN SERPL-MCNC: 13 MG/DL (ref 5–25)
CALCIUM SERPL-MCNC: 9.1 MG/DL (ref 8.3–10.1)
CHLORIDE SERPL-SCNC: 104 MMOL/L (ref 100–108)
CO2 SERPL-SCNC: 27 MMOL/L (ref 21–32)
CREAT SERPL-MCNC: 1.23 MG/DL (ref 0.6–1.3)
EOSINOPHIL # BLD AUTO: 0.53 THOUSAND/ΜL (ref 0–0.61)
EOSINOPHIL NFR BLD AUTO: 5 % (ref 0–6)
ERYTHROCYTE [DISTWIDTH] IN BLOOD BY AUTOMATED COUNT: 14.3 % (ref 11.6–15.1)
GFR SERPL CREATININE-BSD FRML MDRD: 64 ML/MIN/1.73SQ M
GLUCOSE P FAST SERPL-MCNC: 85 MG/DL (ref 65–99)
HCT VFR BLD AUTO: 45.8 % (ref 36.5–49.3)
HGB BLD-MCNC: 15 G/DL (ref 12–17)
IMM GRANULOCYTES # BLD AUTO: 0.03 THOUSAND/UL (ref 0–0.2)
IMM GRANULOCYTES NFR BLD AUTO: 0 % (ref 0–2)
INR PPP: 1.16 (ref 0.86–1.17)
LYMPHOCYTES # BLD AUTO: 4.35 THOUSANDS/ΜL (ref 0.6–4.47)
LYMPHOCYTES NFR BLD AUTO: 39 % (ref 14–44)
MCH RBC QN AUTO: 32.8 PG (ref 26.8–34.3)
MCHC RBC AUTO-ENTMCNC: 32.8 G/DL (ref 31.4–37.4)
MCV RBC AUTO: 100 FL (ref 82–98)
MONOCYTES # BLD AUTO: 1.19 THOUSAND/ΜL (ref 0.17–1.22)
MONOCYTES NFR BLD AUTO: 11 % (ref 4–12)
NEUTROPHILS # BLD AUTO: 5.07 THOUSANDS/ΜL (ref 1.85–7.62)
NEUTS SEG NFR BLD AUTO: 44 % (ref 43–75)
NRBC BLD AUTO-RTO: 0 /100 WBCS
PLATELET # BLD AUTO: 254 THOUSANDS/UL (ref 149–390)
PMV BLD AUTO: 11.4 FL (ref 8.9–12.7)
POTASSIUM SERPL-SCNC: 5.1 MMOL/L (ref 3.5–5.3)
PROT SERPL-MCNC: 7.6 G/DL (ref 6.4–8.2)
PROTHROMBIN TIME: 14.9 SECONDS (ref 11.8–14.2)
RBC # BLD AUTO: 4.57 MILLION/UL (ref 3.88–5.62)
SODIUM SERPL-SCNC: 141 MMOL/L (ref 136–145)
WBC # BLD AUTO: 11.26 THOUSAND/UL (ref 4.31–10.16)

## 2019-02-25 PROCEDURE — 85025 COMPLETE CBC W/AUTO DIFF WBC: CPT

## 2019-02-25 PROCEDURE — 82977 ASSAY OF GGT: CPT

## 2019-02-25 PROCEDURE — 82172 ASSAY OF APOLIPOPROTEIN: CPT

## 2019-02-25 PROCEDURE — 36415 COLL VENOUS BLD VENIPUNCTURE: CPT

## 2019-02-25 PROCEDURE — 85610 PROTHROMBIN TIME: CPT

## 2019-02-25 PROCEDURE — 80053 COMPREHEN METABOLIC PANEL: CPT

## 2019-02-25 PROCEDURE — 83010 ASSAY OF HAPTOGLOBIN QUANT: CPT

## 2019-02-25 PROCEDURE — 83883 ASSAY NEPHELOMETRY NOT SPEC: CPT

## 2019-02-25 PROCEDURE — 82247 BILIRUBIN TOTAL: CPT

## 2019-02-25 PROCEDURE — 84460 ALANINE AMINO (ALT) (SGPT): CPT

## 2019-02-27 LAB
A2 MACROGLOB SERPL-MCNC: 428 MG/DL (ref 110–276)
ALT SERPL W P-5'-P-CCNC: 73 IU/L (ref 0–55)
APO A-I SERPL-MCNC: 180 MG/DL (ref 101–178)
BILIRUB SERPL-MCNC: 0.5 MG/DL (ref 0–1.2)
COMMENT: ABNORMAL
FIBROSIS SCORING:: ABNORMAL
FIBROSIS STAGE SERPL QL: ABNORMAL
GGT SERPL-CCNC: 298 IU/L (ref 0–65)
HAPTOGLOB SERPL-MCNC: 193 MG/DL (ref 34–200)
INTERPRETATIONS: ABNORMAL
LIVER FIBR SCORE SERPL CALC.FIBROSURE: 0.71 (ref 0–0.21)
NECROINFLAMM ACTIVITY SCORING:: ABNORMAL
NECROINFLAMMATORY ACT GRADE SERPL QL: ABNORMAL
NECROINFLAMMATORY ACT SCORE SERPL: 0.59 (ref 0–0.17)
SERVICE CMNT-IMP: ABNORMAL

## 2019-03-17 DIAGNOSIS — E03.9 ACQUIRED HYPOTHYROIDISM: ICD-10-CM

## 2019-03-18 RX ORDER — LEVOTHYROXINE SODIUM 0.07 MG/1
TABLET ORAL
Qty: 30 TABLET | Refills: 1 | Status: SHIPPED | OUTPATIENT
Start: 2019-03-18 | End: 2019-04-24

## 2019-04-03 DIAGNOSIS — E78.2 MIXED HYPERLIPIDEMIA: ICD-10-CM

## 2019-04-03 DIAGNOSIS — E87.6 HYPOKALEMIA: ICD-10-CM

## 2019-04-03 RX ORDER — ATORVASTATIN CALCIUM 40 MG/1
TABLET, FILM COATED ORAL
Qty: 90 TABLET | Refills: 0 | Status: SHIPPED | OUTPATIENT
Start: 2019-04-03 | End: 2019-07-01 | Stop reason: SDUPTHER

## 2019-04-03 RX ORDER — POTASSIUM CHLORIDE 750 MG/1
CAPSULE, EXTENDED RELEASE ORAL
Qty: 90 CAPSULE | Refills: 0 | Status: SHIPPED | OUTPATIENT
Start: 2019-04-03 | End: 2019-07-01 | Stop reason: SDUPTHER

## 2019-04-23 ENCOUNTER — OFFICE VISIT (OUTPATIENT)
Dept: GASTROENTEROLOGY | Facility: MEDICAL CENTER | Age: 59
End: 2019-04-23
Payer: MEDICARE

## 2019-04-23 VITALS
HEART RATE: 81 BPM | SYSTOLIC BLOOD PRESSURE: 142 MMHG | DIASTOLIC BLOOD PRESSURE: 84 MMHG | HEIGHT: 73 IN | BODY MASS INDEX: 26.9 KG/M2 | TEMPERATURE: 97.3 F | WEIGHT: 203 LBS

## 2019-04-23 DIAGNOSIS — K21.9 GERD WITHOUT ESOPHAGITIS: ICD-10-CM

## 2019-04-23 DIAGNOSIS — F10.11 HISTORY OF ALCOHOL ABUSE: ICD-10-CM

## 2019-04-23 DIAGNOSIS — K75.81 STEATOHEPATITIS: Primary | ICD-10-CM

## 2019-04-23 DIAGNOSIS — K74.00 LIVER FIBROSIS: ICD-10-CM

## 2019-04-23 PROCEDURE — 99214 OFFICE O/P EST MOD 30 MIN: CPT | Performed by: PHYSICIAN ASSISTANT

## 2019-04-24 ENCOUNTER — OFFICE VISIT (OUTPATIENT)
Dept: FAMILY MEDICINE CLINIC | Facility: CLINIC | Age: 59
End: 2019-04-24
Payer: MEDICARE

## 2019-04-24 VITALS
WEIGHT: 205.8 LBS | SYSTOLIC BLOOD PRESSURE: 138 MMHG | DIASTOLIC BLOOD PRESSURE: 84 MMHG | HEIGHT: 73 IN | BODY MASS INDEX: 27.28 KG/M2

## 2019-04-24 DIAGNOSIS — E03.9 ACQUIRED HYPOTHYROIDISM: ICD-10-CM

## 2019-04-24 DIAGNOSIS — K21.9 GERD WITHOUT ESOPHAGITIS: ICD-10-CM

## 2019-04-24 DIAGNOSIS — R74.8 ELEVATED LIVER ENZYMES: ICD-10-CM

## 2019-04-24 DIAGNOSIS — E78.2 MIXED HYPERLIPIDEMIA: ICD-10-CM

## 2019-04-24 DIAGNOSIS — I10 ESSENTIAL HYPERTENSION: Primary | ICD-10-CM

## 2019-04-24 DIAGNOSIS — J44.9 CHRONIC OBSTRUCTIVE PULMONARY DISEASE, UNSPECIFIED COPD TYPE (HCC): ICD-10-CM

## 2019-04-24 DIAGNOSIS — K75.81 STEATOHEPATITIS: ICD-10-CM

## 2019-04-24 PROCEDURE — 99214 OFFICE O/P EST MOD 30 MIN: CPT | Performed by: FAMILY MEDICINE

## 2019-04-25 DIAGNOSIS — K21.9 GASTROESOPHAGEAL REFLUX DISEASE WITHOUT ESOPHAGITIS: ICD-10-CM

## 2019-04-25 RX ORDER — FAMOTIDINE 20 MG/1
TABLET, FILM COATED ORAL
Qty: 180 TABLET | Refills: 0 | Status: SHIPPED | OUTPATIENT
Start: 2019-04-25 | End: 2019-07-31 | Stop reason: SDUPTHER

## 2019-05-15 DIAGNOSIS — I10 ESSENTIAL HYPERTENSION: ICD-10-CM

## 2019-05-15 RX ORDER — AMLODIPINE BESYLATE 5 MG/1
TABLET ORAL
Qty: 180 TABLET | Refills: 0 | Status: SHIPPED | OUTPATIENT
Start: 2019-05-15 | End: 2019-08-10 | Stop reason: SDUPTHER

## 2019-06-05 DIAGNOSIS — E03.9 ACQUIRED HYPOTHYROIDISM: ICD-10-CM

## 2019-06-05 RX ORDER — LEVOTHYROXINE SODIUM 0.07 MG/1
TABLET ORAL
Qty: 30 TABLET | Refills: 1 | Status: SHIPPED | OUTPATIENT
Start: 2019-06-05 | End: 2019-08-04 | Stop reason: SDUPTHER

## 2019-06-17 ENCOUNTER — APPOINTMENT (OUTPATIENT)
Dept: LAB | Facility: HOSPITAL | Age: 59
End: 2019-06-17
Payer: MEDICARE

## 2019-06-17 DIAGNOSIS — E78.2 MIXED HYPERLIPIDEMIA: ICD-10-CM

## 2019-06-17 DIAGNOSIS — I10 ESSENTIAL HYPERTENSION: ICD-10-CM

## 2019-06-17 DIAGNOSIS — E03.9 ACQUIRED HYPOTHYROIDISM: ICD-10-CM

## 2019-06-17 LAB
ALBUMIN SERPL BCP-MCNC: 3.6 G/DL (ref 3.5–5)
ALP SERPL-CCNC: 106 U/L (ref 46–116)
ALT SERPL W P-5'-P-CCNC: 64 U/L (ref 12–78)
ANION GAP SERPL CALCULATED.3IONS-SCNC: 13 MMOL/L (ref 4–13)
AST SERPL W P-5'-P-CCNC: 53 U/L (ref 5–45)
BILIRUB SERPL-MCNC: 0.66 MG/DL (ref 0.2–1)
BUN SERPL-MCNC: 15 MG/DL (ref 5–25)
CALCIUM SERPL-MCNC: 9.3 MG/DL (ref 8.3–10.1)
CHLORIDE SERPL-SCNC: 105 MMOL/L (ref 100–108)
CHOLEST SERPL-MCNC: 133 MG/DL (ref 50–200)
CO2 SERPL-SCNC: 24 MMOL/L (ref 21–32)
CREAT SERPL-MCNC: 1.14 MG/DL (ref 0.6–1.3)
GFR SERPL CREATININE-BSD FRML MDRD: 70 ML/MIN/1.73SQ M
GLUCOSE P FAST SERPL-MCNC: 99 MG/DL (ref 65–99)
HDLC SERPL-MCNC: 46 MG/DL (ref 40–60)
LDLC SERPL CALC-MCNC: 29 MG/DL (ref 0–100)
NONHDLC SERPL-MCNC: 87 MG/DL
POTASSIUM SERPL-SCNC: 3.8 MMOL/L (ref 3.5–5.3)
PROT SERPL-MCNC: 7.9 G/DL (ref 6.4–8.2)
SODIUM SERPL-SCNC: 142 MMOL/L (ref 136–145)
T4 FREE SERPL-MCNC: 0.97 NG/DL (ref 0.76–1.46)
TRIGL SERPL-MCNC: 289 MG/DL
TSH SERPL DL<=0.05 MIU/L-ACNC: 6.75 UIU/ML (ref 0.36–3.74)

## 2019-06-17 PROCEDURE — 80061 LIPID PANEL: CPT

## 2019-06-17 PROCEDURE — 84439 ASSAY OF FREE THYROXINE: CPT

## 2019-06-17 PROCEDURE — 84443 ASSAY THYROID STIM HORMONE: CPT

## 2019-06-17 PROCEDURE — 80053 COMPREHEN METABOLIC PANEL: CPT

## 2019-06-17 PROCEDURE — 36415 COLL VENOUS BLD VENIPUNCTURE: CPT

## 2019-07-01 DIAGNOSIS — E78.2 MIXED HYPERLIPIDEMIA: ICD-10-CM

## 2019-07-01 DIAGNOSIS — E87.6 HYPOKALEMIA: ICD-10-CM

## 2019-07-01 RX ORDER — POTASSIUM CHLORIDE 750 MG/1
CAPSULE, EXTENDED RELEASE ORAL
Qty: 90 CAPSULE | Refills: 0 | Status: SHIPPED | OUTPATIENT
Start: 2019-07-01 | End: 2019-10-03 | Stop reason: SDUPTHER

## 2019-07-01 RX ORDER — ATORVASTATIN CALCIUM 40 MG/1
TABLET, FILM COATED ORAL
Qty: 90 TABLET | Refills: 0 | Status: SHIPPED | OUTPATIENT
Start: 2019-07-01 | End: 2019-09-29 | Stop reason: SDUPTHER

## 2019-07-31 DIAGNOSIS — K21.9 GASTROESOPHAGEAL REFLUX DISEASE WITHOUT ESOPHAGITIS: ICD-10-CM

## 2019-07-31 RX ORDER — FAMOTIDINE 20 MG/1
TABLET, FILM COATED ORAL
Qty: 180 TABLET | Refills: 0 | Status: SHIPPED | OUTPATIENT
Start: 2019-07-31 | End: 2019-10-22 | Stop reason: SDUPTHER

## 2019-08-04 DIAGNOSIS — E03.9 ACQUIRED HYPOTHYROIDISM: ICD-10-CM

## 2019-08-04 RX ORDER — LEVOTHYROXINE SODIUM 0.07 MG/1
TABLET ORAL
Qty: 30 TABLET | Refills: 1 | Status: SHIPPED | OUTPATIENT
Start: 2019-08-04 | End: 2019-08-28

## 2019-08-10 DIAGNOSIS — I10 ESSENTIAL HYPERTENSION: ICD-10-CM

## 2019-08-12 RX ORDER — AMLODIPINE BESYLATE 5 MG/1
TABLET ORAL
Qty: 180 TABLET | Refills: 0 | Status: SHIPPED | OUTPATIENT
Start: 2019-08-12 | End: 2019-11-04 | Stop reason: SDUPTHER

## 2019-08-27 DIAGNOSIS — E03.9 ACQUIRED HYPOTHYROIDISM: ICD-10-CM

## 2019-08-27 RX ORDER — LEVOTHYROXINE SODIUM 0.07 MG/1
TABLET ORAL
Qty: 30 TABLET | Refills: 1 | OUTPATIENT
Start: 2019-08-27

## 2019-08-28 DIAGNOSIS — E03.9 ACQUIRED HYPOTHYROIDISM: ICD-10-CM

## 2019-08-28 RX ORDER — LEVOTHYROXINE SODIUM 0.07 MG/1
75 TABLET ORAL DAILY
Qty: 30 TABLET | Refills: 3 | Status: SHIPPED | OUTPATIENT
Start: 2019-08-28 | End: 2019-11-21 | Stop reason: SDUPTHER

## 2019-09-09 ENCOUNTER — OFFICE VISIT (OUTPATIENT)
Dept: FAMILY MEDICINE CLINIC | Facility: CLINIC | Age: 59
End: 2019-09-09
Payer: MEDICARE

## 2019-09-09 VITALS
SYSTOLIC BLOOD PRESSURE: 130 MMHG | DIASTOLIC BLOOD PRESSURE: 78 MMHG | HEIGHT: 73 IN | WEIGHT: 203.4 LBS | BODY MASS INDEX: 26.96 KG/M2

## 2019-09-09 DIAGNOSIS — K75.81 STEATOHEPATITIS: ICD-10-CM

## 2019-09-09 DIAGNOSIS — E03.9 ACQUIRED HYPOTHYROIDISM: ICD-10-CM

## 2019-09-09 DIAGNOSIS — Z12.5 SCREENING FOR PROSTATE CANCER: ICD-10-CM

## 2019-09-09 DIAGNOSIS — E66.3 OVERWEIGHT (BMI 25.0-29.9): ICD-10-CM

## 2019-09-09 DIAGNOSIS — Z00.00 MEDICARE ANNUAL WELLNESS VISIT, INITIAL: ICD-10-CM

## 2019-09-09 DIAGNOSIS — K21.9 GERD WITHOUT ESOPHAGITIS: ICD-10-CM

## 2019-09-09 DIAGNOSIS — R74.8 ELEVATED LIVER ENZYMES: ICD-10-CM

## 2019-09-09 DIAGNOSIS — E78.2 MIXED HYPERLIPIDEMIA: ICD-10-CM

## 2019-09-09 DIAGNOSIS — I10 ESSENTIAL HYPERTENSION: Primary | ICD-10-CM

## 2019-09-09 PROCEDURE — G0439 PPPS, SUBSEQ VISIT: HCPCS | Performed by: FAMILY MEDICINE

## 2019-09-09 PROCEDURE — 99214 OFFICE O/P EST MOD 30 MIN: CPT | Performed by: FAMILY MEDICINE

## 2019-09-09 NOTE — PATIENT INSTRUCTIONS
Obesity   AMBULATORY CARE:   Obesity  is when your body mass index (BMI) is greater than 30  Your healthcare provider will use your height and weight to measure your BMI  The risks of obesity include  many health problems, such as injuries or physical disability  You may need tests to check for the following:  · Diabetes     · High blood pressure or high cholesterol     · Heart disease     · Gallbladder or liver disease     · Cancer of the colon, breast, prostate, liver, or kidney     · Sleep apnea     · Arthritis or gout  Seek care immediately if:   · You have a severe headache, confusion, or difficulty speaking  · You have weakness on one side of your body  · You have chest pain, sweating, or shortness of breath  Contact your healthcare provider if:   · You have symptoms of gallbladder or liver disease, such as pain in your upper abdomen  · You have knee or hip pain and discomfort while walking  · You have symptoms of diabetes, such as intense hunger and thirst, and frequent urination  · You have symptoms of sleep apnea, such as snoring or daytime sleepiness  · You have questions or concerns about your condition or care  Treatment for obesity  focuses on helping you lose weight to improve your health  Even a small decrease in BMI can reduce the risk for many health problems  Your healthcare provider will help you set a weight-loss goal   · Lifestyle changes  are the first step in treating obesity  These include making healthy food choices and getting regular physical activity  Your healthcare provider may suggest a weight-loss program that involves coaching, education, and therapy  · Medicine  may help you lose weight when it is used with a healthy diet and physical activity  · Surgery  can help you lose weight if you are very obese and have other health problems  There are several types of weight-loss surgery  Ask your healthcare provider for more information    Be successful losing weight:   · Set small, realistic goals  An example of a small goal is to walk for 20 minutes 5 days a week  Anther goal is to lose 5% of your body weight  · Tell friends, family members, and coworkers about your goals  and ask for their support  Ask a friend to lose weight with you, or join a weight-loss support group  · Identify foods or triggers that may cause you to overeat , and find ways to avoid them  Remove tempting high-calorie foods from your home and workplace  Place a bowl of fresh fruit on your kitchen counter  If stress causes you to eat, then find other ways to cope with stress  · Keep a diary to track what you eat and drink  Also write down how many minutes of physical activity you do each day  Weigh yourself once a week and record it in your diary  Eating changes: You will need to eat 500 to 1,000 fewer calories each day than you currently eat to lose 1 to 2 pounds a week  The following changes will help you cut calories:  · Eat smaller portions  Use small plates, no larger than 9 inches in diameter  Fill your plate half full of fruits and vegetables  Measure your food using measuring cups until you know what a serving size looks like  · Eat 3 meals and 1 or 2 snacks each day  Plan your meals in advance  Elyce [employer] and eat at home most of the time  Eat slowly  · Eat fruits and vegetables at every meal   They are low in calories and high in fiber, which makes you feel full  Do not add butter, margarine, or cream sauce to vegetables  Use herbs to season steamed vegetables  · Eat less fat and fewer fried foods  Eat more baked or grilled chicken and fish  These protein sources are lower in calories and fat than red meat  Limit fast food  Dress your salads with olive oil and vinegar instead of bottled dressing  · Limit the amount of sugar you eat  Do not drink sugary beverages  Limit alcohol  Activity changes:  Physical activity is good for your body in many ways   It helps you burn calories and build strong muscles  It decreases stress and depression, and improves your mood  It can also help you sleep better  Talk to your healthcare provider before you begin an exercise program   · Exercise for at least 30 minutes 5 days a week  Start slowly  Set aside time each day for physical activity that you enjoy and that is convenient for you  It is best to do both weight training and an activity that increases your heart rate, such as walking, bicycling, or swimming  · Find ways to be more active  Do yard work and housecleaning  Walk up the stairs instead of using elevators  Spend your leisure time going to events that require walking, such as outdoor festivals or fairs  This extra physical activity can help you lose weight and keep it off  Follow up with your healthcare provider as directed: You may need to meet with a dietitian  Write down your questions so you remember to ask them during your visits  © 2017 [address] [employer] Information is for End User's use only and may not be sold, redistributed or otherwise used for commercial purposes  All illustrations and images included in CareNotes® are the copyrighted property of [employer]  or Robby Pagan  The above information is an  only  It is not intended as medical advice for individual conditions or treatments  Talk to your doctor, nurse or pharmacist before following any medical regimen to see if it is safe and effective for you  Urinary Incontinence   WHAT YOU NEED TO KNOW:   What is urinary incontinence? Urinary incontinence (UI) is when you lose control of your bladder  What causes UI? UI occurs because your bladder cannot store or empty urine properly  The following are the most common types of UI:  · Stress incontinence  is when you leak urine due to increased bladder pressure  This may happen when you cough, sneeze, or exercise       · Urge incontinence  is when you feel the need to urinate right away and leak urine accidentally  · Mixed incontinence  is when you have both stress and urge UI  What are the signs and symptoms of UI?   · You feel like your bladder does not empty completely when you urinate  · You urinate often and need to urinate immediately  · You leak urine when you sleep, or you wake up with the urge to urinate  · You leak urine when you cough, sneeze, exercise, or laugh  How is UI diagnosed? Your healthcare provider will ask how often you leak urine and whether you have stress or urge symptoms  Tell him which medicines you take, how often you urinate, and how much liquid you drink each day  You may need any of the following tests:  · Urine tests  may show infection or kidney function  · A pelvic exam  may be done to check for blockages  A pelvic exam will also show if your bladder, uterus, or other organs have moved out of place  · An x-ray, ultrasound, or CT  may show problems with parts of your urinary system  You may be given contrast liquid to help your organs show up better in the pictures  Tell the healthcare provider if you have ever had an allergic reaction to contrast liquid  Do not enter the MRI room with anything metal  Metal can cause serious injury  Tell the healthcare provider if you have any metal in or on your body  · A bladder scan  will show how much urine is left in your bladder after you urinate  You will be asked to urinate and then healthcare providers will use a small ultrasound machine to check the urine left in your bladder  · Cystometry  is used to check the function of your urinary system  Your healthcare provider checks the pressure in your bladder while filling it with fluid  Your bladder pressure may also be tested when your bladder is full and while you urinate  How is UI treated? · Medicines  can help strengthen your bladder control      · Electrical stimulation  is used to send a small amount of electrical energy to your pelvic floor muscles  This helps control your bladder function  Electrodes may be placed outside your body or in your rectum  For women, the electrodes may be placed in the vagina  · A bulking agent  may be injected into the wall of your urethra to make it thicker  This helps keep your urethra closed and decreases urine leakage  · Devices  such as a clamp, pessary, or tampon may help stop urine leaks  Ask your healthcare provider for more information about these and other devices  · Surgery  may be needed if other treatments do not work  Several types of surgery can help improve your bladder control  Ask your healthcare provider for more information about the surgery you may need  How can I manage my symptoms? · Do pelvic muscle exercises often  Your pelvic muscles help you stop urinating  Squeeze these muscles tight for 5 seconds, then relax for 5 seconds  Gradually work up to squeezing for 10 seconds  Do 3 sets of 15 repetitions a day, or as directed  This will help strengthen your pelvic muscles and improve bladder control  · A catheter  may be used to help empty your bladder  A catheter is a tiny, plastic tube that is put into your bladder to drain your urine  Your healthcare provider may tell you to use a catheter to prevent your bladder from getting too full and leaking urine  · Keep a UI record  Write down how often you leak urine and how much you leak  Make a note of what you were doing when you leaked urine  · Train your bladder  Go to the bathroom at set times, such as every 2 hours, even if you do not feel the urge to go  You can also try to hold your urine when you feel the urge to go  For example, hold your urine for 5 minutes when you feel the urge to go  As that becomes easier, hold your urine for 10 minutes  · Drink liquids as directed  Ask your healthcare provider how much liquid to drink each day and which liquids are best for you   You may need to limit the amount of liquid you drink to help control your urine leakage  Limit or do not have drinks that contain caffeine or alcohol  Do not drink any liquid right before you go to bed  · Prevent constipation  Eat a variety of high-fiber foods  Good examples are high-fiber cereals, beans, vegetables, and whole-grain breads  Prune juice may help make your bowel movement softer  Walking is the best way to trigger your intestines to have a bowel movement  · Exercise regularly and maintain a healthy weight  Ask your healthcare provider how much you should weigh and about the best exercise plan for you  Weight loss and exercise will decrease pressure on your bladder and help you control your leakage  Ask him to help you create a weight loss plan if you are overweight  When should I seek immediate care? · You have severe pain  · You are confused or cannot think clearly  When should I contact my healthcare provider? · You have a fever  · You see blood in your urine  · You have pain when you urinate  · You have new or worse pain, even after treatment  · Your mouth feels dry or you have vision changes  · Your urine is cloudy or smells bad  · You have questions or concerns about your condition or care  CARE AGREEMENT:   You have the right to help plan your care  Learn about your health condition and how it may be treated  Discuss treatment options with your caregivers to decide what care you want to receive  You always have the right to refuse treatment  The above information is an  only  It is not intended as medical advice for individual conditions or treatments  Talk to your doctor, nurse or pharmacist before following any medical regimen to see if it is safe and effective for you  © 2017 [address] [employer] Information is for End User's use only and may not be sold, redistributed or otherwise used for commercial purposes   All illustrations and images included in CareNotes® are the copyrighted property of [employer] M [employer]  or Robby Pagan  Cigarette Smoking and Your Health   AMBULATORY CARE:   Risks to your health if you smoke:  Nicotine and other chemicals found in tobacco damage every cell in your body  Even if you are a light smoker, you have an increased risk for cancer, heart disease, and lung disease  If you are pregnant or have diabetes, smoking increases your risk for complications  Benefits to your health if you stop smoking:   · You decrease respiratory symptoms such as coughing, wheezing, and shortness of breath  · You reduce your risk for cancers of the lung, mouth, throat, kidney, bladder, pancreas, stomach, and cervix  If you already have cancer, you increase the benefits of chemotherapy  You also reduce your risk for cancer returning or a second cancer from developing  · You reduce your risk for heart disease, blood clots, heart attack, and stroke  · You reduce your risk for lung infections, and diseases such as pneumonia, asthma, chronic bronchitis, and emphysema  · Your circulation improves  More oxygen can be delivered to your body  If you have diabetes, you lower your risk for complications, such as kidney, artery, and eye diseases  You also lower your risk for nerve damage  Nerve damage can lead to amputations, poor vision, and blindness  · You improve your body's ability to heal and to fight infections  Benefits to the health of others if you stop smoking:  Tobacco is harmful to nonsmokers who breathe in your secondhand smoke  The following are ways the health of others around you may improve when you stop smoking:  · You lower the risks for lung cancer and heart disease in nonsmoking adults  · If you are pregnant, you lower the risk for miscarriage, early delivery, low birth weight, and stillbirth  You also lower your baby's risk for SIDS, obesity, developmental delay, and neurobehavioral problems, such as ADHD  · If you have children, you lower their risk for ear infections, colds, pneumonia, bronchitis, and asthma  For more information and support to stop smoking:   · Smokefree  gov  Phone: 4- 801 - 387-1312  Web Address: www smokefreShares  Follow up with your healthcare provider as directed:  Write down your questions so you remember to ask them during your visits  © 2017 [address] [employer] Information is for End User's use only and may not be sold, redistributed or otherwise used for commercial purposes  All illustrations and images included in CareNotes® are the copyrighted property of [employer]  or Robby Pagan  The above information is an  only  It is not intended as medical advice for individual conditions or treatments  Talk to your doctor, nurse or pharmacist before following any medical regimen to see if it is safe and effective for you  Fall Prevention   WHAT YOU NEED TO KNOW:   What is fall prevention? Fall prevention includes ways to make your home and other areas safer  It also includes ways you can move more carefully to prevent a fall  What increases my risk for falls? · Lack of vitamin D    · Not getting enough sleep each night    · Trouble walking or keeping your balance, or foot problems    · Health conditions that cause changes in your blood pressure, vision, or muscle strength and coordination    · Medicines that make you dizzy, weak, or sleepy    · Problems seeing clearly    · Shoes that have high heels or are not supportive    · Tripping hazards, such as items left on the floor, no handrails on the stairs, or broken steps  How can I help protect myself from falls? · Stand or sit up slowly  This may help you keep your balance and prevent falls  If you need to get up during the night, sit up first  Be sure you are fully awake before you stand  Turn on the light before you start walking  Go slowly in case you are still sleepy   Make sure you will not trip over any pets sleeping in the bedroom  · Use assistive devices as directed  Your healthcare provider may suggest that you use a cane or walker to help you keep your balance  You may need to have grab bars put in your bathroom near the toilet or in the shower  · Wear shoes that fit well and have soles that   Wear shoes both inside and outside  Use slippers with good   Do not wear shoes with high heels  · Wear a personal alarm  This is a device that allows you to call 911 if you fall and need help  Ask your healthcare provider for more information  · Stay active  Exercise can help strengthen your muscles and improve your balance  Your healthcare provider may recommend water aerobics or walking  He or she may also recommend physical therapy to improve your coordination  Never start an exercise program without talking to your healthcare provider first      · Manage medical conditions  Keep all appointments with your healthcare providers  Visit your eye doctor as directed  How can I make my home safer? · Add items to prevent falls in the bathroom  Put nonslip strips on your bath or shower floor to prevent you from slipping  Use a bath mat if you do not have carpet in the bathroom  This will prevent you from falling when you step out of the bath or shower  Use a shower seat so you do not need to stand while you shower  Sit on the toilet or a chair in your bathroom to dry yourself and put on clothing  This will prevent you from losing your balance from drying or dressing yourself while you are standing  · Keep paths clear  Remove books, shoes, and other objects from walkways and stairs  Place cords for telephones and lamps out of the way so that you do not need to walk over them  Tape them down if you cannot move them  Remove small rugs  If you cannot remove a rug, secure it with double-sided tape  This will prevent you from tripping  · Install bright lights in your home  Use night lights to help light paths to the bathroom or kitchen  Always turn on the light before you start walking  · Keep items you use often on shelves within reach  Do not use a step stool to help you reach an item  · Paint or place reflective tape on the edges of your stairs  This will help you see the stairs better  Call 911 or have someone else call if:   · You have fallen and are unconscious  · You have fallen and cannot move part of your body  Contact your healthcare provider if:   · You have fallen and have pain or a headache  · You have questions or concerns about your condition or care  CARE AGREEMENT:   You have the right to help plan your care  Learn about your health condition and how it may be treated  Discuss treatment options with your caregivers to decide what care you want to receive  You always have the right to refuse treatment  The above information is an  only  It is not intended as medical advice for individual conditions or treatments  Talk to your doctor, nurse or pharmacist before following any medical regimen to see if it is safe and effective for you  © 2017 [address] [facility] Information is for End User's use only and may not be sold, redistributed or otherwise used for commercial purposes  All illustrations and images included in CareNotes® are the copyrighted property of [employer]  or Robby Pagan  Advance Directives   WHAT YOU NEED TO KNOW:   What are advance directives? Advance directives are legal documents that state your wishes and plans for medical care  These plans are made ahead of time in case you lose your ability to make decisions for yourself  Advance directives can apply to any medical decision, such as the treatments you want, and if you want to donate organs  What are the types of advance directives? There are many types of advance directives, and each state has rules about how to use them   You may choose a combination of any of the following:  · Living will: This is a written record of the treatment you want  You can also choose which treatments you do not want, which to limit, and which to stop at a certain time  This includes surgery, medicine, IV fluid, and tube feedings  · Durable power of  for healthcare [facility]): This is a written record that states who you want to make healthcare choices for you when you are unable to make them for yourself  This person, called a proxy, is usually a family member or a friend  You may choose more than 1 proxy  · Do not resuscitate (DNR) order:  A DNR order is used in case your heart stops beating or you stop breathing  It is a request not to have certain forms of treatment, such as CPR  A DNR order may be included in other types of advance directives  · Medical directive: This covers the care that you want if you are in a coma, near death, or unable to make decisions for yourself  You can list the treatments you want for each condition  Treatment may include pain medicine, surgery, blood transfusions, dialysis, IV or tube feedings, and a ventilator (breathing machine)  · Values history: This document has questions about your views, beliefs, and how you feel and think about life  This information can help others choose the care that you would choose  Why are advance directives important? An advance directive helps you control your care  Although spoken wishes may be used, it is better to have your wishes written down  Spoken wishes can be misunderstood, or not followed  Treatments may be given even if you do not want them  An advance directive may make it easier for your family to make difficult choices about your care  How do I decide what to put in my advance directives? · Make informed decisions:  Make sure you fully understand treatments or care you may receive   Think about the benefits and problems your decisions could cause for you or your family  Talk to healthcare providers if you have concerns or questions before you write down your wishes  You may also want to talk with your Jehovah's witness or , or a   Check your state laws to make sure that what you put in your advance directive is legal      · Sign all forms:  Sign and date your advance directive when you have finished  You may also need 2 witnesses to sign the forms  Witnesses cannot be your doctor or his staff, your spouse, heirs or beneficiaries, people you owe money to, or your chosen proxy  Talk to your family, proxy, and healthcare providers about your advance directive  Give each person a copy, and keep one for yourself in a place you can get to easily  Do not keep it hidden or locked away  · Review and revise your plans: You can revise your advance directive at any time, as long as you are able to make decisions  Review your plan every year, and when there are changes in your life, or your health  When you make changes, let your family, proxy, and healthcare providers know  Give each a new copy  Where can I find more information? · [employer]  [address] , [address]  Phone: 2- 880 - [phone]  Phone: 5- 341 - [phone]  Web Address: http://www  aafp org  · [address] [facility])  [address] [facility], [address]  Phone: 9- [phone]  Phone: [phone]  Web Address: Mervin green  [facility] AGREEMENT:   You have the right to help plan your care  To help with this plan, you must learn about your health condition and treatment options  You must also learn about advance directives and how they are used  Work with your healthcare providers to decide what care will be used to treat you  You always have the right to refuse treatment  The above information is an  only   It is not intended as medical advice for individual conditions or treatments  Talk to your doctor, nurse or pharmacist before following any medical regimen to see if it is safe and effective for you  © 2017 [address] [employer] Information is for End User's use only and may not be sold, redistributed or otherwise used for commercial purposes  All illustrations and images included in CareNotes® are the copyrighted property of [employer]  or Robby Pagan  Obesity   AMBULATORY CARE:   Obesity  is when your body mass index (BMI) is greater than 30  Your healthcare provider will use your height and weight to measure your BMI  The risks of obesity include  many health problems, such as injuries or physical disability  You may need tests to check for the following:  · Diabetes     · High blood pressure or high cholesterol     · Heart disease     · Gallbladder or liver disease     · Cancer of the colon, breast, prostate, liver, or kidney     · Sleep apnea     · Arthritis or gout  Seek care immediately if:   · You have a severe headache, confusion, or difficulty speaking  · You have weakness on one side of your body  · You have chest pain, sweating, or shortness of breath  Contact your healthcare provider if:   · You have symptoms of gallbladder or liver disease, such as pain in your upper abdomen  · You have knee or hip pain and discomfort while walking  · You have symptoms of diabetes, such as intense hunger and thirst, and frequent urination  · You have symptoms of sleep apnea, such as snoring or daytime sleepiness  · You have questions or concerns about your condition or care  Treatment for obesity  focuses on helping you lose weight to improve your health  Even a small decrease in BMI can reduce the risk for many health problems  Your healthcare provider will help you set a weight-loss goal   · Lifestyle changes  are the first step in treating obesity  These include making healthy food choices and getting regular physical activity  Your healthcare provider may suggest a weight-loss program that involves coaching, education, and therapy  · Medicine  may help you lose weight when it is used with a healthy diet and physical activity  · Surgery  can help you lose weight if you are very obese and have other health problems  There are several types of weight-loss surgery  Ask your healthcare provider for more information  Be successful losing weight:   · Set small, realistic goals  An example of a small goal is to walk for 20 minutes 5 days a week  Anther goal is to lose 5% of your body weight  · Tell friends, family members, and coworkers about your goals  and ask for their support  Ask a friend to lose weight with you, or join a weight-loss support group  · Identify foods or triggers that may cause you to overeat , and find ways to avoid them  Remove tempting high-calorie foods from your home and workplace  Place a bowl of fresh fruit on your kitchen counter  If stress causes you to eat, then find other ways to cope with stress  · Keep a diary to track what you eat and drink  Also write down how many minutes of physical activity you do each day  Weigh yourself once a week and record it in your diary  Eating changes: You will need to eat 500 to 1,000 fewer calories each day than you currently eat to lose 1 to 2 pounds a week  The following changes will help you cut calories:  · Eat smaller portions  Use small plates, no larger than 9 inches in diameter  Fill your plate half full of fruits and vegetables  Measure your food using measuring cups until you know what a serving size looks like  · Eat 3 meals and 1 or 2 snacks each day  Plan your meals in advance  Chelly Gray and eat at home most of the time  Eat slowly  · Eat fruits and vegetables at every meal   They are low in calories and high in fiber, which makes you feel full  Do not add butter, margarine, or cream sauce to vegetables   Use herbs to season steamed vegetables  · Eat less fat and fewer fried foods  Eat more baked or grilled chicken and fish  These protein sources are lower in calories and fat than red meat  Limit fast food  Dress your salads with olive oil and vinegar instead of bottled dressing  · Limit the amount of sugar you eat  Do not drink sugary beverages  Limit alcohol  Activity changes:  Physical activity is good for your body in many ways  It helps you burn calories and build strong muscles  It decreases stress and depression, and improves your mood  It can also help you sleep better  Talk to your healthcare provider before you begin an exercise program   · Exercise for at least 30 minutes 5 days a week  Start slowly  Set aside time each day for physical activity that you enjoy and that is convenient for you  It is best to do both weight training and an activity that increases your heart rate, such as walking, bicycling, or swimming  · Find ways to be more active  Do yard work and housecleaning  Walk up the stairs instead of using elevators  Spend your leisure time going to events that require walking, such as outdoor festivals or fairs  This extra physical activity can help you lose weight and keep it off  Follow up with your healthcare provider as directed: You may need to meet with a dietitian  Write down your questions so you remember to ask them during your visits  © 2017 [address] [employer] Information is for End User's use only and may not be sold, redistributed or otherwise used for commercial purposes  All illustrations and images included in CareNotes® are the copyrighted property of A D A M , Inc  or Robby Pagan  The above information is an  only  It is not intended as medical advice for individual conditions or treatments  Talk to your doctor, nurse or pharmacist before following any medical regimen to see if it is safe and effective for you    Urinary Incontinence   WHAT YOU NEED TO KNOW:   What is urinary incontinence? Urinary incontinence (UI) is when you lose control of your bladder  What causes UI? UI occurs because your bladder cannot store or empty urine properly  The following are the most common types of UI:  · Stress incontinence  is when you leak urine due to increased bladder pressure  This may happen when you cough, sneeze, or exercise  · Urge incontinence  is when you feel the need to urinate right away and leak urine accidentally  · Mixed incontinence  is when you have both stress and urge UI  What are the signs and symptoms of UI?   · You feel like your bladder does not empty completely when you urinate  · You urinate often and need to urinate immediately  · You leak urine when you sleep, or you wake up with the urge to urinate  · You leak urine when you cough, sneeze, exercise, or laugh  How is UI diagnosed? Your healthcare provider will ask how often you leak urine and whether you have stress or urge symptoms  Tell him which medicines you take, how often you urinate, and how much liquid you drink each day  You may need any of the following tests:  · Urine tests  may show infection or kidney function  · A pelvic exam  may be done to check for blockages  A pelvic exam will also show if your bladder, uterus, or other organs have moved out of place  · An x-ray, ultrasound, or CT  may show problems with parts of your urinary system  You may be given contrast liquid to help your organs show up better in the pictures  Tell the healthcare provider if you have ever had an allergic reaction to contrast liquid  Do not enter the MRI room with anything metal  Metal can cause serious injury  Tell the healthcare provider if you have any metal in or on your body  · A bladder scan  will show how much urine is left in your bladder after you urinate   You will be asked to urinate and then healthcare providers will use a small ultrasound machine to check the urine left in your bladder  · Cystometry  is used to check the function of your urinary system  Your healthcare provider checks the pressure in your bladder while filling it with fluid  Your bladder pressure may also be tested when your bladder is full and while you urinate  How is UI treated? · Medicines  can help strengthen your bladder control  · Electrical stimulation  is used to send a small amount of electrical energy to your pelvic floor muscles  This helps control your bladder function  Electrodes may be placed outside your body or in your rectum  For women, the electrodes may be placed in the vagina  · A bulking agent  may be injected into the wall of your urethra to make it thicker  This helps keep your urethra closed and decreases urine leakage  · Devices  such as a clamp, pessary, or tampon may help stop urine leaks  Ask your healthcare provider for more information about these and other devices  · Surgery  may be needed if other treatments do not work  Several types of surgery can help improve your bladder control  Ask your healthcare provider for more information about the surgery you may need  How can I manage my symptoms? · Do pelvic muscle exercises often  Your pelvic muscles help you stop urinating  Squeeze these muscles tight for 5 seconds, then relax for 5 seconds  Gradually work up to squeezing for 10 seconds  Do 3 sets of 15 repetitions a day, or as directed  This will help strengthen your pelvic muscles and improve bladder control  · A catheter  may be used to help empty your bladder  A catheter is a tiny, plastic tube that is put into your bladder to drain your urine  Your healthcare provider may tell you to use a catheter to prevent your bladder from getting too full and leaking urine  · Keep a UI record  Write down how often you leak urine and how much you leak  Make a note of what you were doing when you leaked urine  · Train your bladder    Go to the bathroom at set times, such as every 2 hours, even if you do not feel the urge to go  You can also try to hold your urine when you feel the urge to go  For example, hold your urine for 5 minutes when you feel the urge to go  As that becomes easier, hold your urine for 10 minutes  · Drink liquids as directed  Ask your healthcare provider how much liquid to drink each day and which liquids are best for you  You may need to limit the amount of liquid you drink to help control your urine leakage  Limit or do not have drinks that contain caffeine or alcohol  Do not drink any liquid right before you go to bed  · Prevent constipation  Eat a variety of high-fiber foods  Good examples are high-fiber cereals, beans, vegetables, and whole-grain breads  Prune juice may help make your bowel movement softer  Walking is the best way to trigger your intestines to have a bowel movement  · Exercise regularly and maintain a healthy weight  Ask your healthcare provider how much you should weigh and about the best exercise plan for you  Weight loss and exercise will decrease pressure on your bladder and help you control your leakage  Ask him to help you create a weight loss plan if you are overweight  When should I seek immediate care? · You have severe pain  · You are confused or cannot think clearly  When should I contact my healthcare provider? · You have a fever  · You see blood in your urine  · You have pain when you urinate  · You have new or worse pain, even after treatment  · Your mouth feels dry or you have vision changes  · Your urine is cloudy or smells bad  · You have questions or concerns about your condition or care  CARE AGREEMENT:   You have the right to help plan your care  Learn about your health condition and how it may be treated  Discuss treatment options with your caregivers to decide what care you want to receive  You always have the right to refuse treatment   The above information is an  only  It is not intended as medical advice for individual conditions or treatments  Talk to your doctor, nurse or pharmacist before following any medical regimen to see if it is safe and effective for you  © 2017 [address] [employer] Information is for End User's use only and may not be sold, redistributed or otherwise used for commercial purposes  All illustrations and images included in CareNotes® are the copyrighted property of [employer]  or Robby Latasha  Cigarette Smoking and Your Health   AMBULATORY CARE:   Risks to your health if you smoke:  Nicotine and other chemicals found in tobacco damage every cell in your body  Even if you are a light smoker, you have an increased risk for cancer, heart disease, and lung disease  If you are pregnant or have diabetes, smoking increases your risk for complications  Benefits to your health if you stop smoking:   · You decrease respiratory symptoms such as coughing, wheezing, and shortness of breath  · You reduce your risk for cancers of the lung, mouth, throat, kidney, bladder, pancreas, stomach, and cervix  If you already have cancer, you increase the benefits of chemotherapy  You also reduce your risk for cancer returning or a second cancer from developing  · You reduce your risk for heart disease, blood clots, heart attack, and stroke  · You reduce your risk for lung infections, and diseases such as pneumonia, asthma, chronic bronchitis, and emphysema  · Your circulation improves  More oxygen can be delivered to your body  If you have diabetes, you lower your risk for complications, such as kidney, artery, and eye diseases  You also lower your risk for nerve damage  Nerve damage can lead to amputations, poor vision, and blindness  · You improve your body's ability to heal and to fight infections    Benefits to the health of others if you stop smoking:  Tobacco is harmful to nonsmokers who breathe in your secondhand smoke  The following are ways the health of others around you may improve when you stop smoking:  · You lower the risks for lung cancer and heart disease in nonsmoking adults  · If you are pregnant, you lower the risk for miscarriage, early delivery, low birth weight, and stillbirth  You also lower your baby's risk for SIDS, obesity, developmental delay, and neurobehavioral problems, such as ADHD  · If you have children, you lower their risk for ear infections, colds, pneumonia, bronchitis, and asthma  For more information and support to stop smoking:   · Smokefree  gov  Phone: 2- 918 - 784-9008  Web Address: www [employer]  Follow up with your healthcare provider as directed:  Write down your questions so you remember to ask them during your visits  © 2017 [address] [employer] [employer] Information is for End User's use only and may not be sold, redistributed or otherwise used for commercial purposes  All illustrations and images included in CareNotes® are the copyrighted property of [employer] M , Inc  or Robby Pagna  The above information is an  only  It is not intended as medical advice for individual conditions or treatments  Talk to your doctor, nurse or pharmacist before following any medical regimen to see if it is safe and effective for you  Fall Prevention   WHAT YOU NEED TO KNOW:   What is fall prevention? Fall prevention includes ways to make your home and other areas safer  It also includes ways you can move more carefully to prevent a fall  What increases my risk for falls?    · Lack of vitamin D    · Not getting enough sleep each night    · Trouble walking or keeping your balance, or foot problems    · Health conditions that cause changes in your blood pressure, vision, or muscle strength and coordination    · Medicines that make you dizzy, weak, or sleepy    · Problems seeing clearly    · Shoes that have high heels or are not supportive    · Tripping hazards, such as items left on the floor, no handrails on the stairs, or broken steps  How can I help protect myself from falls? · Stand or sit up slowly  This may help you keep your balance and prevent falls  If you need to get up during the night, sit up first  Be sure you are fully awake before you stand  Turn on the light before you start walking  Go slowly in case you are still sleepy  Make sure you will not trip over any pets sleeping in the bedroom  · Use assistive devices as directed  Your healthcare provider may suggest that you use a cane or walker to help you keep your balance  You may need to have grab bars put in your bathroom near the toilet or in the shower  · Wear shoes that fit well and have soles that   Wear shoes both inside and outside  Use slippers with good   Do not wear shoes with high heels  · Wear a personal alarm  This is a device that allows you to call 911 if you fall and need help  Ask your healthcare provider for more information  · Stay active  Exercise can help strengthen your muscles and improve your balance  Your healthcare provider may recommend water aerobics or walking  He or she may also recommend physical therapy to improve your coordination  Never start an exercise program without talking to your healthcare provider first      · Manage medical conditions  Keep all appointments with your healthcare providers  Visit your eye doctor as directed  How can I make my home safer? · Add items to prevent falls in the bathroom  Put nonslip strips on your bath or shower floor to prevent you from slipping  Use a bath mat if you do not have carpet in the bathroom  This will prevent you from falling when you step out of the bath or shower  Use a shower seat so you do not need to stand while you shower  Sit on the toilet or a chair in your bathroom to dry yourself and put on clothing   This will prevent you from losing your balance from drying or dressing yourself while you are standing  · Keep paths clear  Remove books, shoes, and other objects from walkways and stairs  Place cords for telephones and lamps out of the way so that you do not need to walk over them  Tape them down if you cannot move them  Remove small rugs  If you cannot remove a rug, secure it with double-sided tape  This will prevent you from tripping  · Install bright lights in your home  Use night lights to help light paths to the bathroom or kitchen  Always turn on the light before you start walking  · Keep items you use often on shelves within reach  Do not use a step stool to help you reach an item  · Paint or place reflective tape on the edges of your stairs  This will help you see the stairs better  Call 911 or have someone else call if:   · You have fallen and are unconscious  · You have fallen and cannot move part of your body  Contact your healthcare provider if:   · You have fallen and have pain or a headache  · You have questions or concerns about your condition or care  CARE AGREEMENT:   You have the right to help plan your care  Learn about your health condition and how it may be treated  Discuss treatment options with your caregivers to decide what care you want to receive  You always have the right to refuse treatment  The above information is an  only  It is not intended as medical advice for individual conditions or treatments  Talk to your doctor, nurse or pharmacist before following any medical regimen to see if it is safe and effective for you  © 2017 [address] [employer] Information is for End User's use only and may not be sold, redistributed or otherwise used for commercial purposes  All illustrations and images included in CareNotes® are the copyrighted property of A [employer] M , Inc  or Robby Pagan  Advance Directives   WHAT YOU NEED TO KNOW:   What are advance directives?   Advance directives are legal documents that state your wishes and plans for medical care  These plans are made ahead of time in case you lose your ability to make decisions for yourself  Advance directives can apply to any medical decision, such as the treatments you want, and if you want to donate organs  What are the types of advance directives? There are many types of advance directives, and each state has rules about how to use them  You may choose a combination of any of the following:  · Living will: This is a written record of the treatment you want  You can also choose which treatments you do not want, which to limit, and which to stop at a certain time  This includes surgery, medicine, IV fluid, and tube feedings  · Durable power of  for healthcare [facility]): This is a written record that states who you want to make healthcare choices for you when you are unable to make them for yourself  This person, called a proxy, is usually a family member or a friend  You may choose more than 1 proxy  · Do not resuscitate (DNR) order:  A DNR order is used in case your heart stops beating or you stop breathing  It is a request not to have certain forms of treatment, such as CPR  A DNR order may be included in other types of advance directives  · Medical directive: This covers the care that you want if you are in a coma, near death, or unable to make decisions for yourself  You can list the treatments you want for each condition  Treatment may include pain medicine, surgery, blood transfusions, dialysis, IV or tube feedings, and a ventilator (breathing machine)  · Values history: This document has questions about your views, beliefs, and how you feel and think about life  This information can help others choose the care that you would choose  Why are advance directives important? An advance directive helps you control your care  Although spoken wishes may be used, it is better to have your wishes written down   Spoken wishes can be misunderstood, or not followed  Treatments may be given even if you do not want them  An advance directive may make it easier for your family to make difficult choices about your care  How do I decide what to put in my advance directives? · Make informed decisions:  Make sure you fully understand treatments or care you may receive  Think about the benefits and problems your decisions could cause for you or your family  Talk to healthcare providers if you have concerns or questions before you write down your wishes  You may also want to talk with your Yazidi or , or a   Check your state laws to make sure that what you put in your advance directive is legal      · Sign all forms:  Sign and date your advance directive when you have finished  You may also need 2 witnesses to sign the forms  Witnesses cannot be your doctor or his staff, your spouse, heirs or beneficiaries, people you owe money to, or your chosen proxy  Talk to your family, proxy, and healthcare providers about your advance directive  Give each person a copy, and keep one for yourself in a place you can get to easily  Do not keep it hidden or locked away  · Review and revise your plans: You can revise your advance directive at any time, as long as you are able to make decisions  Review your plan every year, and when there are changes in your life, or your health  When you make changes, let your family, proxy, and healthcare providers know  Give each a new copy  Where can I find more information? · [employer]  [address] , [address]  Phone: [phone]  Phone: 5- [phone]  Web Address: http://www  aafp org  · [address] [facility])  [address] [address] [facility], [address]  Phone: [phone]  Phone: [phone]  Web Address: Mervin green  CARE AGREEMENT:   You have the right to help plan your care  To help with this plan, you must learn about your health condition and treatment options  You must also learn about advance directives and how they are used  Work with your healthcare providers to decide what care will be used to treat you  You always have the right to refuse treatment  The above information is an  only  It is not intended as medical advice for individual conditions or treatments  Talk to your doctor, nurse or pharmacist before following any medical regimen to see if it is safe and effective for you  © 2017 [address]  Information is for End User's use only and may not be sold, redistributed or otherwise used for commercial purposes  All illustrations and images included in CareNotes® are the copyrighted property of [employer]  or Robby Pagan  Weight Management   AMBULATORY CARE:   Why it is important to manage your weight:  Being overweight increases your risk of health conditions such as heart disease, high blood pressure, type 2 diabetes, and certain types of cancer  It can also increase your risk for osteoarthritis, sleep apnea, and other respiratory problems  Aim for a slow, steady weight loss  Even a small amount of weight loss can lower your risk of health problems  How to lose weight safely:  A safe and healthy way to lose weight is to eat fewer calories and get regular exercise  You can lose up about 1 pound a week by decreasing the number of calories you eat by 500 calories each day  You can decrease calories by eating smaller portion sizes or by cutting out high-calorie foods  Read labels to find out how many calories are in the foods you eat  You can also burn calories with exercise such as walking, swimming, or biking  You will be more likely to keep weight off if you make these changes part of your lifestyle     Healthy meal plan for weight management:  A healthy meal plan includes a variety of foods, contains fewer calories, and helps you stay healthy  A healthy meal plan includes the following:  · Eat whole-grain foods more often  A healthy meal plan should contain fiber  Fiber is the part of grains, fruits, and vegetables that is not broken down by your body  Whole-grain foods are healthy and provide extra fiber in your diet  Some examples of whole-grain foods are whole-wheat breads and pastas, oatmeal, brown rice, and bulgur  · Eat a variety of vegetables every day  Include dark, leafy greens such as spinach, kale, michael greens, and mustard greens  Eat yellow and orange vegetables such as carrots, sweet potatoes, and winter squash  · Eat a variety of fruits every day  Choose fresh or canned fruit (canned in its own juice or light syrup) instead of juice  Fruit juice has very little or no fiber  · Eat low-fat dairy foods  Drink fat-free (skim) milk or 1% milk  Eat fat-free yogurt and low-fat cottage cheese  Try low-fat cheeses such as mozzarella and other reduced-fat cheeses  · Choose meat and other protein foods that are low in fat  Choose beans or other legumes such as split peas or lentils  Choose fish, skinless poultry (chicken or turkey), or lean cuts of red meat (beef or pork)  Before you cook meat or poultry, cut off any visible fat  · Use less fat and oil  Try baking foods instead of frying them  Add less fat, such as margarine, sour cream, regular salad dressing and mayonnaise to foods  Eat fewer high-fat foods  Some examples of high-fat foods include french fries, doughnuts, ice cream, and cakes  · Eat fewer sweets  Limit foods and drinks that are high in sugar  This includes candy, cookies, regular soda, and sweetened drinks  Ways to decrease calories:   · Eat smaller portions  ¨ Use a small plate with smaller servings  ¨ Do not eat second helpings  ¨ When you eat at a restaurant, ask for a box and place half of your meal in the box before you eat      ¨ Share an entrée with someone else  · Replace high-calorie snacks with healthy, low-calorie snacks  ¨ Choose fresh fruit, vegetables, fat-free rice cakes, or air-popped popcorn instead of potato chips, nuts, or chocolate  ¨ Choose water or calorie-free drinks instead of soda or sweetened drinks  · Eat regular meals  Skipping meals can lead to overeating later in the day  Eat a healthy snack in place of a meal if you do not have time to eat a regular meal      · Do not shop for groceries when you are hungry  You may be more likely to make unhealthy food choices  Take a grocery list of healthy foods and shop after you have eaten  Exercise:  Exercise at least 30 minutes per day on most days of the week  Some examples of exercise include walking, biking, dancing, and swimming  You can also fit in more physical activity by taking the stairs instead of the elevator or parking farther away from stores  Ask your healthcare provider about the best exercise plan for you  Other things to consider as you try to lose weight:   · Be aware of situations that may give you the urge to overeat, such as eating while watching television  Find ways to avoid these situations  For example, read a book, go for a walk, or do crafts  · Meet with a weight loss support group or friends who are also trying to lose weight  This may help you stay motivated to continue working on your weight loss goals  © 2017 [employer] [employer] Information is for End User's use only and may not be sold, redistributed or otherwise used for commercial purposes  All illustrations and images included in CareNotes® are the copyrighted property of [employer] , [employer]  or [employer]  The above information is an  only  It is not intended as medical advice for individual conditions or treatments  Talk to your doctor, nurse or pharmacist before following any medical regimen to see if it is safe and effective for you

## 2019-09-09 NOTE — ASSESSMENT & PLAN NOTE
Discussed diet and exercise with patient His weight has been stable Patient to see me in 4 [facility]
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
## 2019-09-09 NOTE — ASSESSMENT & PLAN NOTE
Lipids stable on meds Patient needs to continue meds and repeat labs in 12/2019 Follwoup in 4 months

## 2019-09-29 DIAGNOSIS — E78.2 MIXED HYPERLIPIDEMIA: ICD-10-CM

## 2019-09-30 RX ORDER — ATORVASTATIN CALCIUM 40 MG/1
TABLET, FILM COATED ORAL
Qty: 90 TABLET | Refills: 0 | Status: SHIPPED | OUTPATIENT
Start: 2019-09-30 | End: 2019-12-22 | Stop reason: SDUPTHER

## 2019-10-03 DIAGNOSIS — E87.6 HYPOKALEMIA: ICD-10-CM

## 2019-10-03 RX ORDER — POTASSIUM CHLORIDE 750 MG/1
CAPSULE, EXTENDED RELEASE ORAL
Qty: 90 CAPSULE | Refills: 0 | Status: SHIPPED | OUTPATIENT
Start: 2019-10-03 | End: 2019-12-30 | Stop reason: SDUPTHER

## 2019-10-22 ENCOUNTER — APPOINTMENT (OUTPATIENT)
Dept: LAB | Facility: HOSPITAL | Age: 59
End: 2019-10-22
Payer: MEDICARE

## 2019-10-22 DIAGNOSIS — K75.81 STEATOHEPATITIS: ICD-10-CM

## 2019-10-22 DIAGNOSIS — K21.9 GASTROESOPHAGEAL REFLUX DISEASE WITHOUT ESOPHAGITIS: ICD-10-CM

## 2019-10-22 LAB
ALBUMIN SERPL BCP-MCNC: 3.4 G/DL (ref 3.5–5)
ALP SERPL-CCNC: 98 U/L (ref 46–116)
ALT SERPL W P-5'-P-CCNC: 62 U/L (ref 12–78)
ANION GAP SERPL CALCULATED.3IONS-SCNC: 13 MMOL/L (ref 4–13)
AST SERPL W P-5'-P-CCNC: 66 U/L (ref 5–45)
BASOPHILS # BLD AUTO: 0.09 THOUSANDS/ΜL (ref 0–0.1)
BASOPHILS NFR BLD AUTO: 1 % (ref 0–1)
BILIRUB SERPL-MCNC: 1.15 MG/DL (ref 0.2–1)
BUN SERPL-MCNC: 18 MG/DL (ref 5–25)
CALCIUM SERPL-MCNC: 8.8 MG/DL (ref 8.3–10.1)
CHLORIDE SERPL-SCNC: 101 MMOL/L (ref 100–108)
CO2 SERPL-SCNC: 22 MMOL/L (ref 21–32)
CREAT SERPL-MCNC: 1.21 MG/DL (ref 0.6–1.3)
EOSINOPHIL # BLD AUTO: 0.27 THOUSAND/ΜL (ref 0–0.61)
EOSINOPHIL NFR BLD AUTO: 3 % (ref 0–6)
ERYTHROCYTE [DISTWIDTH] IN BLOOD BY AUTOMATED COUNT: 14.5 % (ref 11.6–15.1)
GFR SERPL CREATININE-BSD FRML MDRD: 65 ML/MIN/1.73SQ M
GLUCOSE P FAST SERPL-MCNC: 100 MG/DL (ref 65–99)
HCT VFR BLD AUTO: 44.1 % (ref 36.5–49.3)
HGB BLD-MCNC: 14.8 G/DL (ref 12–17)
IMM GRANULOCYTES # BLD AUTO: 0.02 THOUSAND/UL (ref 0–0.2)
IMM GRANULOCYTES NFR BLD AUTO: 0 % (ref 0–2)
INR PPP: 1.23 (ref 0.84–1.19)
LYMPHOCYTES # BLD AUTO: 3.06 THOUSANDS/ΜL (ref 0.6–4.47)
LYMPHOCYTES NFR BLD AUTO: 33 % (ref 14–44)
MCH RBC QN AUTO: 33.4 PG (ref 26.8–34.3)
MCHC RBC AUTO-ENTMCNC: 33.6 G/DL (ref 31.4–37.4)
MCV RBC AUTO: 100 FL (ref 82–98)
MONOCYTES # BLD AUTO: 1.14 THOUSAND/ΜL (ref 0.17–1.22)
MONOCYTES NFR BLD AUTO: 12 % (ref 4–12)
NEUTROPHILS # BLD AUTO: 4.62 THOUSANDS/ΜL (ref 1.85–7.62)
NEUTS SEG NFR BLD AUTO: 51 % (ref 43–75)
NRBC BLD AUTO-RTO: 0 /100 WBCS
PLATELET # BLD AUTO: 277 THOUSANDS/UL (ref 149–390)
PMV BLD AUTO: 10 FL (ref 8.9–12.7)
POTASSIUM SERPL-SCNC: 4 MMOL/L (ref 3.5–5.3)
PROT SERPL-MCNC: 7.5 G/DL (ref 6.4–8.2)
PROTHROMBIN TIME: 15.7 SECONDS (ref 11.6–14.5)
RBC # BLD AUTO: 4.43 MILLION/UL (ref 3.88–5.62)
SODIUM SERPL-SCNC: 136 MMOL/L (ref 136–145)
WBC # BLD AUTO: 9.2 THOUSAND/UL (ref 4.31–10.16)

## 2019-10-22 PROCEDURE — 85025 COMPLETE CBC W/AUTO DIFF WBC: CPT

## 2019-10-22 PROCEDURE — 85610 PROTHROMBIN TIME: CPT

## 2019-10-22 PROCEDURE — 36415 COLL VENOUS BLD VENIPUNCTURE: CPT

## 2019-10-22 PROCEDURE — 80053 COMPREHEN METABOLIC PANEL: CPT

## 2019-10-22 RX ORDER — FAMOTIDINE 20 MG/1
TABLET, FILM COATED ORAL
Qty: 180 TABLET | Refills: 0 | Status: SHIPPED | OUTPATIENT
Start: 2019-10-22 | End: 2020-01-14

## 2019-10-25 ENCOUNTER — TELEPHONE (OUTPATIENT)
Dept: GASTROENTEROLOGY | Facility: MEDICAL CENTER | Age: 59
End: 2019-10-25

## 2019-10-25 NOTE — TELEPHONE ENCOUNTER
----- Message from Angella Dorman PA-C sent at 10/22/2019  2:06 PM EDT -----  The INR is elevated, his LFTs are stable overall but the total bilirubin number is slightly elevated  These are indicators of liver disease, please make sure he has a follow-up in the office to discuss further

## 2019-11-04 DIAGNOSIS — I10 ESSENTIAL HYPERTENSION: ICD-10-CM

## 2019-11-04 RX ORDER — AMLODIPINE BESYLATE 5 MG/1
TABLET ORAL
Qty: 180 TABLET | Refills: 0 | Status: SHIPPED | OUTPATIENT
Start: 2019-11-04 | End: 2020-01-28

## 2019-11-21 DIAGNOSIS — E03.9 ACQUIRED HYPOTHYROIDISM: ICD-10-CM

## 2019-11-21 RX ORDER — LEVOTHYROXINE SODIUM 0.07 MG/1
TABLET ORAL
Qty: 90 TABLET | Refills: 1 | Status: SHIPPED | OUTPATIENT
Start: 2019-11-21 | End: 2020-05-18

## 2019-11-26 ENCOUNTER — OFFICE VISIT (OUTPATIENT)
Dept: GASTROENTEROLOGY | Facility: MEDICAL CENTER | Age: 59
End: 2019-11-26
Payer: MEDICARE

## 2019-11-26 VITALS
WEIGHT: 196 LBS | TEMPERATURE: 97.6 F | HEIGHT: 73 IN | DIASTOLIC BLOOD PRESSURE: 90 MMHG | SYSTOLIC BLOOD PRESSURE: 140 MMHG | BODY MASS INDEX: 25.98 KG/M2

## 2019-11-26 DIAGNOSIS — R79.89 ELEVATED LFTS: ICD-10-CM

## 2019-11-26 DIAGNOSIS — K75.81 STEATOHEPATITIS: Primary | ICD-10-CM

## 2019-11-26 DIAGNOSIS — F10.10 ALCOHOL ABUSE: ICD-10-CM

## 2019-11-26 DIAGNOSIS — K74.00 HEPATIC FIBROSIS: ICD-10-CM

## 2019-11-26 DIAGNOSIS — K21.9 GASTROESOPHAGEAL REFLUX DISEASE WITHOUT ESOPHAGITIS: ICD-10-CM

## 2019-11-26 PROCEDURE — 99214 OFFICE O/P EST MOD 30 MIN: CPT | Performed by: PHYSICIAN ASSISTANT
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
## 2019-12-16 ENCOUNTER — HOSPITAL ENCOUNTER (OUTPATIENT)
Dept: RADIOLOGY | Facility: HOSPITAL | Age: 59
Discharge: HOME | End: 2019-12-16
Payer: MEDICARE

## 2019-12-16 ENCOUNTER — APPOINTMENT (OUTPATIENT)
Dept: LAB | Facility: HOSPITAL | Age: 59
End: 2019-12-16
Payer: MEDICARE

## 2019-12-16 DIAGNOSIS — K21.9 GASTROESOPHAGEAL REFLUX DISEASE WITHOUT ESOPHAGITIS: ICD-10-CM

## 2019-12-16 DIAGNOSIS — E03.9 ACQUIRED HYPOTHYROIDISM: ICD-10-CM

## 2019-12-16 DIAGNOSIS — I10 ESSENTIAL HYPERTENSION: ICD-10-CM

## 2019-12-16 DIAGNOSIS — F10.10 ALCOHOL ABUSE: ICD-10-CM

## 2019-12-16 DIAGNOSIS — R79.89 ELEVATED LFTS: ICD-10-CM

## 2019-12-16 DIAGNOSIS — Z12.5 SCREENING FOR PROSTATE CANCER: ICD-10-CM

## 2019-12-16 DIAGNOSIS — E78.2 MIXED HYPERLIPIDEMIA: ICD-10-CM

## 2019-12-16 DIAGNOSIS — K21.9 GERD WITHOUT ESOPHAGITIS: ICD-10-CM

## 2019-12-16 LAB
ALBUMIN SERPL BCP-MCNC: 3.7 G/DL (ref 3.5–5)
ALP SERPL-CCNC: 107 U/L (ref 46–116)
ALT SERPL W P-5'-P-CCNC: 64 U/L (ref 12–78)
ANION GAP SERPL CALCULATED.3IONS-SCNC: 10 MMOL/L (ref 4–13)
AST SERPL W P-5'-P-CCNC: 66 U/L (ref 5–45)
BASOPHILS # BLD AUTO: 0.06 THOUSANDS/ΜL (ref 0–0.1)
BASOPHILS NFR BLD AUTO: 1 % (ref 0–1)
BILIRUB SERPL-MCNC: 0.56 MG/DL (ref 0.2–1)
BUN SERPL-MCNC: 12 MG/DL (ref 5–25)
CALCIUM SERPL-MCNC: 8.9 MG/DL (ref 8.3–10.1)
CHLORIDE SERPL-SCNC: 105 MMOL/L (ref 100–108)
CHOLEST SERPL-MCNC: 124 MG/DL (ref 50–200)
CO2 SERPL-SCNC: 26 MMOL/L (ref 21–32)
CREAT SERPL-MCNC: 0.99 MG/DL (ref 0.6–1.3)
EOSINOPHIL # BLD AUTO: 0.23 THOUSAND/ΜL (ref 0–0.61)
EOSINOPHIL NFR BLD AUTO: 2 % (ref 0–6)
ERYTHROCYTE [DISTWIDTH] IN BLOOD BY AUTOMATED COUNT: 13.8 % (ref 11.6–15.1)
GFR SERPL CREATININE-BSD FRML MDRD: 82 ML/MIN/1.73SQ M
GLUCOSE P FAST SERPL-MCNC: 97 MG/DL (ref 65–99)
HCT VFR BLD AUTO: 43.3 % (ref 36.5–49.3)
HDLC SERPL-MCNC: 54 MG/DL
HGB BLD-MCNC: 14.5 G/DL (ref 12–17)
IMM GRANULOCYTES # BLD AUTO: 0.03 THOUSAND/UL (ref 0–0.2)
IMM GRANULOCYTES NFR BLD AUTO: 0 % (ref 0–2)
LDLC SERPL CALC-MCNC: 38 MG/DL (ref 0–100)
LYMPHOCYTES # BLD AUTO: 3.06 THOUSANDS/ΜL (ref 0.6–4.47)
LYMPHOCYTES NFR BLD AUTO: 31 % (ref 14–44)
MCH RBC QN AUTO: 33.5 PG (ref 26.8–34.3)
MCHC RBC AUTO-ENTMCNC: 33.5 G/DL (ref 31.4–37.4)
MCV RBC AUTO: 100 FL (ref 82–98)
MONOCYTES # BLD AUTO: 0.89 THOUSAND/ΜL (ref 0.17–1.22)
MONOCYTES NFR BLD AUTO: 9 % (ref 4–12)
NEUTROPHILS # BLD AUTO: 5.66 THOUSANDS/ΜL (ref 1.85–7.62)
NEUTS SEG NFR BLD AUTO: 57 % (ref 43–75)
NONHDLC SERPL-MCNC: 70 MG/DL
NRBC BLD AUTO-RTO: 0 /100 WBCS
PLATELET # BLD AUTO: 282 THOUSANDS/UL (ref 149–390)
PMV BLD AUTO: 10.8 FL (ref 8.9–12.7)
POTASSIUM SERPL-SCNC: 3.7 MMOL/L (ref 3.5–5.3)
PROT SERPL-MCNC: 7.7 G/DL (ref 6.4–8.2)
PSA SERPL-MCNC: 0.4 NG/ML (ref 0–4)
RBC # BLD AUTO: 4.33 MILLION/UL (ref 3.88–5.62)
SODIUM SERPL-SCNC: 141 MMOL/L (ref 136–145)
T4 FREE SERPL-MCNC: 0.99 NG/DL (ref 0.76–1.46)
TRIGL SERPL-MCNC: 162 MG/DL
TSH SERPL DL<=0.05 MIU/L-ACNC: 4.39 UIU/ML (ref 0.36–3.74)
WBC # BLD AUTO: 9.93 THOUSAND/UL (ref 4.31–10.16)

## 2019-12-16 PROCEDURE — 80061 LIPID PANEL: CPT

## 2019-12-16 PROCEDURE — 85025 COMPLETE CBC W/AUTO DIFF WBC: CPT

## 2019-12-16 PROCEDURE — 36415 COLL VENOUS BLD VENIPUNCTURE: CPT

## 2019-12-16 PROCEDURE — 84439 ASSAY OF FREE THYROXINE: CPT

## 2019-12-16 PROCEDURE — G0103 PSA SCREENING: HCPCS

## 2019-12-16 PROCEDURE — 84443 ASSAY THYROID STIM HORMONE: CPT

## 2019-12-16 PROCEDURE — 80053 COMPREHEN METABOLIC PANEL: CPT

## 2019-12-16 PROCEDURE — 76981 USE PARENCHYMA: CPT

## 2019-12-20 ENCOUNTER — TELEPHONE (OUTPATIENT)
Dept: GASTROENTEROLOGY | Facility: MEDICAL CENTER | Age: 59
End: 2019-12-20

## 2019-12-20 NOTE — TELEPHONE ENCOUNTER
----- Message from Fredi Pedroza PA-C sent at 12/20/2019  3:21 PM EST -----  The ultrasound for the liver was normal, good news  No scarring seen

## 2019-12-22 DIAGNOSIS — E78.2 MIXED HYPERLIPIDEMIA: ICD-10-CM

## 2019-12-23 RX ORDER — ATORVASTATIN CALCIUM 40 MG/1
TABLET, FILM COATED ORAL
Qty: 90 TABLET | Refills: 0 | Status: SHIPPED | OUTPATIENT
Start: 2019-12-23 | End: 2020-03-31

## 2019-12-30 DIAGNOSIS — E87.6 HYPOKALEMIA: ICD-10-CM

## 2019-12-30 RX ORDER — POTASSIUM CHLORIDE 750 MG/1
CAPSULE, EXTENDED RELEASE ORAL
Qty: 90 CAPSULE | Refills: 0 | Status: SHIPPED | OUTPATIENT
Start: 2019-12-30 | End: 2020-03-27 | Stop reason: SDUPTHER

## 2020-01-06 ENCOUNTER — TELEPHONE (OUTPATIENT)
Dept: GASTROENTEROLOGY | Facility: CLINIC | Age: 60
End: 2020-01-06

## 2020-01-06 NOTE — TELEPHONE ENCOUNTER
Please advise if patient should have any imaging done before his next appt  Patient had the imaging done that was ordered last visit

## 2020-01-06 NOTE — TELEPHONE ENCOUNTER
I don't think he needs imaging right now  However he should get the AFP, blood work that was ordered  If this is elevated would then consider imaging    Christal Brooks

## 2020-01-06 NOTE — TELEPHONE ENCOUNTER
Patients GI provider:  Dr Sid Romero     Number to return call: [phone]    Reason for call: Pt calling to check if he needs an [address] [facility] [facility] before his follow up appt on 2/12   Please advise     Scheduled procedure/appointment date if applicable: Apt/procedure - n/a

## 2020-01-13 ENCOUNTER — OFFICE VISIT (OUTPATIENT)
Dept: FAMILY MEDICINE CLINIC | Facility: CLINIC | Age: 60
End: 2020-01-13
Payer: COMMERCIAL

## 2020-01-13 VITALS
SYSTOLIC BLOOD PRESSURE: 138 MMHG | HEIGHT: 73 IN | DIASTOLIC BLOOD PRESSURE: 82 MMHG | BODY MASS INDEX: 26.32 KG/M2 | WEIGHT: 198.6 LBS

## 2020-01-13 DIAGNOSIS — K75.81 STEATOHEPATITIS: ICD-10-CM

## 2020-01-13 DIAGNOSIS — D12.4 ADENOMATOUS POLYP OF DESCENDING COLON: ICD-10-CM

## 2020-01-13 DIAGNOSIS — E78.2 MIXED HYPERLIPIDEMIA: ICD-10-CM

## 2020-01-13 DIAGNOSIS — K74.00 HEPATIC FIBROSIS: ICD-10-CM

## 2020-01-13 DIAGNOSIS — J42 CHRONIC BRONCHITIS, UNSPECIFIED CHRONIC BRONCHITIS TYPE (HCC): ICD-10-CM

## 2020-01-13 DIAGNOSIS — E66.3 OVERWEIGHT (BMI 25.0-29.9): ICD-10-CM

## 2020-01-13 DIAGNOSIS — I10 ESSENTIAL HYPERTENSION: Primary | ICD-10-CM

## 2020-01-13 DIAGNOSIS — E03.9 ACQUIRED HYPOTHYROIDISM: ICD-10-CM

## 2020-01-13 PROCEDURE — 99214 OFFICE O/P EST MOD 30 MIN: CPT | Performed by: FAMILY MEDICINE

## 2020-01-13 PROCEDURE — 3075F SYST BP GE 130 - 139MM HG: CPT | Performed by: FAMILY MEDICINE

## 2020-01-13 PROCEDURE — 3008F BODY MASS INDEX DOCD: CPT | Performed by: FAMILY MEDICINE

## 2020-01-13 PROCEDURE — 3079F DIAST BP 80-89 MM HG: CPT | Performed by: FAMILY MEDICINE

## 2020-01-13 NOTE — PATIENT INSTRUCTIONS

## 2020-01-13 NOTE — ASSESSMENT & PLAN NOTE
Patient weight is stable Patient will continue current diet and exercise plan and will see me in 6 months

## 2020-01-13 NOTE — PROGRESS NOTES
Assessment/Plan:    Steatohepatitis  followup with GI as dircted    Overweight (BMI 25 0-29  9)  Patient weight is stable Patient will continue current diet and exercise plan and will see me in 6 months    Mixed hyperlipidemia  Last lipids from 12/2019 are at goal continue curretn meds and follwoup in 6 months    Hepatic fibrosis  followup with Gi in 2/2020     Essential hypertension  Patient blood pressure is stable Patient to continue with current meds and followup in 6 months    Chronic obstructive pulmonary disease ([address] )  Breathing is stable Patient is up to date with immunizations Followup in 6months    Adenomatous polyp of descending colon  Repeat colonoscopy in 2021    Acquired hypothyroidism  Thyroid numbers are stable continue current meds and follwoup in 6 monhts       Diagnoses and all orders for this visit:    Essential hypertension  -     Comprehensive metabolic panel; Future  -     Lipid panel; Future    Mixed hyperlipidemia  -     Comprehensive metabolic panel; Future  -     Lipid panel; Future    Overweight (BMI 25 0-29  9)    Acquired hypothyroidism  -     TSH, 3rd generation with Free T4 reflex; Future    Chronic bronchitis, unspecified chronic bronchitis type (HCC)    Adenomatous polyp of descending colon    Hepatic fibrosis  -     Comprehensive metabolic panel; Future    Steatohepatitis          Subjective:   Chief Complaint   Patient presents with    Hypothyroidism    Hypertension    Hyperlipidemia     no refills needed           Patient ID: [facility] is a 60 y o  male      Patient is here for followup of hypertension, hyperlipidemia overweight , liver fibrosis, COPD history of colon polyps hypothryoidism Patient is overall doing well Patient blood presusre is stable Patien tlast labs were reviewed Patient is not drinking any alcohol He has followup with Gi for his liver fibrosis and will also need colonscoopy in 2021 per last GI note Patient weight is stable Patient isnot having any issues iwht his breathing Patient labs are good       The following portions of the patient's history were reviewed and updated as appropriate: allergies, current medications, past medical history, past social history and problem list BMI Counseling: Body mass index is 26 2 kg/m²  The BMI is above normal  Nutrition recommendations include decreasing portion sizes, encouraging healthy choices of fruits and vegetables, consuming healthier snacks, moderation in carbohydrate intake and increasing intake of lean protein  Exercise recommendations include exercising 3-5 times per week       Review of Systems   Constitutional: Negative for fatigue, fever and unexpected weight change  HENT: Negative for congestion, sinus pain and trouble swallowing  Eyes: Negative for discharge and visual disturbance  Respiratory: Negative for cough, chest tightness, shortness of breath and wheezing  Cardiovascular: Negative for chest pain, palpitations and leg swelling  Gastrointestinal: Negative for abdominal pain, blood in stool, constipation, diarrhea, nausea and vomiting  Genitourinary: Negative for difficulty urinating, dysuria, frequency and hematuria  Musculoskeletal: Negative for arthralgias, gait problem and joint swelling  Skin: Negative for rash and wound  Allergic/Immunologic: Negative for environmental allergies and food allergies  Neurological: Negative for dizziness, syncope, weakness, numbness and headaches  Hematological: Negative for adenopathy  Does not bruise/bleed easily  Psychiatric/Behavioral: Negative for confusion, decreased concentration and sleep disturbance  The patient is not nervous/anxious  Objective:      /82   Ht 6' 1" (1 854 m)   Wt 90 1 kg (198 lb 9 6 oz)   BMI 26 20 kg/m²          Physical Exam   Constitutional: He is oriented to person, place, and time  He appears well-developed and well-nourished  HENT:   Head: Normocephalic and atraumatic     Right Ear: Hearing, tympanic membrane and external ear normal    Left Ear: Hearing, tympanic membrane and external ear normal    Eyes: Pupils are equal, round, and reactive to light  Conjunctivae and EOM are normal    Neck: Neck supple  No thyromegaly present  Cardiovascular: Normal rate and normal heart sounds  Pulmonary/Chest: Effort normal and breath sounds normal  He has no wheezes  He has no rales  Abdominal: Soft  Bowel sounds are normal  He exhibits no distension  There is no tenderness  Musculoskeletal: He exhibits no edema or tenderness  Lymphadenopathy:     He has no cervical adenopathy  Neurological: He is alert and oriented to person, place, and time  No cranial nerve deficit  Coordination normal    Skin: Skin is warm and dry  No rash noted  Psychiatric: He has a normal mood and affect  His behavior is normal  Judgment and thought content normal    Nursing note and vitals reviewed

## 2020-01-14 DIAGNOSIS — K21.9 GASTROESOPHAGEAL REFLUX DISEASE WITHOUT ESOPHAGITIS: ICD-10-CM

## 2020-01-14 RX ORDER — FAMOTIDINE 20 MG/1
TABLET, FILM COATED ORAL
Qty: 180 TABLET | Refills: 0 | Status: SHIPPED | OUTPATIENT
Start: 2020-01-14 | End: 2020-04-14

## 2020-01-28 DIAGNOSIS — I10 ESSENTIAL HYPERTENSION: ICD-10-CM

## 2020-01-28 RX ORDER — AMLODIPINE BESYLATE 5 MG/1
TABLET ORAL
Qty: 180 TABLET | Refills: 0 | Status: SHIPPED | OUTPATIENT
Start: 2020-01-28 | End: 2020-02-13 | Stop reason: CLARIF

## 2020-02-05 ENCOUNTER — APPOINTMENT (OUTPATIENT)
Dept: LAB | Facility: HOSPITAL | Age: 60
End: 2020-02-05
Payer: COMMERCIAL

## 2020-02-05 DIAGNOSIS — K75.81 STEATOHEPATITIS: ICD-10-CM

## 2020-02-05 DIAGNOSIS — K74.00 HEPATIC FIBROSIS: ICD-10-CM

## 2020-02-05 DIAGNOSIS — F10.10 ALCOHOL ABUSE: ICD-10-CM

## 2020-02-05 DIAGNOSIS — R79.89 ELEVATED LFTS: ICD-10-CM

## 2020-02-05 LAB
AFP-TM SERPL-MCNC: 3.5 NG/ML (ref 0.5–8)
ALBUMIN SERPL BCP-MCNC: 3.3 G/DL (ref 3.5–5)
ALP SERPL-CCNC: 103 U/L (ref 46–116)
ALT SERPL W P-5'-P-CCNC: 59 U/L (ref 12–78)
ANION GAP SERPL CALCULATED.3IONS-SCNC: 8 MMOL/L (ref 4–13)
AST SERPL W P-5'-P-CCNC: 67 U/L (ref 5–45)
BILIRUB SERPL-MCNC: 1 MG/DL (ref 0.2–1)
BUN SERPL-MCNC: 12 MG/DL (ref 5–25)
CALCIUM SERPL-MCNC: 8.5 MG/DL (ref 8.3–10.1)
CHLORIDE SERPL-SCNC: 105 MMOL/L (ref 100–108)
CO2 SERPL-SCNC: 29 MMOL/L (ref 21–32)
CREAT SERPL-MCNC: 1.17 MG/DL (ref 0.6–1.3)
GFR SERPL CREATININE-BSD FRML MDRD: 67 ML/MIN/1.73SQ M
GLUCOSE P FAST SERPL-MCNC: 100 MG/DL (ref 65–99)
INR PPP: 1.02 (ref 0.84–1.19)
POTASSIUM SERPL-SCNC: 3.7 MMOL/L (ref 3.5–5.3)
PROT SERPL-MCNC: 7.7 G/DL (ref 6.4–8.2)
PROTHROMBIN TIME: 13.5 SECONDS (ref 11.6–14.5)
SODIUM SERPL-SCNC: 142 MMOL/L (ref 136–145)

## 2020-02-05 PROCEDURE — 36415 COLL VENOUS BLD VENIPUNCTURE: CPT

## 2020-02-05 PROCEDURE — 85610 PROTHROMBIN TIME: CPT

## 2020-02-05 PROCEDURE — 82105 ALPHA-FETOPROTEIN SERUM: CPT

## 2020-02-05 PROCEDURE — 80053 COMPREHEN METABOLIC PANEL: CPT

## 2020-02-06 ENCOUNTER — TELEPHONE (OUTPATIENT)
Dept: GASTROENTEROLOGY | Facility: MEDICAL CENTER | Age: 60
End: 2020-02-06

## 2020-02-10 ENCOUNTER — OFFICE VISIT (OUTPATIENT)
Dept: GASTROENTEROLOGY | Facility: MEDICAL CENTER | Age: 60
End: 2020-02-10
Payer: COMMERCIAL

## 2020-02-10 VITALS
WEIGHT: 195 LBS | DIASTOLIC BLOOD PRESSURE: 76 MMHG | TEMPERATURE: 97.8 F | SYSTOLIC BLOOD PRESSURE: 130 MMHG | BODY MASS INDEX: 25.84 KG/M2 | HEART RATE: 81 BPM | HEIGHT: 73 IN

## 2020-02-10 DIAGNOSIS — F10.11 HISTORY OF ALCOHOL ABUSE: ICD-10-CM

## 2020-02-10 DIAGNOSIS — K75.81 STEATOHEPATITIS: Primary | ICD-10-CM

## 2020-02-10 DIAGNOSIS — K21.9 GASTROESOPHAGEAL REFLUX DISEASE WITHOUT ESOPHAGITIS: ICD-10-CM

## 2020-02-10 DIAGNOSIS — D12.4 ADENOMATOUS POLYP OF DESCENDING COLON: ICD-10-CM

## 2020-02-10 PROCEDURE — 99214 OFFICE O/P EST MOD 30 MIN: CPT | Performed by: PHYSICIAN ASSISTANT

## 2020-02-10 PROCEDURE — 1036F TOBACCO NON-USER: CPT | Performed by: PHYSICIAN ASSISTANT

## 2020-02-10 PROCEDURE — 3078F DIAST BP <80 MM HG: CPT | Performed by: PHYSICIAN ASSISTANT

## 2020-02-10 PROCEDURE — 3075F SYST BP GE 130 - 139MM HG: CPT | Performed by: PHYSICIAN ASSISTANT

## 2020-02-10 PROCEDURE — 3008F BODY MASS INDEX DOCD: CPT | Performed by: PHYSICIAN ASSISTANT
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
## 2020-02-13 DIAGNOSIS — I10 ESSENTIAL HYPERTENSION: Primary | ICD-10-CM

## 2020-02-13 RX ORDER — AMLODIPINE BESYLATE 10 MG/1
10 TABLET ORAL DAILY
Qty: 90 TABLET | Refills: 1
Start: 2020-02-13 | End: 2020-03-27 | Stop reason: SDUPTHER

## 2020-03-27 DIAGNOSIS — I10 ESSENTIAL HYPERTENSION: ICD-10-CM

## 2020-03-27 DIAGNOSIS — E87.6 HYPOKALEMIA: ICD-10-CM

## 2020-03-27 RX ORDER — AMLODIPINE BESYLATE 10 MG/1
10 TABLET ORAL DAILY
Qty: 90 TABLET | Refills: 1 | Status: SHIPPED | OUTPATIENT
Start: 2020-03-27 | End: 2020-09-11

## 2020-03-27 RX ORDER — POTASSIUM CHLORIDE 750 MG/1
10 CAPSULE, EXTENDED RELEASE ORAL DAILY
Qty: 90 CAPSULE | Refills: 0 | Status: SHIPPED | OUTPATIENT
Start: 2020-03-27 | End: 2020-06-23

## 2020-03-31 DIAGNOSIS — E78.2 MIXED HYPERLIPIDEMIA: ICD-10-CM

## 2020-03-31 RX ORDER — ATORVASTATIN CALCIUM 40 MG/1
TABLET, FILM COATED ORAL
Qty: 90 TABLET | Refills: 0 | Status: SHIPPED | OUTPATIENT
Start: 2020-03-31 | End: 2020-06-23

## 2020-04-14 DIAGNOSIS — K21.9 GASTROESOPHAGEAL REFLUX DISEASE WITHOUT ESOPHAGITIS: ICD-10-CM

## 2020-04-14 RX ORDER — FAMOTIDINE 20 MG/1
TABLET, FILM COATED ORAL
Qty: 180 TABLET | Refills: 0 | Status: SHIPPED | OUTPATIENT
Start: 2020-04-14 | End: 2020-07-08

## 2020-04-23 ENCOUNTER — OFFICE VISIT (OUTPATIENT)
Dept: FAMILY MEDICINE CLINIC | Facility: CLINIC | Age: 60
End: 2020-04-23
Payer: COMMERCIAL

## 2020-04-23 VITALS
TEMPERATURE: 98.3 F | WEIGHT: 206 LBS | HEIGHT: 74 IN | BODY MASS INDEX: 26.44 KG/M2 | DIASTOLIC BLOOD PRESSURE: 70 MMHG | SYSTOLIC BLOOD PRESSURE: 130 MMHG

## 2020-04-23 DIAGNOSIS — L03.116 CELLULITIS OF LEFT LOWER EXTREMITY: Primary | ICD-10-CM

## 2020-04-23 PROCEDURE — 3078F DIAST BP <80 MM HG: CPT | Performed by: FAMILY MEDICINE

## 2020-04-23 PROCEDURE — 3008F BODY MASS INDEX DOCD: CPT | Performed by: FAMILY MEDICINE

## 2020-04-23 PROCEDURE — 3075F SYST BP GE 130 - 139MM HG: CPT | Performed by: FAMILY MEDICINE

## 2020-04-23 PROCEDURE — 99213 OFFICE O/P EST LOW 20 MIN: CPT | Performed by: FAMILY MEDICINE

## 2020-04-23 PROCEDURE — 1036F TOBACCO NON-USER: CPT | Performed by: FAMILY MEDICINE

## 2020-04-23 RX ORDER — CLINDAMYCIN HYDROCHLORIDE 300 MG/1
300 CAPSULE ORAL 3 TIMES DAILY
Qty: 30 CAPSULE | Refills: 0 | Status: SHIPPED | OUTPATIENT
Start: 2020-04-23 | End: 2020-05-03

## 2020-05-11 DIAGNOSIS — I10 ESSENTIAL HYPERTENSION: Primary | ICD-10-CM

## 2020-05-11 RX ORDER — FUROSEMIDE 20 MG/1
20 TABLET ORAL DAILY
Qty: 30 TABLET | Refills: 2 | Status: SHIPPED | OUTPATIENT
Start: 2020-05-11 | End: 2020-08-03

## 2020-05-18 DIAGNOSIS — E03.9 ACQUIRED HYPOTHYROIDISM: ICD-10-CM

## 2020-05-18 RX ORDER — LEVOTHYROXINE SODIUM 0.07 MG/1
TABLET ORAL
Qty: 90 TABLET | Refills: 1 | Status: SHIPPED | OUTPATIENT
Start: 2020-05-18 | End: 2020-12-08

## 2020-06-15 ENCOUNTER — APPOINTMENT (OUTPATIENT)
Dept: LAB | Facility: HOSPITAL | Age: 60
End: 2020-06-15
Payer: COMMERCIAL

## 2020-06-15 DIAGNOSIS — I10 ESSENTIAL HYPERTENSION: ICD-10-CM

## 2020-06-15 DIAGNOSIS — E78.2 MIXED HYPERLIPIDEMIA: ICD-10-CM

## 2020-06-15 DIAGNOSIS — K74.00 HEPATIC FIBROSIS: ICD-10-CM

## 2020-06-15 DIAGNOSIS — E03.9 ACQUIRED HYPOTHYROIDISM: ICD-10-CM

## 2020-06-15 LAB
ALBUMIN SERPL BCP-MCNC: 3.7 G/DL (ref 3.5–5)
ALP SERPL-CCNC: 79 U/L (ref 46–116)
ALT SERPL W P-5'-P-CCNC: 34 U/L (ref 12–78)
ANION GAP SERPL CALCULATED.3IONS-SCNC: 9 MMOL/L (ref 4–13)
AST SERPL W P-5'-P-CCNC: 27 U/L (ref 5–45)
BILIRUB SERPL-MCNC: 0.29 MG/DL (ref 0.2–1)
BUN SERPL-MCNC: 13 MG/DL (ref 5–25)
CALCIUM SERPL-MCNC: 8.8 MG/DL (ref 8.3–10.1)
CHLORIDE SERPL-SCNC: 104 MMOL/L (ref 100–108)
CHOLEST SERPL-MCNC: 119 MG/DL (ref 50–200)
CO2 SERPL-SCNC: 28 MMOL/L (ref 21–32)
CREAT SERPL-MCNC: 1.08 MG/DL (ref 0.6–1.3)
GFR SERPL CREATININE-BSD FRML MDRD: 74 ML/MIN/1.73SQ M
GLUCOSE SERPL-MCNC: 105 MG/DL (ref 65–140)
HDLC SERPL-MCNC: 74 MG/DL
LDLC SERPL CALC-MCNC: 35 MG/DL (ref 0–100)
NONHDLC SERPL-MCNC: 45 MG/DL
POTASSIUM SERPL-SCNC: 3.8 MMOL/L (ref 3.5–5.3)
PROT SERPL-MCNC: 7.4 G/DL (ref 6.4–8.2)
SODIUM SERPL-SCNC: 141 MMOL/L (ref 136–145)
T4 FREE SERPL-MCNC: 0.96 NG/DL (ref 0.76–1.46)
TRIGL SERPL-MCNC: 49 MG/DL
TSH SERPL DL<=0.05 MIU/L-ACNC: 4.03 UIU/ML (ref 0.36–3.74)

## 2020-06-15 PROCEDURE — 80061 LIPID PANEL: CPT

## 2020-06-15 PROCEDURE — 84439 ASSAY OF FREE THYROXINE: CPT

## 2020-06-15 PROCEDURE — 36415 COLL VENOUS BLD VENIPUNCTURE: CPT

## 2020-06-15 PROCEDURE — 80053 COMPREHEN METABOLIC PANEL: CPT

## 2020-06-15 PROCEDURE — 84443 ASSAY THYROID STIM HORMONE: CPT

## 2020-06-17 ENCOUNTER — OFFICE VISIT (OUTPATIENT)
Dept: FAMILY MEDICINE CLINIC | Facility: CLINIC | Age: 60
End: 2020-06-17
Payer: COMMERCIAL

## 2020-06-17 VITALS
WEIGHT: 196 LBS | HEIGHT: 73 IN | DIASTOLIC BLOOD PRESSURE: 80 MMHG | SYSTOLIC BLOOD PRESSURE: 148 MMHG | BODY MASS INDEX: 25.98 KG/M2 | TEMPERATURE: 98.4 F

## 2020-06-17 DIAGNOSIS — H61.23 CERUMEN DEBRIS ON TYMPANIC MEMBRANE OF BOTH EARS: Primary | ICD-10-CM

## 2020-06-17 PROCEDURE — 1036F TOBACCO NON-USER: CPT | Performed by: FAMILY MEDICINE

## 2020-06-17 PROCEDURE — 3079F DIAST BP 80-89 MM HG: CPT | Performed by: FAMILY MEDICINE

## 2020-06-17 PROCEDURE — 99213 OFFICE O/P EST LOW 20 MIN: CPT | Performed by: FAMILY MEDICINE

## 2020-06-17 PROCEDURE — 3008F BODY MASS INDEX DOCD: CPT | Performed by: FAMILY MEDICINE

## 2020-06-17 PROCEDURE — 3077F SYST BP >= 140 MM HG: CPT | Performed by: FAMILY MEDICINE

## 2020-06-23 DIAGNOSIS — E87.6 HYPOKALEMIA: ICD-10-CM

## 2020-06-23 DIAGNOSIS — E78.2 MIXED HYPERLIPIDEMIA: ICD-10-CM

## 2020-06-23 RX ORDER — POTASSIUM CHLORIDE 750 MG/1
CAPSULE, EXTENDED RELEASE ORAL
Qty: 90 CAPSULE | Refills: 0 | Status: SHIPPED | OUTPATIENT
Start: 2020-06-23 | End: 2020-09-23

## 2020-06-23 RX ORDER — ATORVASTATIN CALCIUM 40 MG/1
TABLET, FILM COATED ORAL
Qty: 90 TABLET | Refills: 0 | Status: SHIPPED | OUTPATIENT
Start: 2020-06-23 | End: 2020-09-23

## 2020-07-08 ENCOUNTER — OFFICE VISIT (OUTPATIENT)
Dept: FAMILY MEDICINE CLINIC | Facility: CLINIC | Age: 60
End: 2020-07-08
Payer: COMMERCIAL

## 2020-07-08 VITALS
TEMPERATURE: 98.1 F | SYSTOLIC BLOOD PRESSURE: 138 MMHG | WEIGHT: 198 LBS | DIASTOLIC BLOOD PRESSURE: 82 MMHG | BODY MASS INDEX: 26.24 KG/M2 | HEIGHT: 73 IN

## 2020-07-08 DIAGNOSIS — D12.4 ADENOMATOUS POLYP OF DESCENDING COLON: ICD-10-CM

## 2020-07-08 DIAGNOSIS — K75.81 STEATOHEPATITIS: ICD-10-CM

## 2020-07-08 DIAGNOSIS — J42 CHRONIC BRONCHITIS, UNSPECIFIED CHRONIC BRONCHITIS TYPE (HCC): ICD-10-CM

## 2020-07-08 DIAGNOSIS — I10 ESSENTIAL HYPERTENSION: Primary | ICD-10-CM

## 2020-07-08 DIAGNOSIS — E03.9 ACQUIRED HYPOTHYROIDISM: ICD-10-CM

## 2020-07-08 DIAGNOSIS — K74.00 HEPATIC FIBROSIS: ICD-10-CM

## 2020-07-08 DIAGNOSIS — E78.2 MIXED HYPERLIPIDEMIA: ICD-10-CM

## 2020-07-08 DIAGNOSIS — E66.3 OVERWEIGHT: ICD-10-CM

## 2020-07-08 PROBLEM — H61.23 CERUMEN DEBRIS ON TYMPANIC MEMBRANE OF BOTH EARS: Status: RESOLVED | Noted: 2020-06-17 | Resolved: 2020-07-08

## 2020-07-08 PROBLEM — R74.8 ELEVATED LIVER ENZYMES: Status: RESOLVED | Noted: 2017-08-02 | Resolved: 2020-07-08

## 2020-07-08 PROBLEM — L03.116 CELLULITIS OF LEFT LOWER EXTREMITY: Status: RESOLVED | Noted: 2020-04-23 | Resolved: 2020-07-08

## 2020-07-08 PROCEDURE — 3075F SYST BP GE 130 - 139MM HG: CPT | Performed by: FAMILY MEDICINE

## 2020-07-08 PROCEDURE — 1036F TOBACCO NON-USER: CPT | Performed by: FAMILY MEDICINE

## 2020-07-08 PROCEDURE — 3079F DIAST BP 80-89 MM HG: CPT | Performed by: FAMILY MEDICINE

## 2020-07-08 PROCEDURE — 3008F BODY MASS INDEX DOCD: CPT | Performed by: FAMILY MEDICINE

## 2020-07-08 PROCEDURE — 99214 OFFICE O/P EST MOD 30 MIN: CPT | Performed by: FAMILY MEDICINE

## 2020-07-08 NOTE — ASSESSMENT & PLAN NOTE
BP is stable on meds Patient is tolerating meds Patient to conitnue current care and followup in 6 months

## 2020-07-08 NOTE — ASSESSMENT & PLAN NOTE
Lipids are stable patient to conitnue curretn care his Jne labs showed LDL is at goal Patient to continue meds and see me in 6 months

## 2020-07-08 NOTE — PROGRESS NOTES
Assessment/Plan:    Hepatic fibrosis  Liver functions are improved Patient to continue current care Patient to see me in 6 months keep GI followup and continue to abstain from alcohol    Chronic obstructive pulmonary disease ([address] )  Quit smoking like 10 yrs ago Patient does not use any inhalers Patient is active and has no cough or shortness of breath    Adenomatous polyp of descending colon  Due for colonoscopy next year    Essential hypertension  BP is stable on meds Patient is tolerating meds Patient to [facility] current care and followup in 6 months    Mixed hyperlipidemia  Lipids are stable patient to conitnue curretn care his Jne labs showed LDL is at goal Patient to continue meds and see me in 6 months    Acquired hypothyroidism  Labs are stable Patient to [facility] current meds and recheck labs in 12/2020    Steatohepatitis  followup with GI avoid alcohol    Overweight  Weight is stable continue [facility]       Diagnoses and all orders for this visit:    Essential hypertension  -     Comprehensive metabolic panel; Future  -     Lipid panel; Future    Mixed hyperlipidemia  -     Comprehensive metabolic panel; Future  -     Lipid panel; Future    Acquired hypothyroidism  -     TSH, 3rd generation with Free T4 reflex; Future    Steatohepatitis    Overweight    Chronic bronchitis, unspecified chronic bronchitis type (HCC)    Hepatic fibrosis    Adenomatous polyp of descending colon          Subjective:   Chief Complaint   Patient presents with    GERD    Hypothyroidism    Hyperlipidemia    Hypertension     no refills needed           Patient ID: Mikel Sahu is a 63 y o  male      Patient ishere for follwoup of hypertenison hyperlipidmeia hypothyroidiism his weight fatty liver diseas and history of colon polyps Patient is doing well Patient has no new concerns patient needs to see eye doctor He had lasik in his 42's and notes his left eye is starting to become blurry His labs from June 2020 were reviwed and looked good His liver tests are normla He has fatty shantel disease and follows up with GI patient is tolerating all medication without any issues Patient is due for colonoscopy in 2021 Patient weight is stable      The following portions of the patient's history were reviewed and updated as appropriate: allergies, current medications, past family history, past medical history, past social history, past surgical history and problem list     Review of Systems   Constitutional: Negative for fatigue, fever and unexpected weight change  HENT: Negative for congestion, sinus pain and trouble swallowing  Eyes: Negative for discharge and visual disturbance  Respiratory: Negative for cough, chest tightness, shortness of breath and wheezing  Cardiovascular: Negative for chest pain, palpitations and leg swelling  Gastrointestinal: Negative for abdominal pain, blood in stool, constipation, diarrhea, nausea and vomiting  Genitourinary: Negative for difficulty urinating, dysuria, frequency and hematuria  Musculoskeletal: Negative for arthralgias, gait problem and joint swelling  Skin: Negative for rash and wound  Allergic/Immunologic: Negative for environmental allergies and food allergies  Neurological: Negative for dizziness, syncope, weakness, numbness and headaches  Hematological: Negative for adenopathy  Does not bruise/bleed easily  Psychiatric/Behavioral: Negative for confusion, decreased concentration and sleep disturbance  The patient is not nervous/anxious  Objective:      /82   Temp 98 1 °F (36 7 °C)   Ht 6' 1" (1 854 m)   Wt 89 8 kg (198 lb)   BMI 26 12 kg/m²          Physical Exam   Constitutional: He is oriented to person, place, and time  He appears well-developed and well-nourished  HENT:   Head: Normocephalic and atraumatic     Right Ear: Hearing, tympanic membrane and external ear normal    Left Ear: Hearing, tympanic membrane and external ear normal    Eyes: Pupils are equal, round, and reactive to light  Conjunctivae and EOM are normal    Neck: Neck supple  No thyromegaly present  Cardiovascular: Normal rate and normal heart sounds  Trace edema with venous stasis changes   Pulmonary/Chest: Effort normal and breath sounds normal  He has no wheezes  He has no rales  Abdominal: Soft  Bowel sounds are normal  He exhibits no distension  There is no tenderness  Musculoskeletal: He exhibits no edema or tenderness  Lymphadenopathy:     He has no cervical adenopathy  Neurological: He is alert and oriented to person, place, and time  No cranial nerve deficit  Coordination normal    Skin: Skin is warm and dry  No rash noted  Psychiatric: He has a normal mood and affect  His behavior is normal  Judgment and thought content normal    Nursing note and vitals reviewed

## 2020-07-08 NOTE — PATIENT INSTRUCTIONS

## 2020-07-08 NOTE — ASSESSMENT & PLAN NOTE
Quit smoking like 10 yrs ago Patient does not use any inhalers Patient is active and has no cough or shortness of breath

## 2020-07-08 NOTE — ASSESSMENT & PLAN NOTE
Liver functions are improved Patient to continue current care Patient to see me in 6 months keep GI followup and continue to abstain from alcohol

## 2020-08-03 DIAGNOSIS — I10 ESSENTIAL HYPERTENSION: ICD-10-CM

## 2020-08-03 RX ORDER — FUROSEMIDE 20 MG/1
TABLET ORAL
Qty: 90 TABLET | Refills: 0 | Status: SHIPPED | OUTPATIENT
Start: 2020-08-03 | End: 2020-10-28

## 2020-08-07 ENCOUNTER — APPOINTMENT (OUTPATIENT)
Dept: LAB | Facility: HOSPITAL | Age: 60
End: 2020-08-07
Payer: COMMERCIAL

## 2020-08-07 DIAGNOSIS — K75.81 STEATOHEPATITIS: ICD-10-CM

## 2020-08-07 LAB
ALBUMIN SERPL BCP-MCNC: 3.2 G/DL (ref 3.5–5)
ALP SERPL-CCNC: 91 U/L (ref 46–116)
ALT SERPL W P-5'-P-CCNC: 53 U/L (ref 12–78)
ANION GAP SERPL CALCULATED.3IONS-SCNC: 12 MMOL/L (ref 4–13)
AST SERPL W P-5'-P-CCNC: 48 U/L (ref 5–45)
BASOPHILS # BLD AUTO: 0.08 THOUSANDS/ΜL (ref 0–0.1)
BASOPHILS NFR BLD AUTO: 1 % (ref 0–1)
BILIRUB SERPL-MCNC: 0.56 MG/DL (ref 0.2–1)
BUN SERPL-MCNC: 10 MG/DL (ref 5–25)
CALCIUM SERPL-MCNC: 8.4 MG/DL (ref 8.3–10.1)
CHLORIDE SERPL-SCNC: 103 MMOL/L (ref 100–108)
CO2 SERPL-SCNC: 25 MMOL/L (ref 21–32)
CREAT SERPL-MCNC: 1.04 MG/DL (ref 0.6–1.3)
EOSINOPHIL # BLD AUTO: 0.36 THOUSAND/ΜL (ref 0–0.61)
EOSINOPHIL NFR BLD AUTO: 3 % (ref 0–6)
ERYTHROCYTE [DISTWIDTH] IN BLOOD BY AUTOMATED COUNT: 17 % (ref 11.6–15.1)
GFR SERPL CREATININE-BSD FRML MDRD: 77 ML/MIN/1.73SQ M
GLUCOSE SERPL-MCNC: 96 MG/DL (ref 65–140)
HCT VFR BLD AUTO: 38.9 % (ref 36.5–49.3)
HGB BLD-MCNC: 13 G/DL (ref 12–17)
IMM GRANULOCYTES # BLD AUTO: 0.04 THOUSAND/UL (ref 0–0.2)
IMM GRANULOCYTES NFR BLD AUTO: 0 % (ref 0–2)
INR PPP: 1.12 (ref 0.84–1.19)
LYMPHOCYTES # BLD AUTO: 4.09 THOUSANDS/ΜL (ref 0.6–4.47)
LYMPHOCYTES NFR BLD AUTO: 38 % (ref 14–44)
MCH RBC QN AUTO: 32.3 PG (ref 26.8–34.3)
MCHC RBC AUTO-ENTMCNC: 33.4 G/DL (ref 31.4–37.4)
MCV RBC AUTO: 97 FL (ref 82–98)
MONOCYTES # BLD AUTO: 1.12 THOUSAND/ΜL (ref 0.17–1.22)
MONOCYTES NFR BLD AUTO: 10 % (ref 4–12)
NEUTROPHILS # BLD AUTO: 5.03 THOUSANDS/ΜL (ref 1.85–7.62)
NEUTS SEG NFR BLD AUTO: 48 % (ref 43–75)
NRBC BLD AUTO-RTO: 0 /100 WBCS
PLATELET # BLD AUTO: 265 THOUSANDS/UL (ref 149–390)
PMV BLD AUTO: 9.9 FL (ref 8.9–12.7)
POTASSIUM SERPL-SCNC: 3.7 MMOL/L (ref 3.5–5.3)
PROT SERPL-MCNC: 7.3 G/DL (ref 6.4–8.2)
PROTHROMBIN TIME: 14.2 SECONDS (ref 11.6–14.5)
RBC # BLD AUTO: 4.02 MILLION/UL (ref 3.88–5.62)
SODIUM SERPL-SCNC: 140 MMOL/L (ref 136–145)
WBC # BLD AUTO: 10.72 THOUSAND/UL (ref 4.31–10.16)

## 2020-08-07 PROCEDURE — 85610 PROTHROMBIN TIME: CPT

## 2020-08-07 PROCEDURE — 36415 COLL VENOUS BLD VENIPUNCTURE: CPT

## 2020-08-07 PROCEDURE — 85025 COMPLETE CBC W/AUTO DIFF WBC: CPT

## 2020-08-07 PROCEDURE — 80053 COMPREHEN METABOLIC PANEL: CPT

## 2020-08-31 ENCOUNTER — OFFICE VISIT (OUTPATIENT)
Dept: GASTROENTEROLOGY | Facility: MEDICAL CENTER | Age: 60
End: 2020-08-31
Payer: COMMERCIAL

## 2020-08-31 VITALS
WEIGHT: 196 LBS | HEIGHT: 74 IN | TEMPERATURE: 98.8 F | SYSTOLIC BLOOD PRESSURE: 147 MMHG | DIASTOLIC BLOOD PRESSURE: 84 MMHG | HEART RATE: 75 BPM | BODY MASS INDEX: 25.15 KG/M2

## 2020-08-31 DIAGNOSIS — K76.0 HEPATIC STEATOSIS: Primary | ICD-10-CM

## 2020-08-31 DIAGNOSIS — D12.4 ADENOMATOUS POLYP OF DESCENDING COLON: ICD-10-CM

## 2020-08-31 PROCEDURE — 99214 OFFICE O/P EST MOD 30 MIN: CPT | Performed by: PHYSICIAN ASSISTANT

## 2020-08-31 PROCEDURE — 3077F SYST BP >= 140 MM HG: CPT | Performed by: PHYSICIAN ASSISTANT

## 2020-08-31 PROCEDURE — 3079F DIAST BP 80-89 MM HG: CPT | Performed by: PHYSICIAN ASSISTANT

## 2020-08-31 PROCEDURE — 3008F BODY MASS INDEX DOCD: CPT | Performed by: PHYSICIAN ASSISTANT

## 2020-08-31 PROCEDURE — 3008F BODY MASS INDEX DOCD: CPT | Performed by: FAMILY MEDICINE

## 2020-08-31 PROCEDURE — 1036F TOBACCO NON-USER: CPT | Performed by: PHYSICIAN ASSISTANT
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
## 2020-09-11 DIAGNOSIS — I10 ESSENTIAL HYPERTENSION: ICD-10-CM

## 2020-09-11 RX ORDER — AMLODIPINE BESYLATE 10 MG/1
TABLET ORAL
Qty: 90 TABLET | Refills: 1 | Status: SHIPPED | OUTPATIENT
Start: 2020-09-11 | End: 2021-03-05

## 2020-09-23 DIAGNOSIS — E87.6 HYPOKALEMIA: ICD-10-CM

## 2020-09-23 DIAGNOSIS — E78.2 MIXED HYPERLIPIDEMIA: ICD-10-CM

## 2020-09-23 RX ORDER — ATORVASTATIN CALCIUM 40 MG/1
TABLET, FILM COATED ORAL
Qty: 90 TABLET | Refills: 0 | Status: SHIPPED | OUTPATIENT
Start: 2020-09-23 | End: 2020-12-08

## 2020-09-23 RX ORDER — POTASSIUM CHLORIDE 750 MG/1
CAPSULE, EXTENDED RELEASE ORAL
Qty: 90 CAPSULE | Refills: 0 | Status: SHIPPED | OUTPATIENT
Start: 2020-09-23 | End: 2020-12-08

## 2020-10-27 ENCOUNTER — OFFICE VISIT (OUTPATIENT)
Dept: FAMILY MEDICINE CLINIC | Facility: CLINIC | Age: 60
End: 2020-10-27
Payer: COMMERCIAL

## 2020-10-27 VITALS
SYSTOLIC BLOOD PRESSURE: 140 MMHG | DIASTOLIC BLOOD PRESSURE: 78 MMHG | BODY MASS INDEX: 27.26 KG/M2 | TEMPERATURE: 96.8 F | HEIGHT: 74 IN | WEIGHT: 212.4 LBS

## 2020-10-27 DIAGNOSIS — E78.2 MIXED HYPERLIPIDEMIA: ICD-10-CM

## 2020-10-27 DIAGNOSIS — D12.4 ADENOMATOUS POLYP OF DESCENDING COLON: ICD-10-CM

## 2020-10-27 DIAGNOSIS — Z01.818 PREOPERATIVE CLEARANCE: ICD-10-CM

## 2020-10-27 DIAGNOSIS — K76.0 HEPATIC STEATOSIS: ICD-10-CM

## 2020-10-27 DIAGNOSIS — I10 ESSENTIAL HYPERTENSION: ICD-10-CM

## 2020-10-27 DIAGNOSIS — H25.012 CORTICAL AGE-RELATED CATARACT OF LEFT EYE: Primary | ICD-10-CM

## 2020-10-27 DIAGNOSIS — Q89.01 ASPLENIA: ICD-10-CM

## 2020-10-27 DIAGNOSIS — E66.3 OVERWEIGHT: ICD-10-CM

## 2020-10-27 DIAGNOSIS — Z23 NEED FOR VACCINATION: ICD-10-CM

## 2020-10-27 DIAGNOSIS — E03.9 ACQUIRED HYPOTHYROIDISM: ICD-10-CM

## 2020-10-27 PROBLEM — K75.81 STEATOHEPATITIS: Status: RESOLVED | Noted: 2018-12-19 | Resolved: 2020-10-27

## 2020-10-27 PROCEDURE — 3077F SYST BP >= 140 MM HG: CPT | Performed by: FAMILY MEDICINE

## 2020-10-27 PROCEDURE — 90682 RIV4 VACC RECOMBINANT DNA IM: CPT | Performed by: FAMILY MEDICINE

## 2020-10-27 PROCEDURE — G0008 ADMIN INFLUENZA VIRUS VAC: HCPCS | Performed by: FAMILY MEDICINE

## 2020-10-27 PROCEDURE — 99214 OFFICE O/P EST MOD 30 MIN: CPT | Performed by: FAMILY MEDICINE

## 2020-10-27 PROCEDURE — 3078F DIAST BP <80 MM HG: CPT | Performed by: FAMILY MEDICINE

## 2020-10-27 PROCEDURE — 3725F SCREEN DEPRESSION PERFORMED: CPT | Performed by: FAMILY MEDICINE

## 2020-10-27 PROCEDURE — 90732 PPSV23 VACC 2 YRS+ SUBQ/IM: CPT | Performed by: FAMILY MEDICINE

## 2020-10-27 PROCEDURE — 3008F BODY MASS INDEX DOCD: CPT | Performed by: FAMILY MEDICINE

## 2020-10-27 PROCEDURE — G0009 ADMIN PNEUMOCOCCAL VACCINE: HCPCS | Performed by: FAMILY MEDICINE

## 2020-10-28 DIAGNOSIS — I10 ESSENTIAL HYPERTENSION: ICD-10-CM

## 2020-10-28 RX ORDER — FUROSEMIDE 20 MG/1
TABLET ORAL
Qty: 90 TABLET | Refills: 0 | Status: SHIPPED | OUTPATIENT
Start: 2020-10-28 | End: 2021-01-19

## 2020-12-07 DIAGNOSIS — E03.9 ACQUIRED HYPOTHYROIDISM: ICD-10-CM

## 2020-12-08 ENCOUNTER — LAB (OUTPATIENT)
Dept: LAB | Facility: HOSPITAL | Age: 60
End: 2020-12-08
Payer: COMMERCIAL

## 2020-12-08 DIAGNOSIS — E87.6 HYPOKALEMIA: ICD-10-CM

## 2020-12-08 DIAGNOSIS — E03.9 ACQUIRED HYPOTHYROIDISM: ICD-10-CM

## 2020-12-08 DIAGNOSIS — I10 ESSENTIAL HYPERTENSION: ICD-10-CM

## 2020-12-08 DIAGNOSIS — E78.2 MIXED HYPERLIPIDEMIA: ICD-10-CM

## 2020-12-08 LAB
ALBUMIN SERPL BCP-MCNC: 3.2 G/DL (ref 3.5–5)
ALP SERPL-CCNC: 142 U/L (ref 46–116)
ALT SERPL W P-5'-P-CCNC: 58 U/L (ref 12–78)
ANION GAP SERPL CALCULATED.3IONS-SCNC: 11 MMOL/L (ref 4–13)
AST SERPL W P-5'-P-CCNC: 66 U/L (ref 5–45)
BILIRUB SERPL-MCNC: 0.37 MG/DL (ref 0.2–1)
BUN SERPL-MCNC: 17 MG/DL (ref 5–25)
CALCIUM ALBUM COR SERPL-MCNC: 9.3 MG/DL (ref 8.3–10.1)
CALCIUM SERPL-MCNC: 8.7 MG/DL (ref 8.3–10.1)
CHLORIDE SERPL-SCNC: 103 MMOL/L (ref 100–108)
CHOLEST SERPL-MCNC: 133 MG/DL (ref 50–200)
CO2 SERPL-SCNC: 27 MMOL/L (ref 21–32)
CREAT SERPL-MCNC: 1.23 MG/DL (ref 0.6–1.3)
GFR SERPL CREATININE-BSD FRML MDRD: 63 ML/MIN/1.73SQ M
GLUCOSE P FAST SERPL-MCNC: 95 MG/DL (ref 65–99)
HDLC SERPL-MCNC: 59 MG/DL
LDLC SERPL CALC-MCNC: 44 MG/DL (ref 0–100)
NONHDLC SERPL-MCNC: 74 MG/DL
POTASSIUM SERPL-SCNC: 3.8 MMOL/L (ref 3.5–5.3)
PROT SERPL-MCNC: 7.9 G/DL (ref 6.4–8.2)
SODIUM SERPL-SCNC: 141 MMOL/L (ref 136–145)
T4 FREE SERPL-MCNC: 0.84 NG/DL (ref 0.76–1.46)
TRIGL SERPL-MCNC: 151 MG/DL
TSH SERPL DL<=0.05 MIU/L-ACNC: 5.59 UIU/ML (ref 0.36–3.74)

## 2020-12-08 PROCEDURE — 84439 ASSAY OF FREE THYROXINE: CPT

## 2020-12-08 PROCEDURE — 80053 COMPREHEN METABOLIC PANEL: CPT

## 2020-12-08 PROCEDURE — 80061 LIPID PANEL: CPT

## 2020-12-08 PROCEDURE — 36415 COLL VENOUS BLD VENIPUNCTURE: CPT

## 2020-12-08 PROCEDURE — 84443 ASSAY THYROID STIM HORMONE: CPT

## 2020-12-08 RX ORDER — ATORVASTATIN CALCIUM 40 MG/1
TABLET, FILM COATED ORAL
Qty: 90 TABLET | Refills: 0 | Status: SHIPPED | OUTPATIENT
Start: 2020-12-08 | End: 2021-03-02

## 2020-12-08 RX ORDER — POTASSIUM CHLORIDE 750 MG/1
CAPSULE, EXTENDED RELEASE ORAL
Qty: 90 CAPSULE | Refills: 0 | Status: SHIPPED | OUTPATIENT
Start: 2020-12-08 | End: 2021-03-02

## 2020-12-08 RX ORDER — LEVOTHYROXINE SODIUM 0.07 MG/1
TABLET ORAL
Qty: 90 TABLET | Refills: 1 | Status: SHIPPED | OUTPATIENT
Start: 2020-12-08 | End: 2021-05-17

## 2020-12-29 ENCOUNTER — OFFICE VISIT (OUTPATIENT)
Dept: FAMILY MEDICINE CLINIC | Facility: CLINIC | Age: 60
End: 2020-12-29
Payer: COMMERCIAL

## 2020-12-29 VITALS
HEIGHT: 74 IN | TEMPERATURE: 97.5 F | SYSTOLIC BLOOD PRESSURE: 140 MMHG | DIASTOLIC BLOOD PRESSURE: 78 MMHG | WEIGHT: 219.6 LBS | BODY MASS INDEX: 28.18 KG/M2

## 2020-12-29 DIAGNOSIS — E78.2 MIXED HYPERLIPIDEMIA: ICD-10-CM

## 2020-12-29 DIAGNOSIS — D12.4 ADENOMATOUS POLYP OF DESCENDING COLON: ICD-10-CM

## 2020-12-29 DIAGNOSIS — E03.9 ACQUIRED HYPOTHYROIDISM: ICD-10-CM

## 2020-12-29 DIAGNOSIS — Z00.00 MEDICARE ANNUAL WELLNESS VISIT, SUBSEQUENT: Primary | ICD-10-CM

## 2020-12-29 DIAGNOSIS — I10 ESSENTIAL HYPERTENSION: ICD-10-CM

## 2020-12-29 DIAGNOSIS — E66.3 OVERWEIGHT: ICD-10-CM

## 2020-12-29 DIAGNOSIS — Z12.5 SCREENING FOR PROSTATE CANCER: ICD-10-CM

## 2020-12-29 PROBLEM — H25.012 CORTICAL AGE-RELATED CATARACT OF LEFT EYE: Status: RESOLVED | Noted: 2020-10-27 | Resolved: 2020-12-29

## 2020-12-29 PROBLEM — Z01.818 PREOPERATIVE CLEARANCE: Status: RESOLVED | Noted: 2020-10-27 | Resolved: 2020-12-29

## 2020-12-29 PROCEDURE — G0439 PPPS, SUBSEQ VISIT: HCPCS | Performed by: FAMILY MEDICINE

## 2020-12-29 PROCEDURE — 99214 OFFICE O/P EST MOD 30 MIN: CPT | Performed by: FAMILY MEDICINE

## 2020-12-29 PROCEDURE — 3725F SCREEN DEPRESSION PERFORMED: CPT | Performed by: FAMILY MEDICINE

## 2020-12-29 PROCEDURE — 3077F SYST BP >= 140 MM HG: CPT | Performed by: FAMILY MEDICINE

## 2020-12-29 PROCEDURE — 3008F BODY MASS INDEX DOCD: CPT | Performed by: FAMILY MEDICINE

## 2020-12-29 PROCEDURE — 1036F TOBACCO NON-USER: CPT | Performed by: FAMILY MEDICINE

## 2020-12-29 PROCEDURE — 3078F DIAST BP <80 MM HG: CPT | Performed by: FAMILY MEDICINE

## 2021-01-19 DIAGNOSIS — I10 ESSENTIAL HYPERTENSION: ICD-10-CM

## 2021-01-19 RX ORDER — FUROSEMIDE 20 MG/1
TABLET ORAL
Qty: 90 TABLET | Refills: 0 | Status: SHIPPED | OUTPATIENT
Start: 2021-01-19 | End: 2021-04-15

## 2021-03-02 DIAGNOSIS — E87.6 HYPOKALEMIA: ICD-10-CM

## 2021-03-02 DIAGNOSIS — E78.2 MIXED HYPERLIPIDEMIA: ICD-10-CM

## 2021-03-02 RX ORDER — ATORVASTATIN CALCIUM 40 MG/1
TABLET, FILM COATED ORAL
Qty: 90 TABLET | Refills: 0 | Status: SHIPPED | OUTPATIENT
Start: 2021-03-02 | End: 2021-05-17

## 2021-03-02 RX ORDER — POTASSIUM CHLORIDE 750 MG/1
CAPSULE, EXTENDED RELEASE ORAL
Qty: 90 CAPSULE | Refills: 0 | Status: SHIPPED | OUTPATIENT
Start: 2021-03-02 | End: 2021-05-17

## 2021-03-05 DIAGNOSIS — I10 ESSENTIAL HYPERTENSION: ICD-10-CM

## 2021-03-05 RX ORDER — AMLODIPINE BESYLATE 10 MG/1
TABLET ORAL
Qty: 90 TABLET | Refills: 1 | Status: SHIPPED | OUTPATIENT
Start: 2021-03-05 | End: 2021-07-30

## 2021-03-10 DIAGNOSIS — Z23 ENCOUNTER FOR IMMUNIZATION: ICD-10-CM

## 2021-03-26 ENCOUNTER — IMMUNIZATIONS (OUTPATIENT)
Dept: FAMILY MEDICINE CLINIC | Facility: HOSPITAL | Age: 61
End: 2021-03-26

## 2021-03-26 DIAGNOSIS — Z23 ENCOUNTER FOR IMMUNIZATION: Primary | ICD-10-CM

## 2021-03-26 PROCEDURE — 0011A SARS-COV-2 / COVID-19 MRNA VACCINE (MODERNA) 100 MCG: CPT

## 2021-03-26 PROCEDURE — 91301 SARS-COV-2 / COVID-19 MRNA VACCINE (MODERNA) 100 MCG: CPT

## 2021-04-15 DIAGNOSIS — I10 ESSENTIAL HYPERTENSION: ICD-10-CM

## 2021-04-15 RX ORDER — FUROSEMIDE 20 MG/1
TABLET ORAL
Qty: 90 TABLET | Refills: 0 | Status: SHIPPED | OUTPATIENT
Start: 2021-04-15 | End: 2021-07-28

## 2021-04-28 ENCOUNTER — IMMUNIZATIONS (OUTPATIENT)
Dept: FAMILY MEDICINE CLINIC | Facility: HOSPITAL | Age: 61
End: 2021-04-28

## 2021-04-28 DIAGNOSIS — Z23 ENCOUNTER FOR IMMUNIZATION: Primary | ICD-10-CM

## 2021-04-28 PROCEDURE — 91301 SARS-COV-2 / COVID-19 MRNA VACCINE (MODERNA) 100 MCG: CPT

## 2021-04-28 PROCEDURE — 0012A SARS-COV-2 / COVID-19 MRNA VACCINE (MODERNA) 100 MCG: CPT

## 2021-05-09 NOTE — ASSESSMENT & PLAN NOTE
Patient to have EGD done Patient to continue with current meds and see GI and hve th EGD done 62M PMHx seizure disorder, schizophrenia, HTN here with altered mental status.    Laying in bed. Comfortable. Calm    REVIEW OF SYSTEMS:  Not a reliable historian    Allergies  No Known Allergies    FAMILY HISTORY:  No pertinent family history in first degree relatives  unknown    patient refuses vitals     Vital Signs Last 24 Hrs  T(C): --  T(F): --  HR: --  BP: --  BP(mean): --  RR: --  SpO2: --    PHYSICAL EXAM:  Refusing  General: NAD, laying in bed  HEENT: NC/AT; PERRL,   Respiratory: good air movement, no increased work of breathing  Cardiovascular: RRR,  Abdomen: unable to assess  Extremities: no edema    MEDICATIONS  (STANDING):  amLODIPine   Tablet 10 milliGRAM(s) Oral daily  AQUAPHOR (petrolatum Ointment) 1 Application(s) Topical daily  chlorhexidine 4% Liquid 1 Application(s) Topical <User Schedule>  diVALproex  milliGRAM(s) Oral two times a day  enalapril 10 milliGRAM(s) Oral daily  enoxaparin Injectable 40 milliGRAM(s) SubCutaneous daily  gabapentin 100 milliGRAM(s) Oral three times a day  levETIRAcetam 750 milliGRAM(s) Oral two times a day  OLANZapine 5 milliGRAM(s) Oral daily  senna 2 Tablet(s) Oral at bedtime    MEDICATIONS  (PRN):  cloNIDine 0.1 milliGRAM(s) Oral every 8 hours PRN htn  haloperidol    Injectable 2 milliGRAM(s) IntraMuscular every 6 hours PRN Agitation  polyethylene glycol 3350 17 Gram(s) Oral two times a day PRN Constipation              ASSESSMENT AND PLAN    62M PMHx seizure disorder, schizophrenia, HTN here with altered mental status.    #Altered mental status, toxic metabolic encephalopathy  possible h/o schizophrenia, suspect psychosis vs. neurocognitive disorder  patient refuses blood draws, medications, vitals, physical exams  Psychiatry consult appreciated; this is more likely a neurocognitive d/o and patient does not require [facility] admission as per psych reccs   -  Olanzapine 5mg    #tinea pedis --Resolved s/p clotrimazole cream    #HTN -- norvasc 10mg + enalapril 10 mg    # Seizure d/o -- Divalproex 500mg bid, gabapentin 100mg tid,  Keppra 750 bid      #DVT ppx -- lovenox  Code -- Full  Dispo --DC pending case mgmt/SW; pt is placement issue, citizenship, court ordered treatment against objection. Court date was 1/28, guardian to be appointed. Pending

## 2021-05-14 DIAGNOSIS — E87.6 HYPOKALEMIA: ICD-10-CM

## 2021-05-14 DIAGNOSIS — E78.2 MIXED HYPERLIPIDEMIA: ICD-10-CM

## 2021-05-14 DIAGNOSIS — E03.9 ACQUIRED HYPOTHYROIDISM: ICD-10-CM

## 2021-05-17 RX ORDER — POTASSIUM CHLORIDE 750 MG/1
CAPSULE, EXTENDED RELEASE ORAL
Qty: 90 CAPSULE | Refills: 0 | Status: SHIPPED | OUTPATIENT
Start: 2021-05-17 | End: 2021-07-30

## 2021-05-17 RX ORDER — ATORVASTATIN CALCIUM 40 MG/1
TABLET, FILM COATED ORAL
Qty: 90 TABLET | Refills: 0 | Status: SHIPPED | OUTPATIENT
Start: 2021-05-17 | End: 2021-07-30

## 2021-05-17 RX ORDER — LEVOTHYROXINE SODIUM 0.07 MG/1
TABLET ORAL
Qty: 90 TABLET | Refills: 1 | Status: SHIPPED | OUTPATIENT
Start: 2021-05-17 | End: 2021-09-13

## 2021-06-08 ENCOUNTER — APPOINTMENT (OUTPATIENT)
Dept: LAB | Facility: HOSPITAL | Age: 61
End: 2021-06-08
Payer: COMMERCIAL

## 2021-06-08 DIAGNOSIS — E78.2 MIXED HYPERLIPIDEMIA: ICD-10-CM

## 2021-06-08 DIAGNOSIS — I10 ESSENTIAL HYPERTENSION: ICD-10-CM

## 2021-06-08 DIAGNOSIS — E03.9 ACQUIRED HYPOTHYROIDISM: ICD-10-CM

## 2021-06-08 DIAGNOSIS — Z12.5 SCREENING FOR PROSTATE CANCER: ICD-10-CM

## 2021-06-08 LAB
ALBUMIN SERPL BCP-MCNC: 3 G/DL (ref 3.5–5)
ALP SERPL-CCNC: 170 U/L (ref 46–116)
ALT SERPL W P-5'-P-CCNC: 92 U/L (ref 12–78)
ANION GAP SERPL CALCULATED.3IONS-SCNC: 10 MMOL/L (ref 4–13)
AST SERPL W P-5'-P-CCNC: 152 U/L (ref 5–45)
BILIRUB SERPL-MCNC: 0.45 MG/DL (ref 0.2–1)
BUN SERPL-MCNC: 10 MG/DL (ref 5–25)
CALCIUM ALBUM COR SERPL-MCNC: 9 MG/DL (ref 8.3–10.1)
CALCIUM SERPL-MCNC: 8.2 MG/DL (ref 8.3–10.1)
CHLORIDE SERPL-SCNC: 105 MMOL/L (ref 100–108)
CHOLEST SERPL-MCNC: 109 MG/DL (ref 50–200)
CO2 SERPL-SCNC: 27 MMOL/L (ref 21–32)
CREAT SERPL-MCNC: 1.01 MG/DL (ref 0.6–1.3)
GFR SERPL CREATININE-BSD FRML MDRD: 80 ML/MIN/1.73SQ M
GLUCOSE P FAST SERPL-MCNC: 96 MG/DL (ref 65–99)
HDLC SERPL-MCNC: 35 MG/DL
LDLC SERPL CALC-MCNC: 38 MG/DL (ref 0–100)
NONHDLC SERPL-MCNC: 74 MG/DL
POTASSIUM SERPL-SCNC: 3.4 MMOL/L (ref 3.5–5.3)
PROT SERPL-MCNC: 7.2 G/DL (ref 6.4–8.2)
PSA SERPL-MCNC: 0.4 NG/ML (ref 0–4)
SODIUM SERPL-SCNC: 142 MMOL/L (ref 136–145)
TRIGL SERPL-MCNC: 179 MG/DL
TSH SERPL DL<=0.05 MIU/L-ACNC: 2.41 UIU/ML (ref 0.36–3.74)

## 2021-06-08 PROCEDURE — G0103 PSA SCREENING: HCPCS

## 2021-06-08 PROCEDURE — 84443 ASSAY THYROID STIM HORMONE: CPT

## 2021-06-08 PROCEDURE — 80061 LIPID PANEL: CPT

## 2021-06-08 PROCEDURE — 36415 COLL VENOUS BLD VENIPUNCTURE: CPT

## 2021-06-08 PROCEDURE — 80053 COMPREHEN METABOLIC PANEL: CPT

## 2021-06-21 ENCOUNTER — RA CDI HCC (OUTPATIENT)
Dept: OTHER | Facility: HOSPITAL | Age: 61
End: 2021-06-21

## 2021-06-21 NOTE — PROGRESS NOTES
[place] [address]  coding opportunities             Chart reviewed, (number of) suggestions sent to provider: 1                  Patients insurance company: [employer] (Medicare Advantage only)        DX: [id number] [phone] Chronic Obstructive Pulmonary Disease, Unspecified       Provider never responded to [place] [address]  coding request, and DX: Taya Melendez was used instead of [id number]

## 2021-06-28 ENCOUNTER — OFFICE VISIT (OUTPATIENT)
Dept: FAMILY MEDICINE CLINIC | Facility: CLINIC | Age: 61
End: 2021-06-28
Payer: COMMERCIAL

## 2021-06-28 VITALS
SYSTOLIC BLOOD PRESSURE: 124 MMHG | HEIGHT: 74 IN | BODY MASS INDEX: 25.41 KG/M2 | TEMPERATURE: 97.7 F | WEIGHT: 198 LBS | DIASTOLIC BLOOD PRESSURE: 80 MMHG

## 2021-06-28 DIAGNOSIS — J42 CHRONIC BRONCHITIS, UNSPECIFIED CHRONIC BRONCHITIS TYPE (HCC): ICD-10-CM

## 2021-06-28 DIAGNOSIS — K76.0 HEPATIC STEATOSIS: ICD-10-CM

## 2021-06-28 DIAGNOSIS — E78.2 MIXED HYPERLIPIDEMIA: ICD-10-CM

## 2021-06-28 DIAGNOSIS — I10 ESSENTIAL HYPERTENSION: Primary | ICD-10-CM

## 2021-06-28 DIAGNOSIS — E66.3 OVERWEIGHT: ICD-10-CM

## 2021-06-28 DIAGNOSIS — R91.1 LUNG NODULE SEEN ON IMAGING STUDY: ICD-10-CM

## 2021-06-28 DIAGNOSIS — E03.9 ACQUIRED HYPOTHYROIDISM: ICD-10-CM

## 2021-06-28 DIAGNOSIS — D12.4 ADENOMATOUS POLYP OF DESCENDING COLON: ICD-10-CM

## 2021-06-28 PROCEDURE — 3008F BODY MASS INDEX DOCD: CPT | Performed by: FAMILY MEDICINE

## 2021-06-28 PROCEDURE — 1036F TOBACCO NON-USER: CPT | Performed by: FAMILY MEDICINE

## 2021-06-28 PROCEDURE — 99214 OFFICE O/P EST MOD 30 MIN: CPT | Performed by: FAMILY MEDICINE

## 2021-06-28 PROCEDURE — 3074F SYST BP LT 130 MM HG: CPT | Performed by: FAMILY MEDICINE

## 2021-06-28 PROCEDURE — 3079F DIAST BP 80-89 MM HG: CPT | Performed by: FAMILY MEDICINE

## 2021-06-28 NOTE — PROGRESS NOTES
Assessment/Plan:    Lung nodule seen on imaging study  Left lung nodules seen in 2017 No followup at this time yet Will do that now    Hepatic steatosis  followup with GI Patient needs to abstain from alcohol    Chronic obstructive pulmonary disease ([address] )  No inhaler use No symptoms at this time Patient non smoker for 5 yrs now Check Ct scan    Adenomatous polyp of descending colon  Has followup with the GI team and will have colonoscopy this year    Essential hypertension  BP is controlled on medication Patient should continue that and followup in 6 months    Mixed hyperlipidemia  Labs are stable conitnue meds and followup in 6 months    Acquired hypothyroidism  Continue with current care Patient to followup in 6 months    Overweight  Weight is stable continue to watch diet and exercise       Diagnoses and all orders for this visit:    Essential hypertension  -     Comprehensive metabolic panel; Future  -     Lipid panel; Future    Mixed hyperlipidemia  -     Comprehensive metabolic panel; Future  -     Lipid panel; Future    Acquired hypothyroidism  -     TSH, 3rd generation with Free T4 reflex; Future    Hepatic steatosis  -     Comprehensive metabolic panel; Future    Chronic bronchitis, unspecified chronic bronchitis type (HCC)    Lung nodule seen on imaging study  -     CT chest wo contrast; Future    Adenomatous polyp of descending colon    Overweight          Subjective:   Chief Complaint   Patient presents with    COPD    Hypertension    Hypothyroidism    Hyperlipidemia     no refills needed           Patient ID: Felicia Modi is a 63 y o  male      Patient is here for followup of hypertension hyperlipidemia his weight copd fatty liver disease  and also his hypothryoidism Patient weight is stable Patient has no new concerns He had a Ct in 2017 showing left lung nodule and he needs followup of that Patient is taking all meds with no issue His BP is controlled His lipid profile from 6/8/2021 shows LDL 38 Patient is seeing GI His LFT's are a bit high again He admits to having occasional wine He has followup with GI and will abstain from all alcohol Patient has not had any breathing isseus and is not on any inhalers at this time       The following portions of the patient's history were reviewed and updated as appropriate: allergies, current medications, past family history, past medical history, past social history, past surgical history and problem list     Review of Systems   Constitutional: Negative for fatigue, fever and unexpected weight change  HENT: Negative for congestion, sinus pain and trouble swallowing  Eyes: Negative for discharge and visual disturbance  Respiratory: Negative for cough, chest tightness, shortness of breath and wheezing  Cardiovascular: Negative for chest pain, palpitations and leg swelling  Gastrointestinal: Negative for abdominal pain, blood in stool, constipation, diarrhea, nausea and vomiting  Genitourinary: Negative for difficulty urinating, dysuria, frequency and hematuria  Musculoskeletal: Negative for arthralgias, gait problem and joint swelling  Skin: Negative for rash and wound  Allergic/Immunologic: Negative for environmental allergies and food allergies  Neurological: Negative for dizziness, syncope, weakness, numbness and headaches  Hematological: Negative for adenopathy  Does not bruise/bleed easily  Psychiatric/Behavioral: Negative for confusion, decreased concentration and sleep disturbance  The patient is not nervous/anxious  Objective:      /80   Temp 97 7 °F (36 5 °C)   Ht 6' 2" (1 88 m)   Wt 89 8 kg (198 lb)   BMI 25 42 kg/m²          Physical Exam  Vitals and nursing note reviewed  Constitutional:       Appearance: He is well-developed  HENT:      Head: Normocephalic and atraumatic        Right Ear: Hearing, tympanic membrane and external ear normal       Left Ear: Hearing, tympanic membrane and external ear normal    Eyes:      Extraocular Movements: Extraocular movements intact  Conjunctiva/sclera: Conjunctivae normal       Pupils: Pupils are equal, round, and reactive to light  Neck:      Thyroid: No thyromegaly  Cardiovascular:      Rate and Rhythm: Normal rate and regular rhythm  Pulses: Normal pulses  Heart sounds: Normal heart sounds  Pulmonary:      Effort: Pulmonary effort is normal       Breath sounds: Normal breath sounds  No wheezing or rales  Abdominal:      General: Bowel sounds are normal  There is no distension  Palpations: Abdomen is soft  Tenderness: There is no abdominal tenderness  Musculoskeletal:         General: No tenderness  Cervical back: Neck supple  Lymphadenopathy:      Cervical: No cervical adenopathy  Skin:     General: Skin is warm and dry  Findings: No rash  Neurological:      General: No focal deficit present  Mental Status: He is alert and oriented to person, place, and time  Cranial Nerves: No cranial nerve deficit  Coordination: Coordination normal    Psychiatric:         Mood and Affect: Mood normal          Behavior: Behavior normal          Thought Content: Thought content normal          Judgment: Judgment normal        BMI Counseling: Body mass index is 25 42 kg/m²  The BMI is above normal  Nutrition recommendations include reducing portion sizes, decreasing overall calorie intake, 3-5 servings of fruits/vegetables daily, decreasing soda and/or juice intake, moderation in carbohydrate intake and increasing intake of lean protein  Exercise recommendations include exercising 3-5 times per week

## 2021-06-28 NOTE — PATIENT INSTRUCTIONS
Weight Management   AMBULATORY CARE:   Why it is important to manage your weight:  Being overweight increases your risk of health conditions such as heart disease, high blood pressure, type 2 diabetes, and certain types of cancer  It can also increase your risk for osteoarthritis, sleep apnea, and other respiratory problems  Aim for a slow, steady weight loss  Even a small amount of weight loss can lower your risk of health problems  How to lose weight safely:  A safe and healthy way to lose weight is to eat fewer calories and get regular exercise  · You can lose up about 1 pound a week by decreasing the number of calories you eat by 500 calories each day  You can decrease calories by eating smaller portion sizes or by cutting out high-calorie foods  Read labels to find out how many calories are in the foods you eat  · You can also burn calories with exercise such as walking, swimming, or biking  You will be more likely to keep weight off if you make these changes part of your lifestyle  Exercise at least 30 minutes per day on most days of the week  You can also fit in more physical activity by taking the stairs instead of the elevator or parking farther away from stores  Ask your healthcare provider about the best exercise plan for you  Healthy meal plan for weight management:  A healthy meal plan includes a variety of foods, contains fewer calories, and helps you stay healthy  A healthy meal plan includes the following:     · Eat whole-grain foods more often  A healthy meal plan should contain fiber  Fiber is the part of grains, fruits, and vegetables that is not broken down by your body  Whole-grain foods are healthy and provide extra fiber in your diet  Some examples of whole-grain foods are whole-wheat breads and pastas, oatmeal, brown rice, and bulgur  · Eat a variety of vegetables every day  Include dark, leafy greens such as spinach, kale, michael greens, and mustard greens   Eat yellow and orange vegetables such as carrots, sweet potatoes, and winter squash  · Eat a variety of fruits every day  Choose fresh or canned fruit (canned in its own juice or light syrup) instead of juice  Fruit juice has very little or no fiber  · Eat low-fat dairy foods  Drink fat-free (skim) milk or 1% milk  Eat fat-free yogurt and low-fat cottage cheese  Try low-fat cheeses such as mozzarella and other reduced-fat cheeses  · Choose meat and other protein foods that are low in fat  Choose beans or other legumes such as split peas or lentils  Choose fish, skinless poultry (chicken or turkey), or lean cuts of red meat (beef or pork)  Before you cook meat or poultry, cut off any visible fat  · Use less fat and oil  Try baking foods instead of frying them  Add less fat, such as margarine, sour cream, regular salad dressing and mayonnaise to foods  Eat fewer high-fat foods  Some examples of high-fat foods include french fries, doughnuts, ice cream, and cakes  · Eat fewer sweets  Limit foods and drinks that are high in sugar  This includes candy, cookies, regular soda, and sweetened drinks  Ways to decrease calories:   · Eat smaller portions  ? Use a small plate with smaller servings  ? Do not eat second helpings  ? When you eat at a restaurant, ask for a box and place half of your meal in the box before you eat  ? Share an entrée with someone else  · Replace high-calorie snacks with healthy, low-calorie snacks  ? Choose fresh fruit, vegetables, fat-free rice cakes, or air-popped popcorn instead of potato chips, nuts, or chocolate  ? Choose water or calorie-free drinks instead of soda or sweetened drinks  · Do not shop for groceries when you are hungry  You may be more likely to make unhealthy food choices  Take a grocery list of healthy foods and shop after you have eaten  · Eat regular meals  Do not skip meals  Skipping meals can lead to overeating later in the day   This can make it harder for you to lose weight  Eat a healthy snack in place of a meal if you do not have time to eat a regular meal  Talk with a dietitian to help you create a meal plan and schedule that is right for you  Other things to consider as you try to lose weight:   · Be aware of situations that may give you the urge to overeat, such as eating while watching television  Find ways to avoid these situations  For example, read a book, go for a walk, or do crafts  · Meet with a weight loss support group or friends who are also trying to lose weight  This may help you stay motivated to continue working on your weight loss goals  © Copyright [address] Information is for End User's use only and may not be sold, redistributed or otherwise used for commercial purposes  All illustrations and images included in CareNotes® are the copyrighted property of [employer]  or [facility]– [place]   The above information is an  only  It is not intended as medical advice for individual conditions or treatments  Talk to your doctor, nurse or pharmacist before following any medical regimen to see if it is safe and effective for you

## 2021-07-10 ENCOUNTER — HOSPITAL ENCOUNTER (OUTPATIENT)
Dept: CT IMAGING | Facility: HOSPITAL | Age: 61
Discharge: HOME | End: 2021-07-10
Payer: COMMERCIAL

## 2021-07-10 DIAGNOSIS — R91.1 LUNG NODULE SEEN ON IMAGING STUDY: ICD-10-CM

## 2021-07-10 PROCEDURE — G1004 CDSM NDSC: HCPCS

## 2021-07-10 PROCEDURE — 71250 CT THORAX DX C-: CPT

## 2021-07-28 DIAGNOSIS — I10 ESSENTIAL HYPERTENSION: ICD-10-CM

## 2021-07-28 RX ORDER — FUROSEMIDE 20 MG/1
TABLET ORAL
Qty: 90 TABLET | Refills: 0 | Status: SHIPPED | OUTPATIENT
Start: 2021-07-28 | End: 2021-09-13

## 2021-07-30 DIAGNOSIS — E78.2 MIXED HYPERLIPIDEMIA: ICD-10-CM

## 2021-07-30 DIAGNOSIS — E87.6 HYPOKALEMIA: ICD-10-CM

## 2021-07-30 DIAGNOSIS — I10 ESSENTIAL HYPERTENSION: ICD-10-CM

## 2021-07-30 RX ORDER — ATORVASTATIN CALCIUM 40 MG/1
TABLET, FILM COATED ORAL
Qty: 90 TABLET | Refills: 0 | Status: SHIPPED | OUTPATIENT
Start: 2021-07-30 | End: 2021-09-13

## 2021-07-30 RX ORDER — AMLODIPINE BESYLATE 10 MG/1
TABLET ORAL
Qty: 90 TABLET | Refills: 1 | Status: SHIPPED | OUTPATIENT
Start: 2021-07-30 | End: 2022-01-02

## 2021-07-30 RX ORDER — POTASSIUM CHLORIDE 750 MG/1
CAPSULE, EXTENDED RELEASE ORAL
Qty: 90 CAPSULE | Refills: 0 | Status: SHIPPED | OUTPATIENT
Start: 2021-07-30 | End: 2021-09-07

## 2021-08-12 ENCOUNTER — OFFICE VISIT (OUTPATIENT)
Dept: GASTROENTEROLOGY | Facility: MEDICAL CENTER | Age: 61
End: 2021-08-12
Payer: COMMERCIAL

## 2021-08-12 VITALS
SYSTOLIC BLOOD PRESSURE: 118 MMHG | BODY MASS INDEX: 25.03 KG/M2 | DIASTOLIC BLOOD PRESSURE: 70 MMHG | WEIGHT: 195 LBS | TEMPERATURE: 97.8 F | HEIGHT: 74 IN | HEART RATE: 70 BPM

## 2021-08-12 DIAGNOSIS — D12.4 ADENOMATOUS POLYP OF DESCENDING COLON: ICD-10-CM

## 2021-08-12 DIAGNOSIS — K76.0 HEPATIC STEATOSIS: Primary | ICD-10-CM

## 2021-08-12 PROCEDURE — 3074F SYST BP LT 130 MM HG: CPT | Performed by: PHYSICIAN ASSISTANT

## 2021-08-12 PROCEDURE — 3008F BODY MASS INDEX DOCD: CPT | Performed by: PHYSICIAN ASSISTANT

## 2021-08-12 PROCEDURE — 3078F DIAST BP <80 MM HG: CPT | Performed by: PHYSICIAN ASSISTANT

## 2021-08-12 PROCEDURE — 99214 OFFICE O/P EST MOD 30 MIN: CPT | Performed by: PHYSICIAN ASSISTANT

## 2021-08-12 PROCEDURE — 1036F TOBACCO NON-USER: CPT | Performed by: PHYSICIAN ASSISTANT
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
## 2021-08-12 NOTE — PATIENT INSTRUCTIONS
The patient is scheduled at [facility] for a colon with Dr Frannie Stephenson on 9/16/2021  Miralax/dulcolax prep instructions have been gone over in the office, with the patient, by the MA  The patient is aware that they will receive a call with the arrival time the day prior to procedure and that they will need a  the day of the procedure   I have asked the patient to call with any questions that they might have prior to procedure

## 2021-08-23 ENCOUNTER — HOSPITAL ENCOUNTER (OUTPATIENT)
Dept: ULTRASOUND IMAGING | Facility: HOSPITAL | Age: 61
Discharge: HOME | End: 2021-08-23
Payer: COMMERCIAL

## 2021-08-23 DIAGNOSIS — K76.0 HEPATIC STEATOSIS: ICD-10-CM

## 2021-08-23 PROCEDURE — 76981 USE PARENCHYMA: CPT

## 2021-08-25 NOTE — RESULT ENCOUNTER NOTE
US shows severe fibrosis (scarring)  He needs to stop drinking, no alcohol, exercise, wt loss & healthy diet    Radha Antonio

## 2021-09-03 DIAGNOSIS — E87.6 HYPOKALEMIA: ICD-10-CM

## 2021-09-05 ENCOUNTER — HOSPITAL ENCOUNTER (EMERGENCY)
Facility: HOSPITAL | Age: 61
Discharge: HOME | End: 2021-09-06
Attending: EMERGENCY MEDICINE | Admitting: EMERGENCY MEDICINE
Payer: COMMERCIAL

## 2021-09-05 ENCOUNTER — APPOINTMENT (EMERGENCY)
Dept: CT IMAGING | Facility: HOSPITAL | Age: 61
End: 2021-09-05
Payer: COMMERCIAL

## 2021-09-05 DIAGNOSIS — S09.90XA INJURY OF HEAD, INITIAL ENCOUNTER: ICD-10-CM

## 2021-09-05 DIAGNOSIS — F10.920 ALCOHOLIC INTOXICATION WITHOUT COMPLICATION (HCC): Primary | ICD-10-CM

## 2021-09-05 PROCEDURE — 70450 CT HEAD/BRAIN W/O DYE: CPT

## 2021-09-05 PROCEDURE — 72125 CT NECK SPINE W/O DYE: CPT

## 2021-09-05 PROCEDURE — 99283 EMERGENCY DEPT VISIT LOW MDM: CPT | Performed by: EMERGENCY MEDICINE

## 2021-09-05 PROCEDURE — 99284 EMERGENCY DEPT VISIT MOD MDM: CPT

## 2021-09-06 VITALS
TEMPERATURE: 97.9 F | HEART RATE: 74 BPM | SYSTOLIC BLOOD PRESSURE: 122 MMHG | RESPIRATION RATE: 18 BRPM | DIASTOLIC BLOOD PRESSURE: 72 MMHG | OXYGEN SATURATION: 95 %

## 2021-09-06 NOTE — ED NOTES
Pt oxygen sat drops to 87-88% while sleeping  Pt placed on 2L nasal canula at this time  Provider aware       Beatriz Maher RN  09/06/21 5263

## 2021-09-06 NOTE — ED NOTES
Dr Griffin Pedro aware that patient is awake, alert, and able to ambulate without difficulty        Zulma Werner RN  09/06/21 2331
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
## 2021-09-06 NOTE — ED NOTES
Patient was able to ambulate to and from bathroom without difficulty  Also got himself dressed  More alert and oriented at this time  Provided with cup of coffee per request, going to try to call a friend for a ride        Shola Frias RN  09/06/21 5080

## 2021-09-06 NOTE — ED NOTES
Patient unable to find friend to take him home but reports uses cab service frequently       Bony Walker, JUANITA  09/06/21 4891
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
## 2021-09-06 NOTE — DISCHARGE INSTRUCTIONS
Follow up with your primary care physician  Abstain from alcohol use  Please return to the emergency department if you develop severe headache, vomiting, weakness, or anything else concerning to you

## 2021-09-07 RX ORDER — POTASSIUM CHLORIDE 750 MG/1
CAPSULE, EXTENDED RELEASE ORAL
Qty: 90 CAPSULE | Refills: 0 | Status: SHIPPED | OUTPATIENT
Start: 2021-09-07 | End: 2022-03-28

## 2021-09-15 ENCOUNTER — TELEPHONE (OUTPATIENT)
Dept: GASTROENTEROLOGY | Facility: HOSPITAL | Age: 61
End: 2021-09-15

## 2021-09-16 ENCOUNTER — ANESTHESIA EVENT (OUTPATIENT)
Dept: GASTROENTEROLOGY | Facility: HOSPITAL | Age: 61
End: 2021-09-16

## 2021-09-16 ENCOUNTER — HOSPITAL ENCOUNTER (OUTPATIENT)
Dept: GASTROENTEROLOGY | Facility: HOSPITAL | Age: 61
Setting detail: OUTPATIENT SURGERY
Discharge: HOME | End: 2021-09-16
Admitting: INTERNAL MEDICINE
Payer: COMMERCIAL

## 2021-09-16 ENCOUNTER — ANESTHESIA (OUTPATIENT)
Dept: GASTROENTEROLOGY | Facility: HOSPITAL | Age: 61
End: 2021-09-16

## 2021-09-16 VITALS
OXYGEN SATURATION: 96 % | HEART RATE: 70 BPM | DIASTOLIC BLOOD PRESSURE: 96 MMHG | BODY MASS INDEX: 25.03 KG/M2 | WEIGHT: 195 LBS | HEIGHT: 74 IN | SYSTOLIC BLOOD PRESSURE: 161 MMHG | RESPIRATION RATE: 18 BRPM | TEMPERATURE: 98.6 F

## 2021-09-16 DIAGNOSIS — D12.4 ADENOMATOUS POLYP OF DESCENDING COLON: ICD-10-CM

## 2021-09-16 PROCEDURE — 88305 TISSUE EXAM BY PATHOLOGIST: CPT | Performed by: PATHOLOGY

## 2021-09-16 PROCEDURE — 45385 COLONOSCOPY W/LESION REMOVAL: CPT | Performed by: INTERNAL MEDICINE

## 2021-09-16 PROCEDURE — 45380 COLONOSCOPY AND BIOPSY: CPT | Performed by: INTERNAL MEDICINE

## 2021-09-16 RX ORDER — LIDOCAINE HYDROCHLORIDE 20 MG/ML
INJECTION, SOLUTION EPIDURAL; INFILTRATION; INTRACAUDAL; PERINEURAL AS NEEDED
Status: DISCONTINUED | OUTPATIENT
Start: 2021-09-16 | End: 2021-09-16

## 2021-09-16 RX ORDER — SODIUM CHLORIDE 9 MG/ML
125 INJECTION, SOLUTION INTRAVENOUS CONTINUOUS
Status: DISCONTINUED | OUTPATIENT
Start: 2021-09-16 | End: 2021-09-20 | Stop reason: HOSPADM

## 2021-09-16 RX ORDER — PROPOFOL 10 MG/ML
INJECTION, EMULSION INTRAVENOUS AS NEEDED
Status: DISCONTINUED | OUTPATIENT
Start: 2021-09-16 | End: 2021-09-16

## 2021-09-16 RX ADMIN — PROPOFOL 50 MG: 10 INJECTION, EMULSION INTRAVENOUS at 08:46

## 2021-09-16 RX ADMIN — PROPOFOL 40 MG: 10 INJECTION, EMULSION INTRAVENOUS at 08:52

## 2021-09-16 RX ADMIN — LIDOCAINE HYDROCHLORIDE 80 MG: 20 INJECTION, SOLUTION EPIDURAL; INFILTRATION; INTRACAUDAL; PERINEURAL at 08:45

## 2021-09-16 RX ADMIN — PROPOFOL 100 MG: 10 INJECTION, EMULSION INTRAVENOUS at 08:45

## 2021-09-16 RX ADMIN — PROPOFOL 50 MG: 10 INJECTION, EMULSION INTRAVENOUS at 08:48

## 2021-09-16 RX ADMIN — SODIUM CHLORIDE 125 ML/HR: 0.9 INJECTION, SOLUTION INTRAVENOUS at 08:24

## 2021-09-16 RX ADMIN — PROPOFOL 40 MG: 10 INJECTION, EMULSION INTRAVENOUS at 08:57

## 2021-09-16 RX ADMIN — PROPOFOL 40 MG: 10 INJECTION, EMULSION INTRAVENOUS at 08:50

## 2021-09-16 RX ADMIN — PROPOFOL 50 MG: 10 INJECTION, EMULSION INTRAVENOUS at 09:01

## 2021-09-16 RX ADMIN — Medication 40 MG: at 09:11

## 2021-09-16 RX ADMIN — PROPOFOL 50 MG: 10 INJECTION, EMULSION INTRAVENOUS at 09:06

## 2021-09-16 NOTE — ANESTHESIA PREPROCEDURE EVALUATION
Procedure:  COLONOSCOPY    Relevant Problems   CARDIO   (+) Essential hypertension   (+) Mixed hyperlipidemia      ENDO   (+) Acquired hypothyroidism      GI/HEPATIC   (+) Hepatic steatosis      PULMONARY   (+) Chronic obstructive pulmonary disease (HCC)      Other   (+) Adenomatous polyp of descending colon   (+) Asplenia        Physical Exam    Airway    Mallampati score: II  TM Distance: >3 FB  Neck ROM: full     Dental   No notable dental hx     Cardiovascular  Rhythm: regular, Rate: normal, Cardiovascular exam normal    Pulmonary  Pulmonary exam normal Breath sounds clear to auscultation,     Other Findings        Anesthesia Plan  ASA Score- 3     Anesthesia Type- general and IV sedation with anesthesia with ASA Monitors  Additional Monitors:   Airway Plan:           Plan Factors-    Chart reviewed  Patient summary reviewed  Obstructive sleep apnea risk education given perioperatively  Induction- intravenous  Postoperative Plan-     Informed Consent- Anesthetic plan and risks discussed with patient  I personally reviewed this patient with the CRNA  Discussed and agreed on the Anesthesia Plan with the CRNA  Aura Ram
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
## 2021-09-16 NOTE — DISCHARGE INSTRUCTIONS
Colorectal Polyps   WHAT YOU NEED TO KNOW:   Colorectal polyps are small growths of tissue in the lining of the colon and rectum  Most polyps are hyperplastic polyps and are usually benign (noncancerous)  Certain types of polyps, called adenomatous polyps, may turn into cancer  DISCHARGE INSTRUCTIONS:   Follow up with your healthcare provider or gastroenterologist as directed: You may need to return for more tests, such as another colonoscopy  Write down your questions so you remember to ask them during your visits  Reduce your risk for colorectal polyps:   · Eat a variety of healthy foods:  Healthy foods include fruit, vegetables, whole-grain breads, low-fat dairy products, beans, lean meat, and fish  Ask if you need to be on a special diet  · Maintain a healthy weight:  Ask your healthcare provider if you need to lose weight and how much you need to lose  Ask for help with a weight loss program     · Exercise:  Begin to exercise slowly and do more as you get stronger  Talk with your healthcare provider before you start an exercise program      · Limit alcohol:  Your risk for polyps increases the more you drink  · Do not smoke: If you smoke, it is never too late to quit  Ask for information about how to stop  For support and more information:   · Laura Osuna ([facility])  [address], [place] [address]  Phone: [phone]  Web Address: www digestive  niddk nih gov    Contact your healthcare provider or gastroenterologist if:   · You have a fever  · You have chills, a cough, or feel weak and achy  · You have abdominal pain that does not go away or gets worse after you take medicine  · Your abdomen is swollen  · You are losing weight without trying  · You have questions or concerns about your condition or care  Seek care immediately or call 911 if:   · You have sudden shortness of breath       · You have a fast heart rate, fast breathing, or are too dizzy to stand up  · You have severe abdominal pain  · You see blood in your bowel movement  © Copyright [address] Information is for End User's use only and may not be sold, redistributed or otherwise used for commercial purposes  All illustrations and images included in CareNotes® are the copyrighted property of A D A M , Inc  or [facility] [employer]   The above information is an  only  It is not intended as medical advice for individual conditions or treatments  Talk to your doctor, nurse or pharmacist before following any medical regimen to see if it is safe and effective for you

## 2021-10-06 ENCOUNTER — OFFICE VISIT (OUTPATIENT)
Dept: GASTROENTEROLOGY | Facility: MEDICAL CENTER | Age: 61
End: 2021-10-06
Payer: COMMERCIAL

## 2021-10-06 VITALS
DIASTOLIC BLOOD PRESSURE: 84 MMHG | HEART RATE: 91 BPM | BODY MASS INDEX: 25.88 KG/M2 | TEMPERATURE: 98.9 F | WEIGHT: 201.6 LBS | SYSTOLIC BLOOD PRESSURE: 126 MMHG

## 2021-10-06 DIAGNOSIS — K76.0 HEPATIC STEATOSIS: Primary | ICD-10-CM

## 2021-10-06 DIAGNOSIS — D12.4 ADENOMATOUS POLYP OF DESCENDING COLON: ICD-10-CM

## 2021-10-06 PROCEDURE — 3074F SYST BP LT 130 MM HG: CPT | Performed by: PHYSICIAN ASSISTANT

## 2021-10-06 PROCEDURE — 99214 OFFICE O/P EST MOD 30 MIN: CPT | Performed by: PHYSICIAN ASSISTANT

## 2021-10-06 PROCEDURE — 1036F TOBACCO NON-USER: CPT | Performed by: PHYSICIAN ASSISTANT

## 2021-10-06 PROCEDURE — 3079F DIAST BP 80-89 MM HG: CPT | Performed by: PHYSICIAN ASSISTANT

## 2021-11-02 ENCOUNTER — LAB (OUTPATIENT)
Dept: LAB | Facility: HOSPITAL | Age: 61
End: 2021-11-02
Payer: COMMERCIAL

## 2021-11-02 DIAGNOSIS — K76.0 HEPATIC STEATOSIS: ICD-10-CM

## 2021-11-02 LAB
ALBUMIN SERPL BCP-MCNC: 3.5 G/DL (ref 3.5–5)
ALP SERPL-CCNC: 123 U/L (ref 46–116)
ALT SERPL W P-5'-P-CCNC: 60 U/L (ref 12–78)
ANION GAP SERPL CALCULATED.3IONS-SCNC: 12 MMOL/L (ref 4–13)
AST SERPL W P-5'-P-CCNC: 71 U/L (ref 5–45)
BASOPHILS # BLD AUTO: 0.09 THOUSANDS/ΜL (ref 0–0.1)
BASOPHILS NFR BLD AUTO: 1 % (ref 0–1)
BILIRUB SERPL-MCNC: 0.86 MG/DL (ref 0.2–1)
BUN SERPL-MCNC: 11 MG/DL (ref 5–25)
CALCIUM SERPL-MCNC: 9 MG/DL (ref 8.3–10.1)
CHLORIDE SERPL-SCNC: 104 MMOL/L (ref 100–108)
CO2 SERPL-SCNC: 26 MMOL/L (ref 21–32)
CREAT SERPL-MCNC: 1.37 MG/DL (ref 0.6–1.3)
EOSINOPHIL # BLD AUTO: 0.21 THOUSAND/ΜL (ref 0–0.61)
EOSINOPHIL NFR BLD AUTO: 2 % (ref 0–6)
ERYTHROCYTE [DISTWIDTH] IN BLOOD BY AUTOMATED COUNT: 18.5 % (ref 11.6–15.1)
GFR SERPL CREATININE-BSD FRML MDRD: 55 ML/MIN/1.73SQ M
GLUCOSE SERPL-MCNC: 98 MG/DL (ref 65–140)
HCT VFR BLD AUTO: 41.8 % (ref 36.5–49.3)
HGB BLD-MCNC: 13.5 G/DL (ref 12–17)
IMM GRANULOCYTES # BLD AUTO: 0.02 THOUSAND/UL (ref 0–0.2)
IMM GRANULOCYTES NFR BLD AUTO: 0 % (ref 0–2)
INR PPP: 1.07 (ref 0.84–1.19)
LYMPHOCYTES # BLD AUTO: 2.22 THOUSANDS/ΜL (ref 0.6–4.47)
LYMPHOCYTES NFR BLD AUTO: 24 % (ref 14–44)
MCH RBC QN AUTO: 28.5 PG (ref 26.8–34.3)
MCHC RBC AUTO-ENTMCNC: 32.3 G/DL (ref 31.4–37.4)
MCV RBC AUTO: 88 FL (ref 82–98)
MONOCYTES # BLD AUTO: 0.96 THOUSAND/ΜL (ref 0.17–1.22)
MONOCYTES NFR BLD AUTO: 11 % (ref 4–12)
NEUTROPHILS # BLD AUTO: 5.65 THOUSANDS/ΜL (ref 1.85–7.62)
NEUTS SEG NFR BLD AUTO: 62 % (ref 43–75)
NRBC BLD AUTO-RTO: 0 /100 WBCS
PLATELET # BLD AUTO: 319 THOUSANDS/UL (ref 149–390)
PMV BLD AUTO: 10.3 FL (ref 8.9–12.7)
POTASSIUM SERPL-SCNC: 3.5 MMOL/L (ref 3.5–5.3)
PROT SERPL-MCNC: 8.2 G/DL (ref 6.4–8.2)
PROTHROMBIN TIME: 13.6 SECONDS (ref 11.6–14.5)
RBC # BLD AUTO: 4.73 MILLION/UL (ref 3.88–5.62)
SODIUM SERPL-SCNC: 142 MMOL/L (ref 136–145)
WBC # BLD AUTO: 9.15 THOUSAND/UL (ref 4.31–10.16)

## 2021-11-02 PROCEDURE — 85025 COMPLETE CBC W/AUTO DIFF WBC: CPT

## 2021-11-02 PROCEDURE — 85610 PROTHROMBIN TIME: CPT

## 2021-11-02 PROCEDURE — 80053 COMPREHEN METABOLIC PANEL: CPT

## 2021-11-02 PROCEDURE — 36415 COLL VENOUS BLD VENIPUNCTURE: CPT

## 2021-12-09 ENCOUNTER — RA CDI HCC (OUTPATIENT)
Dept: OTHER | Facility: HOSPITAL | Age: 61
End: 2021-12-09

## 2021-12-10 PROBLEM — N18.31 CHRONIC KIDNEY DISEASE, STAGE 3A (HCC): Status: ACTIVE | Noted: 2021-12-10

## 2021-12-29 ENCOUNTER — OFFICE VISIT (OUTPATIENT)
Dept: FAMILY MEDICINE CLINIC | Facility: CLINIC | Age: 61
End: 2021-12-29
Payer: COMMERCIAL

## 2021-12-29 VITALS
DIASTOLIC BLOOD PRESSURE: 82 MMHG | HEIGHT: 74 IN | WEIGHT: 198 LBS | SYSTOLIC BLOOD PRESSURE: 130 MMHG | TEMPERATURE: 97.9 F | BODY MASS INDEX: 25.41 KG/M2

## 2021-12-29 DIAGNOSIS — Z12.5 SCREENING FOR PROSTATE CANCER: ICD-10-CM

## 2021-12-29 DIAGNOSIS — I10 ESSENTIAL HYPERTENSION: ICD-10-CM

## 2021-12-29 DIAGNOSIS — R91.1 LUNG NODULE SEEN ON IMAGING STUDY: ICD-10-CM

## 2021-12-29 DIAGNOSIS — N18.31 CHRONIC KIDNEY DISEASE, STAGE 3A (HCC): ICD-10-CM

## 2021-12-29 DIAGNOSIS — E66.3 OVERWEIGHT: ICD-10-CM

## 2021-12-29 DIAGNOSIS — E78.2 MIXED HYPERLIPIDEMIA: Primary | ICD-10-CM

## 2021-12-29 DIAGNOSIS — K76.0 HEPATIC STEATOSIS: ICD-10-CM

## 2021-12-29 DIAGNOSIS — E03.9 ACQUIRED HYPOTHYROIDISM: ICD-10-CM

## 2021-12-29 PROCEDURE — 3075F SYST BP GE 130 - 139MM HG: CPT | Performed by: FAMILY MEDICINE

## 2021-12-29 PROCEDURE — 3079F DIAST BP 80-89 MM HG: CPT | Performed by: FAMILY MEDICINE

## 2021-12-29 PROCEDURE — 99214 OFFICE O/P EST MOD 30 MIN: CPT | Performed by: FAMILY MEDICINE

## 2021-12-29 PROCEDURE — 3008F BODY MASS INDEX DOCD: CPT | Performed by: FAMILY MEDICINE

## 2021-12-29 PROCEDURE — 1036F TOBACCO NON-USER: CPT | Performed by: FAMILY MEDICINE

## 2021-12-29 PROCEDURE — 3725F SCREEN DEPRESSION PERFORMED: CPT | Performed by: FAMILY MEDICINE

## 2022-01-01 DIAGNOSIS — E78.2 MIXED HYPERLIPIDEMIA: Primary | ICD-10-CM

## 2022-01-01 DIAGNOSIS — R91.8 ABNORMAL CT LUNG SCREENING: Primary | ICD-10-CM

## 2022-01-01 RX ORDER — ATORVASTATIN CALCIUM 80 MG/1
80 TABLET, FILM COATED ORAL DAILY
Qty: 90 TABLET | Refills: 1 | Status: SHIPPED | OUTPATIENT
Start: 2022-01-01 | End: 2022-01-01

## 2022-02-16 ENCOUNTER — OFFICE VISIT (OUTPATIENT)
Dept: GASTROENTEROLOGY | Facility: MEDICAL CENTER | Age: 62
End: 2022-02-16
Payer: COMMERCIAL

## 2022-02-16 VITALS
BODY MASS INDEX: 26.12 KG/M2 | TEMPERATURE: 96.1 F | DIASTOLIC BLOOD PRESSURE: 86 MMHG | SYSTOLIC BLOOD PRESSURE: 131 MMHG | WEIGHT: 203.4 LBS | HEART RATE: 88 BPM

## 2022-02-16 DIAGNOSIS — K74.00 HEPATIC FIBROSIS: Primary | ICD-10-CM

## 2022-02-16 PROCEDURE — 3079F DIAST BP 80-89 MM HG: CPT | Performed by: PHYSICIAN ASSISTANT

## 2022-02-16 PROCEDURE — 3075F SYST BP GE 130 - 139MM HG: CPT | Performed by: PHYSICIAN ASSISTANT

## 2022-02-16 PROCEDURE — 99213 OFFICE O/P EST LOW 20 MIN: CPT | Performed by: PHYSICIAN ASSISTANT

## 2022-02-16 PROCEDURE — 1036F TOBACCO NON-USER: CPT | Performed by: PHYSICIAN ASSISTANT
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
## 2022-04-04 ENCOUNTER — APPOINTMENT (OUTPATIENT)
Dept: LAB | Facility: HOSPITAL | Age: 62
End: 2022-04-04
Payer: COMMERCIAL

## 2022-04-04 DIAGNOSIS — N18.31 CHRONIC KIDNEY DISEASE, STAGE 3A (HCC): ICD-10-CM

## 2022-04-04 DIAGNOSIS — E03.9 ACQUIRED HYPOTHYROIDISM: ICD-10-CM

## 2022-04-04 DIAGNOSIS — Z12.5 SCREENING FOR PROSTATE CANCER: ICD-10-CM

## 2022-04-04 DIAGNOSIS — I10 ESSENTIAL HYPERTENSION: ICD-10-CM

## 2022-04-04 DIAGNOSIS — E78.2 MIXED HYPERLIPIDEMIA: ICD-10-CM

## 2022-04-04 LAB
ALBUMIN SERPL BCP-MCNC: 3.4 G/DL (ref 3.5–5)
ALP SERPL-CCNC: 114 U/L (ref 46–116)
ALT SERPL W P-5'-P-CCNC: 48 U/L (ref 12–78)
ANION GAP SERPL CALCULATED.3IONS-SCNC: 13 MMOL/L (ref 4–13)
AST SERPL W P-5'-P-CCNC: 60 U/L (ref 5–45)
BILIRUB SERPL-MCNC: 0.54 MG/DL (ref 0.2–1)
BUN SERPL-MCNC: 12 MG/DL (ref 5–25)
CALCIUM ALBUM COR SERPL-MCNC: 8.9 MG/DL (ref 8.3–10.1)
CALCIUM SERPL-MCNC: 8.4 MG/DL (ref 8.3–10.1)
CHLORIDE SERPL-SCNC: 105 MMOL/L (ref 100–108)
CHOLEST SERPL-MCNC: 156 MG/DL
CO2 SERPL-SCNC: 26 MMOL/L (ref 21–32)
CREAT SERPL-MCNC: 1.16 MG/DL (ref 0.6–1.3)
GFR SERPL CREATININE-BSD FRML MDRD: 67 ML/MIN/1.73SQ M
GLUCOSE P FAST SERPL-MCNC: 108 MG/DL (ref 65–99)
HDLC SERPL-MCNC: 46 MG/DL
NONHDLC SERPL-MCNC: 110 MG/DL
POTASSIUM SERPL-SCNC: 3.5 MMOL/L (ref 3.5–5.3)
PROT SERPL-MCNC: 8.2 G/DL (ref 6.4–8.2)
PSA SERPL-MCNC: 0.5 NG/ML (ref 0–4)
SODIUM SERPL-SCNC: 144 MMOL/L (ref 136–145)
TRIGL SERPL-MCNC: 478 MG/DL
TSH SERPL DL<=0.05 MIU/L-ACNC: 3.3 UIU/ML (ref 0.45–4.5)

## 2022-04-04 PROCEDURE — 80053 COMPREHEN METABOLIC PANEL: CPT

## 2022-04-04 PROCEDURE — G0103 PSA SCREENING: HCPCS

## 2022-04-04 PROCEDURE — 80061 LIPID PANEL: CPT

## 2022-04-04 PROCEDURE — 84443 ASSAY THYROID STIM HORMONE: CPT

## 2022-04-04 PROCEDURE — 36415 COLL VENOUS BLD VENIPUNCTURE: CPT

## 2022-04-27 DIAGNOSIS — I10 ESSENTIAL HYPERTENSION: ICD-10-CM

## 2022-04-27 RX ORDER — FUROSEMIDE 20 MG/1
TABLET ORAL
Qty: 90 TABLET | Refills: 1 | Status: SHIPPED | OUTPATIENT
Start: 2022-04-27

## 2022-05-09 DIAGNOSIS — E03.9 ACQUIRED HYPOTHYROIDISM: ICD-10-CM

## 2022-05-09 RX ORDER — LEVOTHYROXINE SODIUM 0.07 MG/1
TABLET ORAL
Qty: 90 TABLET | Refills: 1 | Status: SHIPPED | OUTPATIENT
Start: 2022-05-09

## 2022-05-31 ENCOUNTER — TELEPHONE (OUTPATIENT)
Dept: FAMILY MEDICINE CLINIC | Facility: CLINIC | Age: 62
End: 2022-05-31

## 2022-05-31 DIAGNOSIS — E78.2 MIXED HYPERLIPIDEMIA: ICD-10-CM

## 2022-05-31 RX ORDER — ATORVASTATIN CALCIUM 40 MG/1
40 TABLET, FILM COATED ORAL DAILY
Qty: 90 TABLET | Refills: 1 | Status: SHIPPED | OUTPATIENT
Start: 2022-05-31 | End: 2022-05-31

## 2022-06-01 DIAGNOSIS — I10 ESSENTIAL HYPERTENSION: ICD-10-CM

## 2022-06-01 RX ORDER — AMLODIPINE BESYLATE 10 MG/1
TABLET ORAL
Qty: 90 TABLET | Refills: 1 | Status: SHIPPED | OUTPATIENT
Start: 2022-06-01

## 2022-06-20 ENCOUNTER — PATIENT OUTREACH (OUTPATIENT)
Dept: FAMILY MEDICINE CLINIC | Facility: CLINIC | Age: 62
End: 2022-06-20

## 2022-06-20 ENCOUNTER — EPISODE CHANGES (OUTPATIENT)
Dept: CASE MANAGEMENT | Facility: OTHER | Age: 62
End: 2022-06-20

## 2022-06-21 ENCOUNTER — RA CDI HCC (OUTPATIENT)
Dept: OTHER | Facility: HOSPITAL | Age: 62
End: 2022-06-21

## 2022-06-21 NOTE — PROGRESS NOTES
[facility]  coding opportunities       Chart reviewed, no opportunity found:   [address]        Patients Insurance     Medicare Insurance: [facility]

## 2022-06-28 ENCOUNTER — OFFICE VISIT (OUTPATIENT)
Dept: FAMILY MEDICINE CLINIC | Facility: CLINIC | Age: 62
End: 2022-06-28
Payer: COMMERCIAL

## 2022-06-28 VITALS
BODY MASS INDEX: 26.77 KG/M2 | WEIGHT: 208.6 LBS | DIASTOLIC BLOOD PRESSURE: 78 MMHG | SYSTOLIC BLOOD PRESSURE: 130 MMHG | TEMPERATURE: 97.8 F | HEIGHT: 74 IN

## 2022-06-28 DIAGNOSIS — E03.9 ACQUIRED HYPOTHYROIDISM: ICD-10-CM

## 2022-06-28 DIAGNOSIS — K74.00 HEPATIC FIBROSIS: ICD-10-CM

## 2022-06-28 DIAGNOSIS — Z11.4 SCREENING FOR HIV (HUMAN IMMUNODEFICIENCY VIRUS): ICD-10-CM

## 2022-06-28 DIAGNOSIS — Z12.2 ENCOUNTER FOR SCREENING FOR LUNG CANCER: ICD-10-CM

## 2022-06-28 DIAGNOSIS — R91.1 LUNG NODULE SEEN ON IMAGING STUDY: ICD-10-CM

## 2022-06-28 DIAGNOSIS — D12.4 ADENOMATOUS POLYP OF DESCENDING COLON: ICD-10-CM

## 2022-06-28 DIAGNOSIS — N18.31 CHRONIC KIDNEY DISEASE, STAGE 3A (HCC): ICD-10-CM

## 2022-06-28 DIAGNOSIS — Z87.891 PERSONAL HISTORY OF NICOTINE DEPENDENCE: ICD-10-CM

## 2022-06-28 DIAGNOSIS — I10 ESSENTIAL HYPERTENSION: Primary | ICD-10-CM

## 2022-06-28 DIAGNOSIS — E66.3 OVERWEIGHT: ICD-10-CM

## 2022-06-28 DIAGNOSIS — J42 CHRONIC BRONCHITIS, UNSPECIFIED CHRONIC BRONCHITIS TYPE (HCC): ICD-10-CM

## 2022-06-28 DIAGNOSIS — E78.2 MIXED HYPERLIPIDEMIA: ICD-10-CM

## 2022-06-28 PROCEDURE — 3008F BODY MASS INDEX DOCD: CPT | Performed by: FAMILY MEDICINE

## 2022-06-28 PROCEDURE — 99214 OFFICE O/P EST MOD 30 MIN: CPT | Performed by: FAMILY MEDICINE

## 2022-06-28 PROCEDURE — 3075F SYST BP GE 130 - 139MM HG: CPT | Performed by: FAMILY MEDICINE

## 2022-06-28 PROCEDURE — 3078F DIAST BP <80 MM HG: CPT | Performed by: FAMILY MEDICINE

## 2022-06-28 PROCEDURE — 1036F TOBACCO NON-USER: CPT | Performed by: FAMILY MEDICINE

## 2022-06-28 NOTE — PROGRESS NOTES
Assessment/Plan:    Hepatic fibrosis  followup with GI Continue to avoid alcohol Reviewed labs from April    Chronic obstructive pulmonary disease ([facility] [address] )  Breathing is stable He quit tobacco in 2015 Flu shot in fall    Adenomatous polyp of descending colon  colonscopy due next year    Essential hypertension  Blood pressure is controlled on medication continue medication Followup in 6 months     Mixed hyperlipidemia  reveiwed labs from4/2022 His triglycerides are too high Discussed diet with him    Acquired hypothyroidism  Thyroid numbers are stable Patient to conitnue with meds and repeat labs in fall I will see him in 6 months     Overweight  Weight is stable continue current care    Lung nodule seen on imaging study  Repeat CT scan    Chronic kidney disease, stage 3a [facility])  Lab Results   Component Value Date    EGFR 67 04/04/2022    EGFR 55 11/02/2021    EGFR 80 06/08/2021    CREATININE 1 16 04/04/2022    CREATININE 1 37 (H) 11/02/2021    CREATININE 1 01 06/08/2021   renal function stable Stay hydrated and avoid NSAIDS       Diagnoses and all orders for this visit:    Essential hypertension  -     Comprehensive metabolic panel; Future  -     Lipid panel; Future    Screening for HIV (human immunodeficiency virus)  -     HIV 1/2 Antigen/Antibody (4th Generation) w Reflex [facility]; Future    Encounter for screening for lung cancer    Personal history of nicotine dependence    Mixed hyperlipidemia  -     Comprehensive metabolic panel; Future  -     Lipid panel; Future    Acquired hypothyroidism  -     TSH, 3rd generation with Free T4 reflex;  Future    Overweight    Chronic bronchitis, unspecified chronic bronchitis type ([facility] [address] )  -     CT chest wo contrast; Future    Lung nodule seen on imaging study  -     CT chest wo contrast; Future    Hepatic fibrosis    Adenomatous polyp of descending colon    Chronic kidney disease, stage 3a (HCC)          Subjective:   Chief Complaint   Patient presents with    Hypertension    Hyperlipidemia    Hypothyroidism    COPD     No refills needed           Patient ID: Bisi Aleman is a 57 y o  male  Patient is here for follow up of hypertension hypothyroidism copd lung nodule on CT in high risk patient ( 40 pack years quit in 2015) chronic kidney disease overweight and also hepatic fibrosis He is taking all meds Patient blood pressure is good He feels his breathing is fine He is not on any inhalers His last CT scan of lungs was in 7/2021 So he needs repeat now Patient weight is table he is avoiding alcohol He is seeing the GI doctors shortly for follow up of his liver Colonoscopy is due next year Patietnt weight is stable Patient feels well He has no complaints       The following portions of the patient's history were reviewed and updated as appropriate: allergies, current medications, past family history, past medical history, past social history, past surgical history and problem list     Review of Systems   Constitutional: Negative for fatigue, fever and unexpected weight change  HENT: Negative for congestion, sinus pain and trouble swallowing  Eyes: Negative for discharge and visual disturbance  Respiratory: Negative for cough, chest tightness, shortness of breath and wheezing  Cardiovascular: Negative for chest pain, palpitations and leg swelling  Gastrointestinal: Negative for abdominal pain, blood in stool, constipation, diarrhea, nausea and vomiting  Genitourinary: Negative for difficulty urinating, dysuria, frequency and hematuria  Musculoskeletal: Negative for arthralgias, gait problem and joint swelling  Skin: Negative for rash and wound  Allergic/Immunologic: Negative for environmental allergies and food allergies  Neurological: Negative for dizziness, syncope, weakness, numbness and headaches  Hematological: Negative for adenopathy  Does not bruise/bleed easily     Psychiatric/Behavioral: Negative for confusion, decreased concentration and sleep disturbance  The patient is not nervous/anxious  Objective:      /78   Temp 97 8 °F (36 6 °C)   Ht 6' 2" (1 88 m)   Wt 94 6 kg (208 lb 9 6 oz)   BMI 26 78 kg/m²          Physical Exam  Vitals and nursing note reviewed  Constitutional:       Appearance: Normal appearance  He is well-developed  HENT:      Head: Normocephalic and atraumatic  Right Ear: Hearing, tympanic membrane and external ear normal       Left Ear: Hearing, tympanic membrane and external ear normal    Eyes:      Extraocular Movements: Extraocular movements intact  Conjunctiva/sclera: Conjunctivae normal       Pupils: Pupils are equal, round, and reactive to light  Neck:      Thyroid: No thyromegaly  Cardiovascular:      Rate and Rhythm: Normal rate and regular rhythm  Heart sounds: Normal heart sounds  Pulmonary:      Effort: Pulmonary effort is normal       Breath sounds: Normal breath sounds  No wheezing or rales  Abdominal:      General: Bowel sounds are normal  There is no distension  Palpations: Abdomen is soft  Tenderness: There is no abdominal tenderness  Musculoskeletal:         General: No tenderness  Cervical back: Neck supple  Lymphadenopathy:      Cervical: No cervical adenopathy  Skin:     General: Skin is warm and dry  Findings: No rash  Neurological:      General: No focal deficit present  Mental Status: He is alert and oriented to person, place, and time  Cranial Nerves: No cranial nerve deficit  Coordination: Coordination normal    Psychiatric:         Mood and Affect: Mood normal          Behavior: Behavior normal          Thought Content:  Thought content normal          Judgment: Judgment normal

## 2022-06-28 NOTE — PATIENT INSTRUCTIONS
Chronic Hypertension   AMBULATORY CARE:   Hypertension  is high blood pressure  Your blood pressure is the force of your blood moving against the walls of your arteries  Hypertension causes your blood pressure to get so high that your heart has to work much harder than normal  This can damage your heart  Even if you have hypertension for years, lifestyle changes, medicines, or both can help bring your blood pressure to normal   Call your local emergency number (911 in the [address] [facility]) or have someone call if:   You have chest pain  You have any of the following signs of a heart attack:      Squeezing, pressure, or pain in your chest    You may  also have any of the following:     Discomfort or pain in your back, neck, jaw, stomach, or arm    Shortness of breath    Nausea or vomiting    Lightheadedness or a sudden cold sweat    You become confused or have difficulty speaking  You suddenly feel lightheaded or have trouble breathing  Seek care immediately if:   You have a severe headache or vision loss  You have weakness in an arm or leg  Call your doctor or cardiologist if:   You feel faint, dizzy, confused, or drowsy  You have been taking your blood pressure medicine but your pressure is higher than your provider says it should be  You have questions or concerns about your condition or care  Treatment for chronic hypertension  may include medicine to lower your blood pressure and cholesterol levels  A low cholesterol level helps prevent heart disease and makes it easier to control your blood pressure  Heart disease can make your blood pressure harder to control  You may also need to make lifestyle changes  What you need to know about the stages of hypertension:       Normal blood pressure is 119/79 or lower   Your healthcare provider may only check your blood pressure each year if it stays at a normal level  Elevated blood pressure is 120/79 to 129/79   This is sometimes called prehypertension  Your healthcare provider may suggest lifestyle changes to help lower your blood pressure to a normal level  He or she may then check it again in 3 to 6 months  Stage 1 hypertension is 130/80  to 139/89   Your provider may recommend lifestyle changes, medication, and checks every 3 to 6 months until your blood pressure is controlled  Stage 2 hypertension is 140/90 or higher   Your provider will recommend lifestyle changes and have you take 2 kinds of hypertension medicines  You will also need to have your blood pressure checked monthly until it is controlled  Manage chronic hypertension:   Check your blood pressure at home  Avoid smoking, caffeine, and exercise at least 30 minutes before checking your blood pressure  Sit and rest for 5 minutes before you take your blood pressure  Extend your arm and support it on a flat surface  Your arm should be at the same level as your heart  Follow the directions that came with your blood pressure monitor  Check your blood pressure 2 times, 1 minute apart, before you take your medicine in the morning  Also check your blood pressure before your evening meal  Keep a record of your readings and bring it to your follow-up visits  Ask your healthcare provider what your blood pressure should be  Manage any other health conditions you have  Health conditions such as diabetes can increase your risk for hypertension  Follow your healthcare provider's instructions and take all your medicines as directed  Talk to your healthcare provider about any new health conditions you have recently developed  Ask about all medicines  Certain medicines can increase your blood pressure  Examples include oral birth control pills, decongestants, herbal supplements, and NSAIDs, such as ibuprofen  Your healthcare provider can tell you which medicines are safe for you to take  This includes prescription and over-the-counter medicines      Lifestyle changes you can make to lower your blood pressure: Your provider may want you to make more lifestyle changes if you are having trouble controlling your blood pressure  This may feel difficult over time, especially if you think you are making good changes but your pressure is still high  It might help to focus on one new change at a time  For example, try to add 1 more day of exercise, or exercise for an extra 10 minutes on 2 days  Small changes can make a big difference  Your healthcare provider can also refer you to specialists such as a dietitian who can help you make small changes  Your family members may be included in helping you learn to create lifestyle changes, such as the following:     Limit sodium (salt) as directed  Too much sodium can affect your fluid balance  Check labels to find low-sodium or no-salt-added foods  Some low-sodium foods use potassium salts for flavor  Too much potassium can also cause health problems  Your healthcare provider will tell you how much sodium and potassium are safe for you to have in a day  He or she may recommend that you limit sodium to 2,300 mg a day  Follow the meal plan recommended by your healthcare provider  A dietitian or your provider can give you more information on low-sodium plans or the DASH (Dietary Approaches to Stop Hypertension) eating plan  The DASH plan is low in sodium, processed sugar, unhealthy fats, and total fat  It is high in potassium, calcium, and fiber  These can be found in vegetables, fruit, and whole-grain foods  Be physically active throughout the day  Physical activity, such as exercise, can help control your blood pressure and your weight  Be physically active for at least 30 minutes per day, on most days of the week  Include aerobic activity, such as walking or riding a bicycle  Also include strength training at least 2 times each week  Your healthcare providers can help you create a physical activity plan  Decrease stress    This may help lower your blood pressure  Learn ways to relax, such as deep breathing or listening to music  Limit alcohol as directed  Alcohol can increase your blood pressure  A drink of alcohol is 12 ounces of beer, 5 ounces of wine, or 1½ ounces of liquor  Do not smoke  Nicotine and other chemicals in cigarettes and cigars can increase your blood pressure and also cause lung damage  Ask your healthcare provider for information if you currently smoke and need help to quit  E-cigarettes or smokeless tobacco still contain nicotine  Talk to your healthcare provider before you use these products  Follow up with your doctor or cardiologist as directed: You will need to return to have your blood pressure checked and to have other lab tests done  Write down your questions so you remember to ask them during your visits  © Copyright [employer] 2022 Information is for End User's use only and may not be sold, redistributed or otherwise used for commercial purposes  All illustrations and images included in CareNotes® are the copyrighted property of [employer] Inc  or [facility]   The above information is an  only  It is not intended as medical advice for individual conditions or treatments  Talk to your doctor, nurse or pharmacist before following any medical regimen to see if it is safe and effective for you

## 2022-06-28 NOTE — ASSESSMENT & PLAN NOTE
Lab Results   Component Value Date    EGFR 67 04/04/2022    EGFR 55 11/02/2021    EGFR 80 06/08/2021    CREATININE 1 16 04/04/2022    CREATININE 1 37 (H) 11/02/2021    CREATININE 1 01 06/08/2021   renal function stable Stay hydrated and avoid NSAIDS

## 2022-06-28 NOTE — ASSESSMENT & PLAN NOTE
Thyroid numbers are stable Patient to ruby with meds and repeat labs in fall I will see him in 6 months

## 2022-07-22 ENCOUNTER — HOSPITAL ENCOUNTER (OUTPATIENT)
Dept: CT IMAGING | Facility: HOSPITAL | Age: 62
Discharge: HOME | End: 2022-07-22

## 2022-07-22 DIAGNOSIS — J42 CHRONIC BRONCHITIS, UNSPECIFIED CHRONIC BRONCHITIS TYPE (HCC): ICD-10-CM

## 2022-07-22 DIAGNOSIS — R91.1 LUNG NODULE SEEN ON IMAGING STUDY: ICD-10-CM

## 2022-07-22 PROCEDURE — 71250 CT THORAX DX C-: CPT

## 2022-07-22 PROCEDURE — G1004 CDSM NDSC: HCPCS

## 2022-08-29 ENCOUNTER — OFFICE VISIT (OUTPATIENT)
Dept: GASTROENTEROLOGY | Facility: MEDICAL CENTER | Age: 62
End: 2022-08-29
Payer: COMMERCIAL

## 2022-08-29 VITALS
SYSTOLIC BLOOD PRESSURE: 153 MMHG | BODY MASS INDEX: 27.27 KG/M2 | HEART RATE: 100 BPM | DIASTOLIC BLOOD PRESSURE: 83 MMHG | WEIGHT: 212.4 LBS | TEMPERATURE: 98.7 F

## 2022-08-29 DIAGNOSIS — K74.00 HEPATIC FIBROSIS: Primary | ICD-10-CM

## 2022-08-29 PROCEDURE — 99213 OFFICE O/P EST LOW 20 MIN: CPT | Performed by: PHYSICIAN ASSISTANT
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
## 2022-09-13 ENCOUNTER — CONSULT (OUTPATIENT)
Dept: PULMONOLOGY | Facility: CLINIC | Age: 62
End: 2022-09-13
Payer: COMMERCIAL

## 2022-09-13 VITALS
BODY MASS INDEX: 26.82 KG/M2 | TEMPERATURE: 98.9 F | SYSTOLIC BLOOD PRESSURE: 150 MMHG | DIASTOLIC BLOOD PRESSURE: 84 MMHG | HEART RATE: 97 BPM | WEIGHT: 209 LBS | HEIGHT: 74 IN | OXYGEN SATURATION: 96 %

## 2022-09-13 DIAGNOSIS — Z86.718 HISTORY OF DVT (DEEP VEIN THROMBOSIS): ICD-10-CM

## 2022-09-13 DIAGNOSIS — Z87.891 HISTORY OF TOBACCO ABUSE: ICD-10-CM

## 2022-09-13 DIAGNOSIS — R91.8 GROUND GLASS OPACITY PRESENT ON IMAGING OF LUNG: Primary | ICD-10-CM

## 2022-09-13 DIAGNOSIS — K70.30 ALCOHOLIC CIRRHOSIS OF LIVER WITHOUT ASCITES (HCC): ICD-10-CM

## 2022-09-13 PROCEDURE — 3077F SYST BP >= 140 MM HG: CPT | Performed by: INTERNAL MEDICINE

## 2022-09-13 PROCEDURE — 99204 OFFICE O/P NEW MOD 45 MIN: CPT | Performed by: INTERNAL MEDICINE

## 2022-09-13 PROCEDURE — 3079F DIAST BP 80-89 MM HG: CPT | Performed by: INTERNAL MEDICINE
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
## 2022-09-22 DIAGNOSIS — E87.6 HYPOKALEMIA: ICD-10-CM

## 2022-09-22 RX ORDER — POTASSIUM CHLORIDE 750 MG/1
CAPSULE, EXTENDED RELEASE ORAL
Qty: 90 CAPSULE | Refills: 1 | Status: SHIPPED | OUTPATIENT
Start: 2022-09-22

## 2022-10-05 ENCOUNTER — LAB (OUTPATIENT)
Dept: LAB | Facility: HOSPITAL | Age: 62
End: 2022-10-05
Payer: COMMERCIAL

## 2022-10-05 DIAGNOSIS — Z11.4 SCREENING FOR HIV (HUMAN IMMUNODEFICIENCY VIRUS): ICD-10-CM

## 2022-10-05 DIAGNOSIS — K74.00 HEPATIC FIBROSIS: ICD-10-CM

## 2022-10-05 DIAGNOSIS — E78.2 MIXED HYPERLIPIDEMIA: ICD-10-CM

## 2022-10-05 DIAGNOSIS — E03.9 ACQUIRED HYPOTHYROIDISM: ICD-10-CM

## 2022-10-05 DIAGNOSIS — I10 ESSENTIAL HYPERTENSION: ICD-10-CM

## 2022-10-05 LAB
ALBUMIN SERPL BCP-MCNC: 3.3 G/DL (ref 3.5–5)
ALP SERPL-CCNC: 91 U/L (ref 46–116)
ALT SERPL W P-5'-P-CCNC: 32 U/L (ref 12–78)
ANION GAP SERPL CALCULATED.3IONS-SCNC: 8 MMOL/L (ref 4–13)
AST SERPL W P-5'-P-CCNC: 41 U/L (ref 5–45)
BASOPHILS # BLD AUTO: 0.09 THOUSANDS/ΜL (ref 0–0.1)
BASOPHILS NFR BLD AUTO: 1 % (ref 0–1)
BILIRUB DIRECT SERPL-MCNC: 0.18 MG/DL (ref 0–0.2)
BILIRUB SERPL-MCNC: 0.45 MG/DL (ref 0.2–1)
BUN SERPL-MCNC: 12 MG/DL (ref 5–25)
CALCIUM ALBUM COR SERPL-MCNC: 9.5 MG/DL (ref 8.3–10.1)
CALCIUM SERPL-MCNC: 8.9 MG/DL (ref 8.3–10.1)
CHLORIDE SERPL-SCNC: 104 MMOL/L (ref 96–108)
CHOLEST SERPL-MCNC: 110 MG/DL
CO2 SERPL-SCNC: 28 MMOL/L (ref 21–32)
CREAT SERPL-MCNC: 1.08 MG/DL (ref 0.6–1.3)
EOSINOPHIL # BLD AUTO: 0.7 THOUSAND/ΜL (ref 0–0.61)
EOSINOPHIL NFR BLD AUTO: 7 % (ref 0–6)
ERYTHROCYTE [DISTWIDTH] IN BLOOD BY AUTOMATED COUNT: 19.3 % (ref 11.6–15.1)
GFR SERPL CREATININE-BSD FRML MDRD: 72 ML/MIN/1.73SQ M
GLUCOSE P FAST SERPL-MCNC: 93 MG/DL (ref 65–99)
HCT VFR BLD AUTO: 34.8 % (ref 36.5–49.3)
HDLC SERPL-MCNC: 71 MG/DL
HGB BLD-MCNC: 10.6 G/DL (ref 12–17)
IMM GRANULOCYTES # BLD AUTO: 0.02 THOUSAND/UL (ref 0–0.2)
IMM GRANULOCYTES NFR BLD AUTO: 0 % (ref 0–2)
INR PPP: 1.09 (ref 0.84–1.19)
LDLC SERPL CALC-MCNC: 29 MG/DL (ref 0–100)
LYMPHOCYTES # BLD AUTO: 3.8 THOUSANDS/ΜL (ref 0.6–4.47)
LYMPHOCYTES NFR BLD AUTO: 38 % (ref 14–44)
MCH RBC QN AUTO: 24.7 PG (ref 26.8–34.3)
MCHC RBC AUTO-ENTMCNC: 30.5 G/DL (ref 31.4–37.4)
MCV RBC AUTO: 81 FL (ref 82–98)
MONOCYTES # BLD AUTO: 1.2 THOUSAND/ΜL (ref 0.17–1.22)
MONOCYTES NFR BLD AUTO: 12 % (ref 4–12)
NEUTROPHILS # BLD AUTO: 4.2 THOUSANDS/ΜL (ref 1.85–7.62)
NEUTS SEG NFR BLD AUTO: 42 % (ref 43–75)
NONHDLC SERPL-MCNC: 39 MG/DL
NRBC BLD AUTO-RTO: 0 /100 WBCS
PLATELET # BLD AUTO: 292 THOUSANDS/UL (ref 149–390)
PMV BLD AUTO: 9.9 FL (ref 8.9–12.7)
POTASSIUM SERPL-SCNC: 3.7 MMOL/L (ref 3.5–5.3)
PROT SERPL-MCNC: 8.2 G/DL (ref 6.4–8.4)
PROTHROMBIN TIME: 14.1 SECONDS (ref 11.6–14.5)
RBC # BLD AUTO: 4.3 MILLION/UL (ref 3.88–5.62)
SODIUM SERPL-SCNC: 140 MMOL/L (ref 135–147)
T4 FREE SERPL-MCNC: 0.95 NG/DL (ref 0.76–1.46)
TRIGL SERPL-MCNC: 49 MG/DL
TSH SERPL DL<=0.05 MIU/L-ACNC: 5.06 UIU/ML (ref 0.45–4.5)
WBC # BLD AUTO: 10.01 THOUSAND/UL (ref 4.31–10.16)

## 2022-10-05 PROCEDURE — 85025 COMPLETE CBC W/AUTO DIFF WBC: CPT

## 2022-10-05 PROCEDURE — 80061 LIPID PANEL: CPT

## 2022-10-05 PROCEDURE — 82248 BILIRUBIN DIRECT: CPT

## 2022-10-05 PROCEDURE — 85610 PROTHROMBIN TIME: CPT

## 2022-10-05 PROCEDURE — 36415 COLL VENOUS BLD VENIPUNCTURE: CPT

## 2022-10-05 PROCEDURE — 84443 ASSAY THYROID STIM HORMONE: CPT

## 2022-10-05 PROCEDURE — 84439 ASSAY OF FREE THYROXINE: CPT

## 2022-10-05 PROCEDURE — 80053 COMPREHEN METABOLIC PANEL: CPT

## 2022-10-05 PROCEDURE — 87389 HIV-1 AG W/HIV-1&-2 AB AG IA: CPT

## 2022-10-05 NOTE — RESULT ENCOUNTER NOTE
Your liver enzymes are now normal but your blood count has decreased  Are you seeing any blood in the stool, black or red? Feeling dizzy or lightheaded?   Maria Elena Montero

## 2022-10-06 LAB — HIV 1+2 AB+HIV1 P24 AG SERPL QL IA: NORMAL

## 2022-10-12 PROBLEM — Z00.00 MEDICARE ANNUAL WELLNESS VISIT, SUBSEQUENT: Status: RESOLVED | Noted: 2020-12-29 | Resolved: 2022-10-12

## 2022-10-12 PROBLEM — Z12.5 SCREENING FOR PROSTATE CANCER: Status: RESOLVED | Noted: 2019-09-09 | Resolved: 2022-10-12

## 2022-10-19 DIAGNOSIS — I10 ESSENTIAL HYPERTENSION: ICD-10-CM

## 2022-10-19 RX ORDER — FUROSEMIDE 20 MG/1
TABLET ORAL
Qty: 90 TABLET | Refills: 1 | Status: SHIPPED | OUTPATIENT
Start: 2022-10-19

## 2022-11-07 DIAGNOSIS — E03.9 ACQUIRED HYPOTHYROIDISM: ICD-10-CM

## 2022-11-07 RX ORDER — LEVOTHYROXINE SODIUM 0.07 MG/1
TABLET ORAL
Qty: 90 TABLET | Refills: 1 | Status: SHIPPED | OUTPATIENT
Start: 2022-11-07

## 2022-11-14 ENCOUNTER — HOSPITAL ENCOUNTER (OUTPATIENT)
Dept: CT IMAGING | Facility: HOSPITAL | Age: 62
Discharge: HOME | End: 2022-11-14
Attending: INTERNAL MEDICINE

## 2022-11-14 DIAGNOSIS — R91.8 GROUND GLASS OPACITY PRESENT ON IMAGING OF LUNG: ICD-10-CM

## 2022-11-18 ENCOUNTER — TELEPHONE (OUTPATIENT)
Dept: PULMONOLOGY | Facility: CLINIC | Age: 62
End: 2022-11-18

## 2022-11-24 DIAGNOSIS — E78.2 MIXED HYPERLIPIDEMIA: ICD-10-CM

## 2022-11-25 RX ORDER — ATORVASTATIN CALCIUM 80 MG/1
TABLET, FILM COATED ORAL
Qty: 90 TABLET | Refills: 1 | Status: SHIPPED | OUTPATIENT
Start: 2022-11-25

## 2022-12-06 ENCOUNTER — TELEPHONE (OUTPATIENT)
Dept: PULMONOLOGY | Facility: HOSPITAL | Age: 62
End: 2022-12-06

## 2022-12-14 ENCOUNTER — OFFICE VISIT (OUTPATIENT)
Dept: PULMONOLOGY | Facility: CLINIC | Age: 62
End: 2022-12-14

## 2022-12-14 VITALS
TEMPERATURE: 97.2 F | SYSTOLIC BLOOD PRESSURE: 152 MMHG | OXYGEN SATURATION: 98 % | WEIGHT: 211 LBS | HEART RATE: 88 BPM | BODY MASS INDEX: 27.08 KG/M2 | DIASTOLIC BLOOD PRESSURE: 82 MMHG | HEIGHT: 74 IN

## 2022-12-14 DIAGNOSIS — R91.1 LUNG NODULE: Primary | ICD-10-CM

## 2022-12-14 DIAGNOSIS — R49.0 HOARSENESS OF VOICE: ICD-10-CM

## 2022-12-14 DIAGNOSIS — R91.8 GROUND GLASS OPACITY PRESENT ON IMAGING OF LUNG: ICD-10-CM
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
## 2022-12-23 DIAGNOSIS — R22.0 FACIAL SWELLING: Primary | ICD-10-CM

## 2022-12-23 RX ORDER — PREDNISONE 10 MG/1
TABLET ORAL
Qty: 15 TABLET | Refills: 0 | Status: SHIPPED | OUTPATIENT
Start: 2022-12-23 | End: 2022-12-28

## 2022-12-30 ENCOUNTER — RA CDI HCC (OUTPATIENT)
Dept: OTHER | Facility: HOSPITAL | Age: 62
End: 2022-12-30

## 2022-12-30 NOTE — PROGRESS NOTES
[facility]  coding opportunities       Chart reviewed, no opportunity found:   [address]        Patients Insurance     Medicare Insurance: [facility]

## 2023-01-09 ENCOUNTER — OFFICE VISIT (OUTPATIENT)
Dept: FAMILY MEDICINE CLINIC | Facility: CLINIC | Age: 63
End: 2023-01-09

## 2023-01-09 VITALS
HEIGHT: 74 IN | WEIGHT: 213.13 LBS | SYSTOLIC BLOOD PRESSURE: 140 MMHG | BODY MASS INDEX: 27.35 KG/M2 | TEMPERATURE: 97.6 F | DIASTOLIC BLOOD PRESSURE: 80 MMHG

## 2023-01-09 DIAGNOSIS — N18.31 CHRONIC KIDNEY DISEASE, STAGE 3A (HCC): ICD-10-CM

## 2023-01-09 DIAGNOSIS — I10 ESSENTIAL HYPERTENSION: ICD-10-CM

## 2023-01-09 DIAGNOSIS — D12.4 ADENOMATOUS POLYP OF DESCENDING COLON: ICD-10-CM

## 2023-01-09 DIAGNOSIS — E66.3 OVERWEIGHT: ICD-10-CM

## 2023-01-09 DIAGNOSIS — K74.00 HEPATIC FIBROSIS: ICD-10-CM

## 2023-01-09 DIAGNOSIS — E03.9 ACQUIRED HYPOTHYROIDISM: Primary | ICD-10-CM

## 2023-01-09 DIAGNOSIS — L20.9 ATOPIC DERMATITIS, UNSPECIFIED TYPE: ICD-10-CM

## 2023-01-09 DIAGNOSIS — E78.2 MIXED HYPERLIPIDEMIA: ICD-10-CM

## 2023-01-09 DIAGNOSIS — Z12.5 SCREENING FOR PROSTATE CANCER: ICD-10-CM

## 2023-01-09 RX ORDER — AMMONIUM LACTATE 12 G/100G
LOTION TOPICAL 2 TIMES DAILY PRN
Qty: 500 G | Refills: 1 | Status: SHIPPED | OUTPATIENT
Start: 2023-01-09

## 2023-01-09 NOTE — ASSESSMENT & PLAN NOTE
Lab Results   Component Value Date    EGFR 72 10/05/2022    EGFR 67 04/04/2022    EGFR 55 11/02/2021    CREATININE 1 08 10/05/2022    CREATININE 1 16 04/04/2022    CREATININE 1 37 (H) 11/02/2021   stage 3 renal disease is stable avoid NSAIDS and stay hydrated Control of blood pressure was stressed I will see him in 6 months
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
## 2023-01-09 NOTE — PATIENT INSTRUCTIONS
Medicare Preventive Visit Patient Instructions  Thank you for completing your Welcome to Medicare Visit or Medicare Annual Wellness Visit today  Your next wellness visit will be due in one year (1/10/2024)  The screening/preventive services that you may require over the next 5-10 years are detailed below  Some tests may not apply to you based off risk factors and/or age  Screening tests ordered at today's visit but not completed yet may show as past due  Also, please note that scanned in results may not display below  Preventive Screenings:  Service Recommendations Previous Testing/Comments   Colorectal Cancer Screening  · Colonoscopy    · Fecal Occult Blood Test (FOBT)/Fecal Immunochemical Test (FIT)  · Fecal DNA/Cologuard Test  · Flexible Sigmoidoscopy Age: 39-70 years old   Colonoscopy: every 10 years (May be performed more frequently if at higher risk)  OR  FOBT/FIT: every 1 year  OR  Cologuard: every 3 years  OR  Sigmoidoscopy: every 5 years  Screening may be recommended earlier than age 39 if at higher risk for colorectal cancer  Also, an individualized decision between you and your healthcare provider will decide whether screening between the ages of 74-80 would be appropriate   Colonoscopy: 09/16/2021  FOBT/FIT: Not on file  Cologuard: Not on file  Sigmoidoscopy: Not on file    Screening Current     Prostate Cancer Screening Individualized decision between patient and health care provider in men between ages of 53-78   Medicare will cover every 12 months beginning on the day after your 50th birthday PSA: 0 5 ng/mL     Screening Current     Hepatitis C Screening Once for adults born between 1945 and 1965  More frequently in patients at high risk for Hepatitis C Hep C Antibody: 02/25/2019    Screening Current   Diabetes Screening 1-2 times per year if you're at risk for diabetes or have pre-diabetes Fasting glucose: 93 mg/dL (10/5/2022)  A1C: No results in last 5 years (No results in last 5 years)  Screening Current   Cholesterol Screening Once every 5 years if you don't have a lipid disorder  May order more often based on risk factors  Lipid panel: 10/05/2022  Screening Not Indicated  History Lipid Disorder      Other Preventive Screenings Covered by Medicare:  1  Abdominal Aortic Aneurysm (AAA) Screening: covered once if your at risk  You're considered to be at risk if you have a family history of AAA or a male between the age of 73-68 who smoking at least 100 cigarettes in your lifetime  2  Lung Cancer Screening: covers low dose CT scan once per year if you meet all of the following conditions: (1) Age 50-69; (2) No signs or symptoms of lung cancer; (3) Current smoker or have quit smoking within the last 15 years; (4) You have a tobacco smoking history of at least 20 pack years (packs per day x number of years you smoked); (5) You get a written order from a healthcare provider  3  Glaucoma Screening: covered annually if you're considered high risk: (1) You have diabetes OR (2) Family history of glaucoma OR (3)  aged 48 and older OR (3)  American aged 72 and older  3  Osteoporosis Screening: covered every 2 years if you meet one of the following conditions: (1) Have a vertebral abnormality; (2) On glucocorticoid therapy for more than 3 months; (3) Have primary hyperparathyroidism; (4) On osteoporosis medications and need to assess response to drug therapy  5  HIV Screening: covered annually if you're between the age of 12-76  Also covered annually if you are younger than 13 and older than 72 with risk factors for HIV infection  For pregnant patients, it is covered up to 3 times per pregnancy      Immunizations:  Immunization Recommendations   Influenza Vaccine Annual influenza vaccination during flu season is recommended for all persons aged >= 6 months who do not have contraindications   Pneumococcal Vaccine   * Pneumococcal conjugate vaccine = PCV13 (Prevnar 13), PCV15 (Vaxneuvance), PCV20 (Prevnar 20)  * Pneumococcal polysaccharide vaccine = PPSV23 (Pneumovax) Adults 2364 years old: 1-3 doses may be recommended based on certain risk factors  Adults 72 years old: 1-2 doses may be recommended based off what pneumonia vaccine you previously received   Hepatitis B Vaccine 3 dose series if at intermediate or high risk (ex: diabetes, end stage renal disease, liver disease)   Tetanus (Td) Vaccine - COST NOT COVERED BY MEDICARE PART B Following completion of primary series, a booster dose should be given every 10 years to maintain immunity against tetanus  Td may also be given as tetanus wound prophylaxis  Tdap Vaccine - COST NOT COVERED BY MEDICARE PART B Recommended at least once for all adults  For pregnant patients, recommended with each pregnancy  Shingles Vaccine (Shingrix) - COST NOT COVERED BY MEDICARE PART B  2 shot series recommended in those aged 48 and above     Health Maintenance Due:      Topic Date Due   • Lung Cancer Screening  11/14/2023   • Colorectal Cancer Screening  09/15/2024   • HIV Screening  Completed   • Hepatitis C Screening  Completed     Immunizations Due:      Topic Date Due   • Meningococcal ACWY Vaccine (1 - Risk start 2-23 months series) Never done   • Hepatitis A Vaccine (1 of 2 - Risk 2-dose series) Never done   • HIB Vaccine (1 of 1 - Risk 1-dose series) Never done   • Hepatitis B Vaccine (1 of 3 - Risk 3-dose series) Never done   • COVID-19 Vaccine (4 - Booster for Moderna series) 12/27/2021   • Influenza Vaccine (1) 09/01/2022     Advance Directives   What are advance directives? Advance directives are legal documents that state your wishes and plans for medical care  These plans are made ahead of time in case you lose your ability to make decisions for yourself  Advance directives can apply to any medical decision, such as the treatments you want, and if you want to donate organs  What are the types of advance directives?   There are many types of advance directives, and each state has rules about how to use them  You may choose a combination of any of the following:  · Living will: This is a written record of the treatment you want  You can also choose which treatments you do not want, which to limit, and which to stop at a certain time  This includes surgery, medicine, IV fluid, and tube feedings  · Durable power of  for healthcare [place] [facility]): This is a written record that states who you want to make healthcare choices for you when you are unable to make them for yourself  This person, called a proxy, is usually a family member or a friend  You may choose more than 1 proxy  · Do not resuscitate (DNR) order:  A DNR order is used in case your heart stops beating or you stop breathing  It is a request not to have certain forms of treatment, such as CPR  A DNR order may be included in other types of advance directives  · Medical directive: This covers the care that you want if you are in a coma, near death, or unable to make decisions for yourself  You can list the treatments you want for each condition  Treatment may include pain medicine, surgery, blood transfusions, dialysis, IV or tube feedings, and a ventilator (breathing machine)  · Values history: This document has questions about your views, beliefs, and how you feel and think about life  This information can help others choose the care that you would choose  Why are advance directives important? An advance directive helps you control your care  Although spoken wishes may be used, it is better to have your wishes written down  Spoken wishes can be misunderstood, or not followed  Treatments may be given even if you do not want them  An advance directive may make it easier for your family to make difficult choices about your care     Weight Management   Why it is important to manage your weight:  Being overweight increases your risk of health conditions such as heart disease, high blood pressure, type 2 diabetes, and certain types of cancer  It can also increase your risk for osteoarthritis, sleep apnea, and other respiratory problems  Aim for a slow, steady weight loss  Even a small amount of weight loss can lower your risk of health problems  How to lose weight safely:  A safe and healthy way to lose weight is to eat fewer calories and get regular exercise  You can lose up about 1 pound a week by decreasing the number of calories you eat by 500 calories each day  Healthy meal plan for weight management:  A healthy meal plan includes a variety of foods, contains fewer calories, and helps you stay healthy  A healthy meal plan includes the following:  · Eat whole-grain foods more often  A healthy meal plan should contain fiber  Fiber is the part of grains, fruits, and vegetables that is not broken down by your body  Whole-grain foods are healthy and provide extra fiber in your diet  Some examples of whole-grain foods are whole-wheat breads and pastas, oatmeal, brown rice, and bulgur  · Eat a variety of vegetables every day  Include dark, leafy greens such as spinach, kale, michael greens, and mustard greens  Eat yellow and orange vegetables such as carrots, sweet potatoes, and winter squash  · Eat a variety of fruits every day  Choose fresh or canned fruit (canned in its own juice or light syrup) instead of juice  Fruit juice has very little or no fiber  · Eat low-fat dairy foods  Drink fat-free (skim) milk or 1% milk  Eat fat-free yogurt and low-fat cottage cheese  Try low-fat cheeses such as mozzarella and other reduced-fat cheeses  · Choose meat and other protein foods that are low in fat  Choose beans or other legumes such as split peas or lentils  Choose fish, skinless poultry (chicken or turkey), or lean cuts of red meat (beef or pork)  Before you cook meat or poultry, cut off any visible fat  · Use less fat and oil  Try baking foods instead of frying them   Add less fat, such as margarine, sour cream, regular salad dressing and mayonnaise to foods  Eat fewer high-fat foods  Some examples of high-fat foods include french fries, doughnuts, ice cream, and cakes  · Eat fewer sweets  Limit foods and drinks that are high in sugar  This includes candy, cookies, regular soda, and sweetened drinks  Exercise:  Exercise at least 30 minutes per day on most days of the week  Some examples of exercise include walking, biking, dancing, and swimming  You can also fit in more physical activity by taking the stairs instead of the elevator or parking farther away from stores  Ask your healthcare provider about the best exercise plan for you  © Copyright [employer] 2018 Information is for End User's use only and may not be sold, redistributed or otherwise used for commercial purposes   All illustrations and images included in CareNotes® are the copyrighted property of A D A M , Inc  or [address] [place] [address]

## 2023-01-10 ENCOUNTER — EVALUATION (OUTPATIENT)
Dept: SPEECH THERAPY | Facility: REHABILITATION | Age: 63
End: 2023-01-10

## 2023-01-10 DIAGNOSIS — R49.0 HOARSENESS: ICD-10-CM

## 2023-01-10 DIAGNOSIS — R49.8 OTHER VOICE AND RESONANCE DISORDERS: Primary | ICD-10-CM

## 2023-01-10 DIAGNOSIS — R49.0 MUSCLE TENSION DYSPHONIA: ICD-10-CM

## 2023-01-10 NOTE — PROGRESS NOTES
Speech-Language Pathology Initial Evaluation    Today's date: 1/10/2023  Patient’s name: Arjun Buchanan  : 1960  MRN: [id number]  Safety measures: Medication allergy  Referring provider: Zack Dang MD    Encounter Diagnosis     ICD-10-CM    1  Other voice and resonance disorders  R49 8       2  Muscle tension dysphonia  R49 0 Ambulatory referral to Speech Therapy      3  Hoarseness  R49 0 Ambulatory referral to Speech Therapy             Visit tracking:  -Referring provider: Epic  -Billing guidelines: CMS  -Visit #1  -Insurance: [employer] (no auth required)  -RE due  3/7/2023    Subjective comments:  Since Nov has been noting raspy voice, improved with steroid course per patient however has remained, patient reports he has always had a raspy voice to some degree    How did the patient hear about us? Physician    Patient's goal(s):  "talk clearer"    Reason for referral: Change in vocal quality  Prior functional status: Communication effective and appropriate in all situations  Clinically complex situations: N/A    History: Patient is a 60 y o  male who was referred to outpatient skilled Speech Therapy services for a aphasia and voice evaluation  Pt with medical history significant for LPRD, MTD, vocal fold nodules resulting in other voice/resonance disorders at this time  Pt was previously able to communicate 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, other jobs, now retired    Mental status: Alert  Behavior status: Cooperative  Communication modalities: Verbal  Rehabilitation prognosis: Good rehab potential to reach the established goals    Assessments    VOICE EVALUATION:  -Chief complaint: hoarse VQ     -Onset: Gradual (beginning: approx  NOV)    -Voice history: Head/neck injury, Hearing loss and Reflux     -Occupational/vocal demands: communication with family/friends    -Water intake: Half gallon per day "I drink a lot of water"    -Caffeine intake: 1 - 8oz cup per day, 1 cup of caff tea    -Alcohol intake: 6 pack or two per week    -Smoking?: smoked for 42 years, quit 7 years ago    -Throat clearing/coughing?: not noted this session        1  Voice Handicap Index (VHI): The VHI is a list of 30 statements that many people have used to describe their voices and the effects of their voices on their lives  Patient indicated how frequently she has the same experience using a rating point scale (“never” = 0, “almost never” = 1, “sometimes” = 2, “almost always” = 3, and “always” = 4)  Patient's results were as follows:    Subscale: Score: Self-Perceived Impairment Level:   Physical 14/40 Mild   Functional 10/40 Mild   Emotional 1/40 Mild        TOTAL 25/120 Mild       PERCEPTUAL VOICE ASSESSMENT:    2  Consensus Auditory Perceptual Evaluation of Voice (CAPE-V): The CAPE-V rates auditory-perceptual qualities of a patient’s voice  The overall severity, roughness, breathiness, strain, pitch and loudness are rated during several tasks including general conversation, vowel prolongation, and reading of sentences  Patient also read the [place] to assess the coordination of respiration and voice production  The ratings of the abovementioned parameters were plotted on a 100-millimeter scale, with “0” corresponding to typical normal voice and “100” indicating a deviant voice  DNT due to time limitations    RESPIRATORY EFFICIENCY:   3  S:Z Ratio Task: Patient was instructed to sustain the sounds /s/ and /z/ across three trials to examine the coordination and efficiency of respiration and voice production  Normative data suggests that adults can prolong these sounds for 20-25 seconds  Ratios of 1 4 and above are consistent with laryngeal inefficiency, and ratios of 2 0 and above are suggestive of vocal fold pathology  Task: Trial 1 (sec): Trial 2 (sec): Trial 3 (sec):   /s/ 9 78 10 60 9 69   /z/ 5 74 7 15 6 76     Best /s/: 10 60  Best /z/: 7 15      Ratio: 1 48      4  Maximum Phonation Time: Patient was instructed to sustain the sound /ah/ across three trials to measure respiratory and laryngeal coordination and efficiency  Adults are typically able to prolong these vowels sound for 15-20 seconds  Reduced MPT may suggest poor respiratory support, or poor medial glottal closure  Trial 1 (sec): Trial 2 (sec): Trial 3 (sec):   3 51 6 64 8 21     Average Duration: 6 12 seconds        Goals    Short-term Goals:    1  Vocal quality during 1:1 conversation will improve with use of trained compensatory strategies with 80% accuracy, to be achieved in 6-8 weeks  2  Patient will use and implement Resonant Voice during 80% of spontaneous speech as judged by patient and clinician, to be achieved in 6-8 weeks  3  Patient will demonstrate the use of trained relaxation exercises with 80% accuracy independently, to decrease upper body tension contributing to dysphonia, to be achieved in 6-8 weeks  Long-term Goals:    1  Patient will improve vocal quality by 80% as self-reported by patient for increased functional communication by discharge  2  Patient will independently utilize trained compensatory strategies with 90% accuracy independently at a conversational level allowing for improved vocal quality for communication by discharge  Impressions/Recommendations    Impressions:   Patient presents with moderate to severe voice difficulties at this time, characterized by hoarse VQ impacting communication accuracy at this time   Patient reports overall severity rating of mild on VHI this date, noted 6 12s MPT (below avg), hoarse vocal quality  Patient would benefit from skilled SLP interventions at this time in order to facilitate improved vocal quality and to train in strategies in order to facilitate improved vocal quality allowing for improved communication accuracy and improved vocal quality within environment  Recommendations:  -Patient would benefit from outpatient skilled Speech Therapy services: Voice therapy    -Frequency: 1x weekly  -Duration: 6-8 weeks    -Intervention certification from: 5/53/9090  -Intervention certification to:  3/4/2576    Voice therapy completed this date:    Patient trained in vocal hygiene recommendations (increased water intake and reduced caffeine intake) this date, in order to facilitate improved vocal health for improved overall vocal quality during conversational tasks  Patient verbalized understanding  Patient trained in SOVTEs/cup bubbling with and without phonation this date provided mild level cues in order to facilitate improved vocal function for improved vocal quality during conversational tasks  Patient reports slight improvements noted s/p completion  Patient trained in use of resonant voice during word level tasks provided moderate to max levels for improved accuracy, allowing for improved vocal function

## 2023-01-16 ENCOUNTER — OFFICE VISIT (OUTPATIENT)
Dept: SPEECH THERAPY | Facility: REHABILITATION | Age: 63
End: 2023-01-16

## 2023-01-16 DIAGNOSIS — R49.0 MUSCLE TENSION DYSPHONIA: ICD-10-CM

## 2023-01-16 DIAGNOSIS — R49.0 HOARSENESS: ICD-10-CM

## 2023-01-16 DIAGNOSIS — R49.8 OTHER VOICE AND RESONANCE DISORDERS: Primary | ICD-10-CM

## 2023-01-16 NOTE — PROGRESS NOTES
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       3  Hoarseness  R49 0           Visit tracking:  -Referring provider: Epic  -Billing guidelines: CMS  -Visit #2  -Insurance: [facility] (no auth required)  -RE due  3/7/2023    Subjective/Behavioral:  -"I feel like my voice is improving"    Objective/Assessment:  -Patient education on plan of therapy, patient verbalized understanding  Short-term goals:      1  Vocal quality during 1:1 conversation will improve with use of trained compensatory strategies with 80% accuracy, to be achieved in 6-8 weeks  Patient trained in use of diaphragmatic breathing/lower abdominal breathing vs  Clavicular/thoracic pattern breathing  in order to facilitate improved breath support for speech tasks  Patient verbalized understanding, demonstrated understanding provided moderate cues  Patient required mod cues for use during sentence/conversational tasks  Improved vocal quality noted with use of diaphragmatic breathing  2  Patient will use and implement Resonant Voice during 80% of spontaneous speech as judged by patient and clinician, to be achieved in 6-8 weeks  Patient trained in flow voice techniques this date  Facilitation of "flow" voice at a Word, phrase and conversational level this date, patient required moderate verbal cues this date for improved accuracy for use of "flow voice", allowing for improved VQ  Patient with improved self monitoring for resonant voice when compared to previous session, however would benefit from continued training         3  Patient will demonstrate the use of trained relaxation exercises with 80% accuracy independently, to decrease upper body tension contributing to dysphonia, to be achieved in 6-8 weeks  Patient trained in [address] without phonation this date provided in order to facilitate improved vocal function for improved vocal quality during conversational tasks  Patient completed 2 sets of 3 this date  Plan:  -Patient will be placed on a 30-day hold  Patient at this time reports his voice is improving and does not feel he needs to return for therapeutic interventions, SLP discussed with patient to be placed on 30 day hold, pending any changes, SLP will plan to discharge if does not hear from patient with concerns prior to that time

## 2023-01-20 DIAGNOSIS — I10 ESSENTIAL HYPERTENSION: ICD-10-CM

## 2023-01-20 RX ORDER — AMLODIPINE BESYLATE 10 MG/1
TABLET ORAL
Qty: 90 TABLET | Refills: 1 | Status: SHIPPED | OUTPATIENT
Start: 2023-01-20

## 2023-05-02 DIAGNOSIS — E03.9 ACQUIRED HYPOTHYROIDISM: ICD-10-CM

## 2023-05-02 RX ORDER — LEVOTHYROXINE SODIUM 0.07 MG/1
TABLET ORAL
Qty: 90 TABLET | Refills: 1 | OUTPATIENT
Start: 2023-05-02

## 2023-05-25 DIAGNOSIS — E78.2 MIXED HYPERLIPIDEMIA: ICD-10-CM

## 2023-05-25 RX ORDER — ATORVASTATIN CALCIUM 80 MG/1
TABLET, FILM COATED ORAL
Qty: 90 TABLET | Refills: 1 | OUTPATIENT
Start: 2023-05-25

## 2023-06-02 DIAGNOSIS — E78.2 MIXED HYPERLIPIDEMIA: ICD-10-CM

## 2023-06-02 RX ORDER — ATORVASTATIN CALCIUM 80 MG/1
TABLET, FILM COATED ORAL
Qty: 90 TABLET | Refills: 1 | OUTPATIENT
Start: 2023-06-02

## 2023-06-16 DIAGNOSIS — E78.2 MIXED HYPERLIPIDEMIA: ICD-10-CM

## 2023-06-16 RX ORDER — ATORVASTATIN CALCIUM 80 MG/1
TABLET, FILM COATED ORAL
Qty: 90 TABLET | Refills: 1 | OUTPATIENT
Start: 2023-06-16

## 2023-07-02 DIAGNOSIS — E78.2 MIXED HYPERLIPIDEMIA: ICD-10-CM

## 2023-07-02 RX ORDER — ATORVASTATIN CALCIUM 80 MG/1
TABLET, FILM COATED ORAL
Qty: 90 TABLET | Refills: 1 | OUTPATIENT
Start: 2023-07-02

## 2024-04-09 NOTE — MISCELLANEOUS
Dear Darrin Barrera,    Per your request, this message contains your appointment information  You have a scheduled follow up appointment with Dr Cruz Jimenez 2/8/2018 at 10:00am  This appointment will be held in our [place]  office      Thank you and have a great day,  [facility]       Electronically signed by:Jeanna Thompson   Dec 21 2017 11:22AM EST
Pt AA+Ox1. Pt awake and alert, was confused and disoriented when asked simple questions. Pt was mumbling non-sequitor phrases (i.e., "I feel a puppy"). Pt self-extubated this AM, currently receiving O2 through nasal cannula.

## (undated) DEVICE — SINGLE-USE BIOPSY FORCEPS: Brand: RADIAL JAW 4